# Patient Record
Sex: FEMALE | Employment: UNEMPLOYED | URBAN - METROPOLITAN AREA
[De-identification: names, ages, dates, MRNs, and addresses within clinical notes are randomized per-mention and may not be internally consistent; named-entity substitution may affect disease eponyms.]

---

## 2018-05-21 ENCOUNTER — HOSPITAL ENCOUNTER (OUTPATIENT)
Dept: LAB | Age: 68
Discharge: HOME | End: 2018-05-21
Payer: MEDICAID

## 2018-05-21 ENCOUNTER — OFFICE VISIT (OUTPATIENT)
Dept: FAMILY MEDICINE CLINIC | Age: 68
End: 2018-05-21

## 2018-05-21 VITALS
RESPIRATION RATE: 16 BRPM | TEMPERATURE: 98 F | HEART RATE: 74 BPM | WEIGHT: 160 LBS | BODY MASS INDEX: 28.35 KG/M2 | OXYGEN SATURATION: 98 % | HEIGHT: 63 IN | DIASTOLIC BLOOD PRESSURE: 70 MMHG | SYSTOLIC BLOOD PRESSURE: 119 MMHG

## 2018-05-21 DIAGNOSIS — E11.8 TYPE 2 DIABETES MELLITUS WITH COMPLICATION, WITHOUT LONG-TERM CURRENT USE OF INSULIN (HCC): ICD-10-CM

## 2018-05-21 DIAGNOSIS — M54.9 UPPER BACK PAIN: ICD-10-CM

## 2018-05-21 DIAGNOSIS — M54.2 CERVICAL PAIN: ICD-10-CM

## 2018-05-21 DIAGNOSIS — I10 ESSENTIAL HYPERTENSION: ICD-10-CM

## 2018-05-21 DIAGNOSIS — M50.30 DDD (DEGENERATIVE DISC DISEASE), CERVICAL: ICD-10-CM

## 2018-05-21 DIAGNOSIS — I10 ESSENTIAL HYPERTENSION: Primary | ICD-10-CM

## 2018-05-21 LAB
ALBUMIN SERPL-MCNC: 4 G/DL (ref 3.4–5)
ALBUMIN/GLOB SERPL: 1.2 {RATIO} (ref 0.8–1.7)
ALP SERPL-CCNC: 93 U/L (ref 45–117)
ALT SERPL-CCNC: 16 U/L (ref 13–56)
ANION GAP SERPL CALC-SCNC: 11 MMOL/L (ref 3–18)
AST SERPL-CCNC: 12 U/L (ref 15–37)
BASOPHILS # BLD: 0 K/UL (ref 0–0.06)
BASOPHILS NFR BLD: 0 % (ref 0–2)
BILIRUB SERPL-MCNC: 0.2 MG/DL (ref 0.2–1)
BUN SERPL-MCNC: 20 MG/DL (ref 7–18)
BUN/CREAT SERPL: 31 (ref 12–20)
CALCIUM SERPL-MCNC: 9.7 MG/DL (ref 8.5–10.1)
CHLORIDE SERPL-SCNC: 106 MMOL/L (ref 100–108)
CHOLEST SERPL-MCNC: 232 MG/DL
CO2 SERPL-SCNC: 26 MMOL/L (ref 21–32)
CREAT SERPL-MCNC: 0.65 MG/DL (ref 0.6–1.3)
DIFFERENTIAL METHOD BLD: ABNORMAL
EOSINOPHIL # BLD: 0.1 K/UL (ref 0–0.4)
EOSINOPHIL NFR BLD: 1 % (ref 0–5)
ERYTHROCYTE [DISTWIDTH] IN BLOOD BY AUTOMATED COUNT: 14.7 % (ref 11.6–14.5)
GLOBULIN SER CALC-MCNC: 3.3 G/DL (ref 2–4)
GLUCOSE SERPL-MCNC: 167 MG/DL (ref 74–99)
HBA1C MFR BLD: 9.2 % (ref 4.2–5.6)
HCT VFR BLD AUTO: 40.9 % (ref 35–45)
HDLC SERPL-MCNC: 62 MG/DL (ref 40–60)
HDLC SERPL: 3.7 {RATIO} (ref 0–5)
HGB BLD-MCNC: 13.2 G/DL (ref 12–16)
LDLC SERPL CALC-MCNC: 150.2 MG/DL (ref 0–100)
LIPID PROFILE,FLP: ABNORMAL
LYMPHOCYTES # BLD: 3.8 K/UL (ref 0.9–3.6)
LYMPHOCYTES NFR BLD: 47 % (ref 21–52)
MCH RBC QN AUTO: 27 PG (ref 24–34)
MCHC RBC AUTO-ENTMCNC: 32.3 G/DL (ref 31–37)
MCV RBC AUTO: 83.8 FL (ref 74–97)
MONOCYTES # BLD: 0.6 K/UL (ref 0.05–1.2)
MONOCYTES NFR BLD: 8 % (ref 3–10)
NEUTS SEG # BLD: 3.5 K/UL (ref 1.8–8)
NEUTS SEG NFR BLD: 44 % (ref 40–73)
PLATELET # BLD AUTO: 214 K/UL (ref 135–420)
PMV BLD AUTO: 12.3 FL (ref 9.2–11.8)
POTASSIUM SERPL-SCNC: 4.6 MMOL/L (ref 3.5–5.5)
PROT SERPL-MCNC: 7.3 G/DL (ref 6.4–8.2)
RBC # BLD AUTO: 4.88 M/UL (ref 4.2–5.3)
SODIUM SERPL-SCNC: 143 MMOL/L (ref 136–145)
TRIGL SERPL-MCNC: 99 MG/DL (ref ?–150)
VLDLC SERPL CALC-MCNC: 19.8 MG/DL
WBC # BLD AUTO: 8 K/UL (ref 4.6–13.2)

## 2018-05-21 PROCEDURE — 80053 COMPREHEN METABOLIC PANEL: CPT | Performed by: LEGAL MEDICINE

## 2018-05-21 PROCEDURE — 85025 COMPLETE CBC W/AUTO DIFF WBC: CPT | Performed by: LEGAL MEDICINE

## 2018-05-21 PROCEDURE — 83036 HEMOGLOBIN GLYCOSYLATED A1C: CPT | Performed by: LEGAL MEDICINE

## 2018-05-21 PROCEDURE — 80061 LIPID PANEL: CPT | Performed by: LEGAL MEDICINE

## 2018-05-21 PROCEDURE — 36415 COLL VENOUS BLD VENIPUNCTURE: CPT | Performed by: LEGAL MEDICINE

## 2018-05-21 RX ORDER — METFORMIN HYDROCHLORIDE 1000 MG/1
1000 TABLET ORAL 2 TIMES DAILY WITH MEALS
COMMUNITY
End: 2018-08-27 | Stop reason: SDUPTHER

## 2018-05-21 RX ORDER — CYCLOBENZAPRINE HCL 5 MG
5 TABLET ORAL
Qty: 30 TAB | Refills: 1 | Status: SHIPPED | OUTPATIENT
Start: 2018-05-21 | End: 2018-08-27 | Stop reason: SDUPTHER

## 2018-05-21 RX ORDER — GLUCOSAMINE SULFATE 1500 MG
1000 POWDER IN PACKET (EA) ORAL DAILY
COMMUNITY
End: 2019-07-06 | Stop reason: DRUGHIGH

## 2018-05-21 RX ORDER — LISINOPRIL AND HYDROCHLOROTHIAZIDE 12.5; 2 MG/1; MG/1
TABLET ORAL DAILY
COMMUNITY
End: 2018-11-06 | Stop reason: SDUPTHER

## 2018-05-21 RX ORDER — ASPIRIN 81 MG/1
81 TABLET ORAL DAILY
COMMUNITY

## 2018-05-21 RX ORDER — LISINOPRIL AND HYDROCHLOROTHIAZIDE 10; 12.5 MG/1; MG/1
1 TABLET ORAL DAILY
Qty: 90 TAB | Refills: 1 | Status: SHIPPED | OUTPATIENT
Start: 2018-05-21 | End: 2018-11-06 | Stop reason: DRUGHIGH

## 2018-05-21 NOTE — PROGRESS NOTES
Arline Pearce is a 77 y.o. female presents in office to establish care and for high bp. There are no preventive care reminders to display for this patient. 1. Have you been to the ER, urgent care clinic since your last visit? Hospitalized since your last visit?no    2. Have you seen or consulted any other health care providers outside of the [facility] since your last visit? Include any pap smears or colon screening.  no

## 2018-05-22 ENCOUNTER — TELEPHONE (OUTPATIENT)
Dept: FAMILY MEDICINE CLINIC | Age: 68
End: 2018-05-22

## 2018-05-22 RX ORDER — ATORVASTATIN CALCIUM 20 MG/1
20 TABLET, FILM COATED ORAL DAILY
Qty: 30 TAB | Refills: 2 | Status: SHIPPED | OUTPATIENT
Start: 2018-05-22 | End: 2018-08-27 | Stop reason: SDUPTHER

## 2018-05-22 NOTE — TELEPHONE ENCOUNTER
----- Message from Whitney Richter MD sent at 5/22/2018  1:13 PM EDT -----  Elevated hemoglobin A1c 9.2, continue metformin 1000 twice daily will repeat hemoglobin A1c in 2 month. Order will be placed in the file. High cholesterol level will start Lipitor 20 mg daily. Patient need to take aspirin 81 mg daily.   Over-the-counter    Kidney function is normal liver function function is normal, hemoglobin is normal.

## 2018-05-22 NOTE — PROGRESS NOTES
Elevated hemoglobin A1c 9.2, continue metformin 1000 twice daily will repeat hemoglobin A1c in 2 month. Order will be placed in the file. High cholesterol level will start Lipitor 20 mg daily. Patient need to take aspirin 81 mg daily.   Over-the-counter    Kidney function is normal liver function function is normal, hemoglobin is normal.

## 2018-05-22 NOTE — TELEPHONE ENCOUNTER
----- Message from Roseanna Witt MD sent at 5/22/2018  4:40 PM EDT -----  Degeneration of the spine it is called arthritis of the spine, referral to physical therapy was placed

## 2018-05-22 NOTE — PROGRESS NOTES
Masood Gan     Chief Complaint   Patient presents with    Hypertension    Establish Care    Diabetes     Vitals:    05/21/18 1604   BP: 119/70   Pulse: 74   Resp: 16   Temp: 98 °F (36.7 °C)   TempSrc: Oral   SpO2: 98%   Weight: 160 lb (72.6 kg)   Height: 5' 2.68\" (1.592 m)   PainSc:   0 - No pain         HPI: Patient here to establish care, and follow-up for hypertension which is well-controlled and lisinopril and hydrochlorothiazide, she has been diagnosed with hypertension many years ago. She also has type 2 diabetes she is supposed to be on metformin 1000 twice daily but she has been taking it once a day for the last 4 month!!!! And then she had noticed that her blood sugar has been elevated in the 300 and for the last week she has been taking it twice daily. She has neck and upper back pain for many years but she has been having worsening of the pain lately there is no numbness no weakness no radiation of the pain, she has not had recent imaging or not had any physical therapy recently, she take analgesics over-the-counter for pain. Past Medical History:   Diagnosis Date    Diabetes ([address]     Hypercholesteremia     Hypertension      Past Surgical History:   Procedure Laterality Date    HX CHOLECYSTECTOMY       Social History   Substance Use Topics    Smoking status: Never Smoker    Smokeless tobacco: Never Used    Alcohol use No       Family History   Problem Relation Age of Onset    Diabetes Mother     Hypertension Mother        Review of Systems   Constitutional: Negative for chills, fever, malaise/fatigue and weight loss. HENT: Negative for congestion, ear discharge, ear pain, hearing loss, nosebleeds, sinus pain and sore throat. Eyes: Negative for blurred vision, double vision and discharge. Respiratory: Negative for cough, hemoptysis, sputum production and shortness of breath. Cardiovascular: Negative for chest pain, palpitations, claudication and leg swelling. Gastrointestinal: Negative for abdominal pain, constipation, diarrhea, nausea and vomiting. Genitourinary: Negative for dysuria, frequency and urgency. Musculoskeletal: Positive for back pain and neck pain. Negative for joint pain and myalgias. Skin: Negative for itching and rash. Neurological: Negative for dizziness, tingling, sensory change, speech change, focal weakness, weakness and headaches. Psychiatric/Behavioral: Negative for depression, hallucinations, substance abuse and suicidal ideas. Physical Exam   Constitutional: She is oriented to person, place, and time. She appears well-developed and well-nourished. No distress. HENT:   Head: Normocephalic and atraumatic. Mouth/Throat: Oropharynx is clear and moist. No oropharyngeal exudate. Eyes: Conjunctivae are normal. Pupils are equal, round, and reactive to light. Right eye exhibits no discharge. Left eye exhibits no discharge. No scleral icterus. Neck: Normal range of motion. Neck supple. No thyromegaly present. Cardiovascular: Normal rate, regular rhythm and normal heart sounds. No murmur heard. Pulmonary/Chest: Effort normal and breath sounds normal. No respiratory distress. She has no wheezes. She has no rales. She exhibits no tenderness. Abdominal: Soft. Bowel sounds are normal. She exhibits no distension. There is no tenderness. There is no rebound and no guarding. Musculoskeletal: Normal range of motion. She exhibits no edema, tenderness or deformity. Lymphadenopathy:     She has no cervical adenopathy. Neurological: She is alert and oriented to person, place, and time. No cranial nerve deficit. Coordination normal.   Skin: Skin is warm and dry. No rash noted. She is not diaphoretic. No erythema. No pallor. Psychiatric: She has a normal mood and affect. Her behavior is normal. Judgment and thought content normal.        Assessment and plan     1.  Essential hypertension    Blood pressure is well controlled on current medication continue lisinopril with hydrochlorothiazide.    - CBC WITH AUTOMATED DIFF; Future  - METABOLIC PANEL, COMPREHENSIVE; Future  - LIPID PANEL; Future  - HEMOGLOBIN A1C W/O EAG; Future  - MICROALBUMIN, UR, RAND W/ MICROALB/CREAT RATIO; Future  - lisinopril-hydroCHLOROthiazide (PRINZIDE, ZESTORETIC) 10-12.5 mg per tablet; Take 1 Tab by mouth daily. Dispense: 90 Tab; Refill: 1    2. Cervical pain  Official x-ray result is pending, will consider physical therapy referral  - XR SPINE CERV PA LAT ODONT 3 V MAX; Future  - cyclobenzaprine (FLEXERIL) 5 mg tablet; Take 1 Tab by mouth nightly. Dispense: 30 Tab; Refill: 1    3. DDD (degenerative disc disease), cervical    - XR SPINE CERV PA LAT ODONT 3 V MAX; Future  - cyclobenzaprine (FLEXERIL) 5 mg tablet; Take 1 Tab by mouth nightly. Dispense: 30 Tab; Refill: 1    4. Type 2 diabetes mellitus with complication, without long-term current use of insulin (HCC)  ADA diet discussed with patient. We will check hemoglobin A1c. Currently she was only taking metformin once for the last 4 month and now she is being taken twice for the last few days  - CBC WITH AUTOMATED DIFF; Future  - METABOLIC PANEL, COMPREHENSIVE; Future  - LIPID PANEL; Future  - HEMOGLOBIN A1C W/O EAG; Future  - MICROALBUMIN, UR, RAND W/ MICROALB/CREAT RATIO; Future    5. Upper back pain      Current Outpatient Prescriptions   Medication Sig Dispense Refill    metFORMIN (GLUCOPHAGE) 1,000 mg tablet Take 1,000 mg by mouth two (2) times daily (with meals).  lisinopril-hydroCHLOROthiazide (PRINZIDE, ZESTORETIC) 20-12.5 mg per tablet Take  by mouth daily.  VITAMIN B COMPLEX PO Take 1 Tab by mouth daily.  cholecalciferol (VITAMIN D3) 1,000 unit cap Take 1,000 Units by mouth daily.  aspirin delayed-release 81 mg tablet Take 81 mg by mouth daily.  lisinopril-hydroCHLOROthiazide (PRINZIDE, ZESTORETIC) 10-12.5 mg per tablet Take 1 Tab by mouth daily.  90 Tab 1    cyclobenzaprine (FLEXERIL) 5 mg tablet Take 1 Tab by mouth nightly. 30 Tab 1       There are no active problems to display for this patient. No results found for this or any previous visit. Hospital Outpatient Visit on 05/21/2018   Component Date Value Ref Range Status    WBC 05/21/2018 8.0  4.6 - 13.2 K/uL Final    RBC 05/21/2018 4.88  4.20 - 5.30 M/uL Final    HGB 05/21/2018 13.2  12.0 - 16.0 g/dL Final    HCT 05/21/2018 40.9  35.0 - 45.0 % Final    MCV 05/21/2018 83.8  74.0 - 97.0 FL Final    MCH 05/21/2018 27.0  24.0 - 34.0 PG Final    MCHC 05/21/2018 32.3  31.0 - 37.0 g/dL Final    RDW 05/21/2018 14.7* 11.6 - 14.5 % Final    PLATELET 46/05/6054 717  135 - 420 K/uL Final    MPV 05/21/2018 12.3* 9.2 - 11.8 FL Final    NEUTROPHILS 05/21/2018 44  40 - 73 % Final    LYMPHOCYTES 05/21/2018 47  21 - 52 % Final    MONOCYTES 05/21/2018 8  3 - 10 % Final    EOSINOPHILS 05/21/2018 1  0 - 5 % Final    BASOPHILS 05/21/2018 0  0 - 2 % Final    ABS. NEUTROPHILS 05/21/2018 3.5  1.8 - 8.0 K/UL Final    ABS. LYMPHOCYTES 05/21/2018 3.8* 0.9 - 3.6 K/UL Final    ABS. MONOCYTES 05/21/2018 0.6  0.05 - 1.2 K/UL Final    ABS. EOSINOPHILS 05/21/2018 0.1  0.0 - 0.4 K/UL Final    ABS.  BASOPHILS 05/21/2018 0.0  0.0 - 0.06 K/UL Final    DF 05/21/2018 AUTOMATED    Final    Sodium 05/21/2018 143  136 - 145 mmol/L Final    Potassium 05/21/2018 4.6  3.5 - 5.5 mmol/L Final    Chloride 05/21/2018 106  100 - 108 mmol/L Final    CO2 05/21/2018 26  21 - 32 mmol/L Final    Anion gap 05/21/2018 11  3.0 - 18 mmol/L Final    Glucose 05/21/2018 167* 74 - 99 mg/dL Final    BUN 05/21/2018 20* 7.0 - 18 MG/DL Final    Creatinine 05/21/2018 0.65  0.6 - 1.3 MG/DL Final    BUN/Creatinine ratio 05/21/2018 31* 12 - 20   Final    GFR est AA 05/21/2018 >60  >60 ml/min/1.73m2 Final    GFR est non-AA 05/21/2018 >60  >60 ml/min/1.73m2 Final    Comment: (NOTE)  Estimated GFR is calculated using the Modification of Diet in Renal Disease (MDRD) Study equation, reported for both  Americans   (GFRAA) and non- Americans (GFRNA), and normalized to 1.73m2   body surface area. The physician must decide which value applies to   the patient. The MDRD study equation should only be used in   individuals age 25 or older. It has not been validated for the   following: pregnant women, patients with serious comorbid conditions,   or on certain medications, or persons with extremes of body size,   muscle mass, or nutritional status.  Calcium 05/21/2018 9.7  8.5 - 10.1 MG/DL Final    Bilirubin, total 05/21/2018 0.2  0.2 - 1.0 MG/DL Final    ALT (SGPT) 05/21/2018 16  13 - 56 U/L Final    AST (SGOT) 05/21/2018 12* 15 - 37 U/L Final    Alk. phosphatase 05/21/2018 93  45 - 117 U/L Final    Protein, total 05/21/2018 7.3  6.4 - 8.2 g/dL Final    Albumin 05/21/2018 4.0  3.4 - 5.0 g/dL Final    Globulin 05/21/2018 3.3  2.0 - 4.0 g/dL Final    A-G Ratio 05/21/2018 1.2  0.8 - 1.7   Final    LIPID PROFILE 05/21/2018        Final    Cholesterol, total 05/21/2018 232* <200 MG/DL Final    Triglyceride 05/21/2018 99  <150 MG/DL Final    Comment: The drugs N-acetylcysteine (NAC) and  Metamiszole have been found to cause falsely  low results in this chemical assay. Please  be sure to submit blood samples obtained  BEFORE administration of either of these  drugs to assure correct results.  HDL Cholesterol 05/21/2018 62* 40 - 60 MG/DL Final    LDL, calculated 05/21/2018 150.2* 0 - 100 MG/DL Final    VLDL, calculated 05/21/2018 19.8  MG/DL Final    CHOL/HDL Ratio 05/21/2018 3.7  0 - 5.0   Final    Hemoglobin A1c 05/21/2018 9.2* 4.2 - 5.6 % Final    Comment: (NOTE)  HbA1C Interpretive Ranges  <5.7              Normal  5.7 - 6.4         Consider Prediabetes  >6.5              Consider Diabetes            Follow-up Disposition:  Return in about 3 months (around 8/21/2018) for and as per results .

## 2018-05-22 NOTE — TELEPHONE ENCOUNTER
Spoke to pt and made aware of the message, verbalized understanding. Please send the [facility] to [facility].  Thank you  Ronal Porter LPN

## 2018-06-06 ENCOUNTER — DOCUMENTATION ONLY (OUTPATIENT)
Dept: FAMILY MEDICINE CLINIC | Age: 68
End: 2018-06-06

## 2018-06-06 NOTE — PROGRESS NOTES
Medical Records received from office of Maverick Liz. Forwarded to Dr. Adriel Gunn for review and then scanning.

## 2018-06-18 ENCOUNTER — HOSPITAL ENCOUNTER (OUTPATIENT)
Dept: PHYSICAL THERAPY | Age: 68
Discharge: HOME | End: 2018-06-18
Payer: MEDICAID

## 2018-06-18 PROCEDURE — 97140 MANUAL THERAPY 1/> REGIONS: CPT

## 2018-06-18 PROCEDURE — 97162 PT EVAL MOD COMPLEX 30 MIN: CPT

## 2018-06-18 NOTE — PROGRESS NOTES
PHYSICAL THERAPY - DAILY TREATMENT NOTE    Patient Name: Sony Belcher        Date: 2018  : 1950   yes Patient  Verified  Visit #:      of   8  Insurance: Payor: Hank [id number] / Plan: [employer] [facility] / Product Type: Medicaid /      In time: 4:30 Out time: 5:02   Total Treatment Time: 32     Medicare/BCBS Time Tracking (below)   Total Timed Codes (min):  na 1:1 Treatment Time:  na     TREATMENT AREA =  Neck pain [M54.2]    SUBJECTIVE  Pain Level (on 0 to 10 scale):  7  / 10   Medication Changes/New allergies or changes in medical history, any new surgeries or procedures?    no  If yes, update Summary List   Subjective Functional Status/Changes:  []  No changes reported     See POC          OBJECTIVE    NC min Therapeutic Exercise:  [x]  See flow sheet   Rationale:      increase ROM to improve the patients ability to complete ADLs     10 min Manual Therapy: STM to B c/s samia, UT, SOR   Rationale:      decrease pain, increase ROM, increase tissue extensibility and decrease trigger points to improve patient's ability to complete ADLs       min Patient Education:  yes  Reviewed HEP   []  Progressed/Changed HEP based on: Other Objective/Functional Measures:    Pt demo I w/ HEP  Reported dec pain post tx session  Reviewed sitting posture     Post Treatment Pain Level (on 0 to 10) scale:   5  / 10     ASSESSMENT  Assessment/Changes in Function:     See POC     []  See Progress Note/Recertification   Patient will continue to benefit from skilled PT services to modify and progress therapeutic interventions, address functional mobility deficits, address ROM deficits, address strength deficits, analyze and address soft tissue restrictions, analyze and cue movement patterns, analyze and modify body mechanics/ergonomics, assess and modify postural abnormalities and instruct in home and community integration to attain remaining goals.    Progress toward goals / Updated goals:    See POC     PLAN  [] Upgrade activities as tolerated yes Continue plan of care   []  Discharge due to :    []  Other:      Therapist: Temi Cerrato PT    Date: 6/18/2018 Time: 5:02 PM     No future appointments.

## 2018-06-18 NOTE — PROGRESS NOTES
[address] [place] [address], [address] [facility] - Phone: [phone]  Fax: [phone] / [address] [facility]  Patient Name: Michelle Ferreira : 1950   Medical   Diagnosis: C/s DDD [M50.30] Treatment Diagnosis: Neck pain [M54.2]   Onset Date: chronic     Referral Source: Ngoc Pritchett MD Start of Care [facility]): 2018   Prior Hospitalization: See medical history Provider #: [id number]   Prior Level of Function: Hx intermittent c/s pain, RHD   Comorbidities: DM, HTN, OA   Medications: Verified on Patient Summary List   The Plan of Care and following information is based on the information from the initial evaluation.   ===========================================================================================  Assessment / key information:  Pt is a 76 y/o F who presents to PT w/ c/o c/s pain that began approx 6 months ago and has progressively worsened. Pt here today with her daughter who provided  services when needed. 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 Pain ranges from 5-10/10 and is located B [place] and along B c/s into upper t/s. Sx are made worse when looking down; better laying down and with heating pad. Pt denies N/T. Reports x-rays showed DDD. Postural evaluation shows inc FHP w/ slouched sitting posture, IR GHJ, and R elevated UT. C/s AROM limited and painful in flex, L SB, and RR. T/s rot dec 90% B. B shoulder AROM WNL, pain at end-range flex and FIR B. Pt demo gross weakness to B periscapular and shoulder muscles. Sensation symmetrical and intact to light touch B UE dermatomes. (-)c/s compression test but does gain relief w/ c/s distraction test. TTP to R>L c/s samia, UT, lev scap, suboccipital triangle. Dec upper c/s flex and rot noted as well as dec t/s PA mobility.  FOTO score 17/100. Pt educated on dx, prognosis, POC, HEP, and posture. Pt may benefit from PT to address impairments and functional limitations in order to improve pt's activity tolerance.   ===========================================================================================  Eval Complexity: History LOW Complexity : Zero comorbidities / personal factors that will impact the outcome / POC;  Examination  MEDIUM Complexity : 3 Standardized tests and measures addressing body structure, function, activity limitation and / or participation in recreation ; Presentation MEDIUM Complexity : Evolving with changing characteristics ; Decision Making HIGH Complexity : FOTO score of 1- 25 ; Overall Complexity LOW   Problem List: pain affecting function, decrease ROM, decrease strength, decrease ADL/ functional abilitiies, decrease activity tolerance and decrease flexibility/ joint mobility   Treatment Plan may include any combination of the following: Therapeutic exercise, Therapeutic activities, Neuromuscular re-education, Physical agent/modality, Manual therapy, Patient education, Self Care training, Functional mobility training and Home safety training  Patient / Family readiness to learn indicated by: asking questions, trying to perform skills and interest  Persons(s) to be included in education: patient (P)  Barriers to Learning/Limitations: None  Measures taken:    Patient Goal (s): \"relief from the pain\"   Patient self reported health status: fair  Rehabilitation Potential: good   Short Term Goals: To be accomplished in  2  weeks:  1. Pt will be I and compliant with HEP for self management of sx   Long Term Goals: To be accomplished in  4-6  weeks:  1. Pt will report at least 75% overall improvement in sx in order to improve activity tolerance  2. Improve FOTO score to >/= 45/100 to indicate improved function  3.  Pt will demo I w/ long-term HEP for self-management of sx upon DC    Frequency / Duration:   Patient to be seen  1-2  times per week for 4-6  weeks:  Patient / Caregiver education and instruction: exercises, posture  G-Codes (GP): na  Therapist Signature: [facility],  Date: 4/30/4528   Certification Period: na Time: 5:05 PM   ===========================================================================================  I certify that the above Physical Therapy Services are being furnished while the patient is under my care. I agree with the treatment plan and certify that this therapy is necessary. Physician Signature:        Date:       Time:     Please sign and return to [facility], [facility]. or you may fax the signed copy to [phone]. Thank you.

## 2018-08-27 ENCOUNTER — OFFICE VISIT (OUTPATIENT)
Dept: FAMILY MEDICINE CLINIC | Age: 68
End: 2018-08-27

## 2018-08-27 VITALS
OXYGEN SATURATION: 98 % | HEART RATE: 77 BPM | WEIGHT: 161 LBS | TEMPERATURE: 95.8 F | DIASTOLIC BLOOD PRESSURE: 69 MMHG | SYSTOLIC BLOOD PRESSURE: 140 MMHG | HEIGHT: 61 IN | BODY MASS INDEX: 30.4 KG/M2 | RESPIRATION RATE: 16 BRPM

## 2018-08-27 DIAGNOSIS — K58.2 IRRITABLE BOWEL SYNDROME WITH BOTH CONSTIPATION AND DIARRHEA: ICD-10-CM

## 2018-08-27 DIAGNOSIS — M50.30 DDD (DEGENERATIVE DISC DISEASE), CERVICAL: ICD-10-CM

## 2018-08-27 DIAGNOSIS — M17.0 OSTEOARTHRITIS OF BOTH KNEES, UNSPECIFIED OSTEOARTHRITIS TYPE: ICD-10-CM

## 2018-08-27 DIAGNOSIS — E11.8 TYPE 2 DIABETES MELLITUS WITH COMPLICATION, WITHOUT LONG-TERM CURRENT USE OF INSULIN (HCC): ICD-10-CM

## 2018-08-27 DIAGNOSIS — E78.2 MIXED HYPERLIPIDEMIA: ICD-10-CM

## 2018-08-27 DIAGNOSIS — I10 ESSENTIAL HYPERTENSION: ICD-10-CM

## 2018-08-27 DIAGNOSIS — M81.0 OSTEOPOROSIS, UNSPECIFIED OSTEOPOROSIS TYPE, UNSPECIFIED PATHOLOGICAL FRACTURE PRESENCE: ICD-10-CM

## 2018-08-27 DIAGNOSIS — Z01.818 PRE-OP EXAM: Primary | ICD-10-CM

## 2018-08-27 DIAGNOSIS — M54.2 CERVICAL PAIN: ICD-10-CM

## 2018-08-27 LAB
GLUCOSE DOSE-GTT, POCT, GLDSPOCT: 247
HBA1C MFR BLD HPLC: 6.6 %

## 2018-08-27 RX ORDER — ACETAMINOPHEN AND CODEINE PHOSPHATE 300; 30 MG/1; MG/1
1 TABLET ORAL 2 TIMES DAILY
Qty: 30 TAB | Refills: 0 | Status: SHIPPED | OUTPATIENT
Start: 2018-08-27 | End: 2018-11-06 | Stop reason: SDUPTHER

## 2018-08-27 RX ORDER — ATORVASTATIN CALCIUM 20 MG/1
20 TABLET, FILM COATED ORAL DAILY
Qty: 90 TAB | Refills: 1 | Status: SHIPPED | OUTPATIENT
Start: 2018-08-27 | End: 2018-11-06 | Stop reason: SDUPTHER

## 2018-08-27 RX ORDER — METFORMIN HYDROCHLORIDE 1000 MG/1
1000 TABLET ORAL 2 TIMES DAILY WITH MEALS
Qty: 180 TAB | Refills: 1 | Status: SHIPPED | OUTPATIENT
Start: 2018-08-27 | End: 2018-11-06 | Stop reason: SDUPTHER

## 2018-08-27 RX ORDER — CYCLOBENZAPRINE HCL 5 MG
5 TABLET ORAL
Qty: 30 TAB | Refills: 1 | Status: SHIPPED | OUTPATIENT
Start: 2018-08-27 | End: 2018-11-06 | Stop reason: SDUPTHER

## 2018-08-27 NOTE — PROGRESS NOTES
Kadie Pruett is a 77 y.o. female presents in office to be seen for pre-op exam for cataract surgery tomorrow. Health Maintenance Due   Topic Date Due    Hepatitis C Screening  1950    DTaP/Tdap/Td series (1 - Tdap) 01/01/1969    BREAST CANCER SCRN MAMMOGRAM  01/01/1998    FOBT Q 1 YEAR AGE 50-75  01/01/1998    ZOSTER VACCINE AGE 60>  11/01/2007    GLAUCOMA SCREENING Q2Y  01/01/2013    Bone Densitometry (Dexa) Screening  01/01/2013    Pneumococcal 65+ Low/Medium Risk (1 of 2 - PCV13) 01/01/2013    Influenza Age 9 to Adult  08/01/2018       1. Have you been to the ER, urgent care clinic since your last visit? Hospitalized since your last visit?no    2. Have you seen or consulted any other health care providers outside of the [address] [place] [address] since your last visit? Include any pap smears or colon screening.  no

## 2018-08-27 NOTE — PROGRESS NOTES
Sylvia Bo     Chief Complaint   Patient presents with    Neck Pain    Pre-op Exam     Vitals:    08/27/18 1144   BP: 140/69   Pulse: 77   Resp: 16   Temp: 95.8 °F (35.4 °C)   TempSrc: Oral   SpO2: 98%   Weight: 161 lb (73 kg)   Height: 5' 1.5\" (1.562 m)   PainSc:   7   PainLoc: Neck         HPI: Patient is here with her daughter for preop for ophthalmology surgery, she is having cataract surgery tomorrow at the [facility]. Patient also following up on hypertension, blood pressure reading today systolic slightly elevated. Patient have a chronic neck pain due to degenerative disc disease, she still having pain it ranges from 5-8, it increased by movement and bending down, she has been attending physical therapy was minimal help. Patient has type 2 diabetes last hemoglobin A1c was 9.1 about 3 months ago, her metformin was increased from from 1000 to 2000 daily, today her hemoglobin A1c is 6.1. She has a history of osteoporosis but she never had a DEXA scan done will order one today. Patient refused to have a mammogram she understands he can sequence, will discuss at next visit again. Patient also very reluctant to get any stool testing or colonoscopy at this time. Past Medical History:   Diagnosis Date    Diabetes ([address]     Hypercholesteremia     Hypertension      Past Surgical History:   Procedure Laterality Date    HX CHOLECYSTECTOMY       Social History   Substance Use Topics    Smoking status: Never Smoker    Smokeless tobacco: Never Used    Alcohol use No       Family History   Problem Relation Age of Onset    Diabetes Mother     Hypertension Mother        Review of Systems   Constitutional: Negative for chills, fever, malaise/fatigue and weight loss. HENT: Negative for congestion, ear discharge, ear pain, hearing loss, nosebleeds, sinus pain and sore throat. Eyes: Negative for blurred vision, double vision and discharge.    Respiratory: Negative for cough, hemoptysis, sputum production and shortness of breath. Cardiovascular: Negative for chest pain, palpitations, orthopnea, claudication and leg swelling. Gastrointestinal: Positive for abdominal pain and constipation. Negative for blood in stool, diarrhea, melena, nausea and vomiting. Genitourinary: Negative for dysuria, frequency, hematuria and urgency. Musculoskeletal: Positive for back pain, joint pain and neck pain. Negative for myalgias. Skin: Negative for itching and rash. Neurological: Negative for dizziness, tingling, sensory change, speech change, focal weakness, seizures, weakness and headaches. Psychiatric/Behavioral: Negative for depression, hallucinations, substance abuse and suicidal ideas. The patient is not nervous/anxious and does not have insomnia. Physical Exam   Constitutional: She is oriented to person, place, and time. She appears well-developed and well-nourished. No distress. HENT:   Head: Normocephalic and atraumatic. Mouth/Throat: Oropharynx is clear and moist. No oropharyngeal exudate. Eyes: Conjunctivae are normal. Pupils are equal, round, and reactive to light. Right eye exhibits no discharge. Left eye exhibits no discharge. No scleral icterus. Neck: Normal range of motion. Neck supple. No thyromegaly present. Cardiovascular: Normal rate, regular rhythm and normal heart sounds. No murmur heard. Pulmonary/Chest: Effort normal and breath sounds normal. No respiratory distress. She has no wheezes. She has no rales. She exhibits no tenderness. Abdominal: Soft. Bowel sounds are normal. She exhibits no distension. There is no tenderness. There is no rebound and no guarding. Musculoskeletal: Normal range of motion. She exhibits tenderness. She exhibits no edema or deformity. Neck and upper back tenderness   Lymphadenopathy:     She has no cervical adenopathy. Neurological: She is alert and oriented to person, place, and time. No cranial nerve deficit. Coordination normal.   Skin: Skin is warm and dry. No rash noted. She is not diaphoretic. No erythema. No pallor. Psychiatric: She has a normal mood and affect. Her behavior is normal. Judgment and thought content normal.        Assessment and plan     1. Cervical pain    - cyclobenzaprine (FLEXERIL) 5 mg tablet; Take 1 Tab by mouth nightly. Dispense: 30 Tab; Refill: 1  - acetaminophen-codeine (TYLENOL #3) 300-30 mg per tablet; Take 1 Tab by mouth two (2) times a day. Max Daily Amount: 2 Tabs. Dispense: 30 Tab; Refill: 0    2. DDD (degenerative disc disease), cervical  Patient is having daily pain she will be referred to pain management for nonnarcotic management. She is still doing physical therapy    - cyclobenzaprine (FLEXERIL) 5 mg tablet; Take 1 Tab by mouth nightly. Dispense: 30 Tab; Refill: 1  - REFERRAL TO PAIN MANAGEMENT  - acetaminophen-codeine (TYLENOL #3) 300-30 mg per tablet; Take 1 Tab by mouth two (2) times a day. Max Daily Amount: 2 Tabs. Dispense: 30 Tab; Refill: 0    3. Essential hypertension  Well-controlled    4. Mixed hyperlipidemia  Patient is on statin  - atorvastatin (LIPITOR) 20 mg tablet; Take 1 Tab by mouth daily. Dispense: 90 Tab; Refill: 1    5. Type 2 diabetes mellitus with complication, without long-term current use of insulin ([facility])  Hemoglobin A1c is 6.1 is much improvement from 3 months ago was 9 point  - metFORMIN (GLUCOPHAGE) 1,000 mg tablet; Take 1 Tab by mouth two (2) times daily (with meals). Dispense: 180 Tab; Refill: 1  - AMB POC GLUCOSE TEST  - AMB POC HEMOGLOBIN A1C    6. Pre-op exam  Patient is clear for eye surgery  - AMB POC EKG ROUTINE W/ 12 LEADS, INTER & REP    7. Osteoporosis, unspecified osteoporosis type, unspecified pathological fracture presence  History of osteoporosis will get a bone density scan for evaluation  - DEXA BONE DENSITY STUDY AXIAL; Future    8.  Irritable bowel syndrome with both constipation and diarrhea  Patient advised about high fiber diet increase water intake, daily walk, and keep a note of report that  aggravates her symptoms. I started her on linzess daily     Current Outpatient Prescriptions   Medication Sig Dispense Refill    atorvastatin (LIPITOR) 20 mg tablet Take 1 Tab by mouth daily. 90 Tab 1    metFORMIN (GLUCOPHAGE) 1,000 mg tablet Take 1 Tab by mouth two (2) times daily (with meals). 180 Tab 1    cyclobenzaprine (FLEXERIL) 5 mg tablet Take 1 Tab by mouth nightly. 30 Tab 1    acetaminophen-codeine (TYLENOL #3) 300-30 mg per tablet Take 1 Tab by mouth two (2) times a day. Max Daily Amount: 2 Tabs. 30 Tab 0    linaclotide (LINZESS) 145 mcg cap capsule Take 1 Cap by mouth Daily (before breakfast). 30 Cap 2    lisinopril-hydroCHLOROthiazide (PRINZIDE, ZESTORETIC) 20-12.5 mg per tablet Take  by mouth daily.  cholecalciferol (VITAMIN D3) 1,000 unit cap Take 1,000 Units by mouth daily.  aspirin delayed-release 81 mg tablet Take 81 mg by mouth daily.  VITAMIN B COMPLEX PO Take 1 Tab by mouth daily.  lisinopril-hydroCHLOROthiazide (PRINZIDE, ZESTORETIC) 10-12.5 mg per tablet Take 1 Tab by mouth daily. 90 Tab 1       Patient Active Problem List    Diagnosis Date Noted    DDD (degenerative disc disease), cervical 08/27/2018    Essential hypertension 08/27/2018    Mixed hyperlipidemia 08/27/2018    Osteoporosis 08/27/2018     Results for orders placed or performed in visit on 08/27/18   AMB POC GLUCOSE TEST   Result Value Ref Range    Glucose dose-    AMB POC HEMOGLOBIN A1C   Result Value Ref Range    Hemoglobin A1c (POC) 6.6 %     Office Visit on 08/27/2018   Component Date Value Ref Range Status    Glucose dose-GTT 08/27/2018 247   Final    Hemoglobin A1c (POC) 08/27/2018 6.6  % Final          Follow-up Disposition:  Return in about 2 months (around 10/27/2018).

## 2018-09-12 ENCOUNTER — TELEPHONE (OUTPATIENT)
Dept: FAMILY MEDICINE CLINIC | Age: 68
End: 2018-09-12

## 2018-09-14 ENCOUNTER — TELEPHONE (OUTPATIENT)
Dept: FAMILY MEDICINE CLINIC | Age: 68
End: 2018-09-14

## 2018-09-24 NOTE — PROGRESS NOTES
[address] PHYSICAL THERAPY  [address] [place] [address] 201,[facility], [facility] - Phone: [phone]  Fax: [phone]    DISCHARGE NOTE  Patient Name: Nayely Giron : 1950   Treatment/Medical Diagnosis: Neck pain [M54.2]   Referral Source: Walter Shaver MD     Date of Initial Visit: 18 Attended Visits: 1 Missed Visits: 0       SUMMARY OF TREATMENT  Pt attended initial evaluation only. Unfortunately, patient failed to return for further treatment and is therefore discharged from our care at this time. A formal reassessment of goals was unable to be performed due to unplanned DC. RECOMMENDATIONS  Discontinue physical therapy due to patient not returning. If you have any questions/comments please contact us directly at [phone]. Thank you for allowing us to assist in the care of your patient. Therapist Signature: Kacey Jamison, PT, DPT, CMTPT, SFMA Date: 18     Time: 12:10 PM     NOTE TO PHYSICIAN:  Your patient's insurance requires this discharge note be signed and returned. PLEASE COMPLETE THE ORDERS BELOW AND RETURN TO:  [facility] PHYSICAL THERAPY    ___ I have read the above report and request that my patient be discharged from therapy.      Physician Signature:        Date:       Time:

## 2018-10-30 ENCOUNTER — DOCUMENTATION ONLY (OUTPATIENT)
Dept: FAMILY MEDICINE CLINIC | Age: 68
End: 2018-10-30

## 2018-10-30 NOTE — PROGRESS NOTES
Patient has an appointment scheduled on 12/18/18 with Dr. Tonio Car for her initial consult re: neck pain/CHAPIN[place]O. She is also on the wait list for any openings that might arise prior to that date. Informed Dr. Zain Springer about the appointment/scheduling- any further action for earlier appointments will need to be requested through their office by the patient- due to her scheduling needs.

## 2018-11-06 DIAGNOSIS — M54.2 CERVICAL PAIN: ICD-10-CM

## 2018-11-06 DIAGNOSIS — E11.8 TYPE 2 DIABETES MELLITUS WITH COMPLICATION, WITHOUT LONG-TERM CURRENT USE OF INSULIN (HCC): ICD-10-CM

## 2018-11-06 DIAGNOSIS — I10 ESSENTIAL HYPERTENSION: ICD-10-CM

## 2018-11-06 DIAGNOSIS — I10 ESSENTIAL HYPERTENSION: Primary | ICD-10-CM

## 2018-11-06 DIAGNOSIS — M50.30 DDD (DEGENERATIVE DISC DISEASE), CERVICAL: ICD-10-CM

## 2018-11-06 DIAGNOSIS — E78.2 MIXED HYPERLIPIDEMIA: ICD-10-CM

## 2018-11-06 DIAGNOSIS — K58.2 IRRITABLE BOWEL SYNDROME WITH BOTH CONSTIPATION AND DIARRHEA: ICD-10-CM

## 2018-11-06 RX ORDER — LISINOPRIL AND HYDROCHLOROTHIAZIDE 12.5; 2 MG/1; MG/1
1 TABLET ORAL DAILY
Qty: 180 TAB | Refills: 0 | Status: SHIPPED | OUTPATIENT
Start: 2018-11-06 | End: 2019-05-26 | Stop reason: SDUPTHER

## 2018-11-06 RX ORDER — ACETAMINOPHEN AND CODEINE PHOSPHATE 300; 30 MG/1; MG/1
1 TABLET ORAL 2 TIMES DAILY
Qty: 30 TAB | Refills: 0 | Status: SHIPPED | OUTPATIENT
Start: 2018-11-06 | End: 2019-07-06

## 2018-11-06 RX ORDER — METFORMIN HYDROCHLORIDE 1000 MG/1
1000 TABLET ORAL 2 TIMES DAILY WITH MEALS
Qty: 240 TAB | Refills: 0 | Status: CANCELLED | OUTPATIENT
Start: 2018-11-06

## 2018-11-06 RX ORDER — CYCLOBENZAPRINE HCL 5 MG
5 TABLET ORAL
Qty: 30 TAB | Refills: 1 | Status: SHIPPED | OUTPATIENT
Start: 2018-11-06 | End: 2019-07-06

## 2018-11-06 RX ORDER — ATORVASTATIN CALCIUM 20 MG/1
20 TABLET, FILM COATED ORAL DAILY
Qty: 120 TAB | Refills: 0 | Status: CANCELLED | OUTPATIENT
Start: 2018-11-06

## 2018-11-06 RX ORDER — CYCLOBENZAPRINE HCL 5 MG
5 TABLET ORAL
Qty: 120 TAB | Refills: 0 | Status: CANCELLED | OUTPATIENT
Start: 2018-11-06

## 2018-11-06 RX ORDER — ATORVASTATIN CALCIUM 20 MG/1
20 TABLET, FILM COATED ORAL DAILY
Qty: 180 TAB | Refills: 0 | Status: SHIPPED | OUTPATIENT
Start: 2018-11-06 | End: 2019-07-06

## 2018-11-06 RX ORDER — METFORMIN HYDROCHLORIDE 1000 MG/1
1000 TABLET ORAL 2 TIMES DAILY WITH MEALS
Qty: 360 TAB | Refills: 1 | Status: SHIPPED | OUTPATIENT
Start: 2018-11-06 | End: 2019-07-06 | Stop reason: DRUGHIGH

## 2018-11-06 NOTE — TELEPHONE ENCOUNTER
Please sign order. Requested Prescriptions     Pending Prescriptions Disp Refills    aspirin delayed-release 81 mg tablet 120 Tab 0     Sig: Take 1 Tab by mouth daily.  atorvastatin (LIPITOR) 20 mg tablet 120 Tab 0     Sig: Take 1 Tab by mouth daily.  cholecalciferol (VITAMIN D3) 1,000 unit cap 120 Cap 0     Sig: Take 1 Cap by mouth daily.  cyclobenzaprine (FLEXERIL) 5 mg tablet 120 Tab 0     Sig: Take 1 Tab by mouth nightly.  linaclotide (LINZESS) 145 mcg cap capsule 120 Cap 0     Sig: Take 1 Cap by mouth Daily (before breakfast).  metFORMIN (GLUCOPHAGE) 1,000 mg tablet 240 Tab 0     Sig: Take 1 Tab by mouth two (2) times daily (with meals).  lisinopril-hydroCHLOROthiazide (PRINZIDE, ZESTORETIC) 10-12.5 mg per tablet 120 Tab 0     Sig: Take 1 Tab by mouth daily.

## 2018-11-07 RX ORDER — ASPIRIN 81 MG/1
81 TABLET ORAL DAILY
Qty: 120 TAB | Refills: 0 | Status: CANCELLED | OUTPATIENT
Start: 2018-11-07

## 2018-11-07 RX ORDER — LISINOPRIL AND HYDROCHLOROTHIAZIDE 10; 12.5 MG/1; MG/1
1 TABLET ORAL DAILY
Qty: 120 TAB | Refills: 0 | Status: CANCELLED | OUTPATIENT
Start: 2018-11-07

## 2018-11-07 RX ORDER — GLUCOSAMINE SULFATE 1500 MG
1000 POWDER IN PACKET (EA) ORAL DAILY
Qty: 120 CAP | Refills: 0 | Status: CANCELLED | OUTPATIENT
Start: 2018-11-07

## 2018-11-15 DIAGNOSIS — L81.1 MELASMA: Primary | ICD-10-CM

## 2019-05-26 DIAGNOSIS — I10 ESSENTIAL HYPERTENSION: ICD-10-CM

## 2019-05-27 RX ORDER — LISINOPRIL AND HYDROCHLOROTHIAZIDE 12.5; 2 MG/1; MG/1
TABLET ORAL
Qty: 90 TAB | Refills: 0 | Status: SHIPPED | OUTPATIENT
Start: 2019-05-27 | End: 2019-07-06 | Stop reason: SDUPTHER

## 2019-07-02 DIAGNOSIS — E11.8 CONTROLLED TYPE 2 DIABETES MELLITUS WITH COMPLICATION, WITHOUT LONG-TERM CURRENT USE OF INSULIN (HCC): ICD-10-CM

## 2019-07-02 DIAGNOSIS — E55.9 VITAMIN D DEFICIENCY: ICD-10-CM

## 2019-07-02 DIAGNOSIS — E78.2 MIXED HYPERLIPIDEMIA: ICD-10-CM

## 2019-07-02 DIAGNOSIS — M81.0 OSTEOPOROSIS, UNSPECIFIED OSTEOPOROSIS TYPE, UNSPECIFIED PATHOLOGICAL FRACTURE PRESENCE: ICD-10-CM

## 2019-07-02 DIAGNOSIS — I10 ESSENTIAL HYPERTENSION: Primary | ICD-10-CM

## 2019-07-02 DIAGNOSIS — Z11.59 NEED FOR HEPATITIS C SCREENING TEST: ICD-10-CM

## 2019-07-03 ENCOUNTER — HOSPITAL ENCOUNTER (OUTPATIENT)
Dept: LAB | Age: 69
Discharge: HOME | End: 2019-07-03
Payer: MEDICAID

## 2019-07-03 DIAGNOSIS — I10 ESSENTIAL HYPERTENSION: ICD-10-CM

## 2019-07-03 DIAGNOSIS — E11.8 CONTROLLED TYPE 2 DIABETES MELLITUS WITH COMPLICATION, WITHOUT LONG-TERM CURRENT USE OF INSULIN (HCC): ICD-10-CM

## 2019-07-03 DIAGNOSIS — E78.2 MIXED HYPERLIPIDEMIA: ICD-10-CM

## 2019-07-03 DIAGNOSIS — M81.0 OSTEOPOROSIS, UNSPECIFIED OSTEOPOROSIS TYPE, UNSPECIFIED PATHOLOGICAL FRACTURE PRESENCE: ICD-10-CM

## 2019-07-03 DIAGNOSIS — E55.9 VITAMIN D DEFICIENCY: ICD-10-CM

## 2019-07-03 DIAGNOSIS — Z11.59 NEED FOR HEPATITIS C SCREENING TEST: ICD-10-CM

## 2019-07-03 LAB
25(OH)D3 SERPL-MCNC: 27.5 NG/ML (ref 30–100)
ALBUMIN SERPL-MCNC: 3.7 G/DL (ref 3.4–5)
ALBUMIN/GLOB SERPL: 1.2 {RATIO} (ref 0.8–1.7)
ALP SERPL-CCNC: 78 U/L (ref 45–117)
ALT SERPL-CCNC: 17 U/L (ref 13–56)
ANION GAP SERPL CALC-SCNC: 7 MMOL/L (ref 3–18)
AST SERPL-CCNC: 17 U/L (ref 15–37)
BASOPHILS # BLD: 0 K/UL (ref 0–0.1)
BASOPHILS NFR BLD: 0 % (ref 0–2)
BILIRUB SERPL-MCNC: 0.3 MG/DL (ref 0.2–1)
BUN SERPL-MCNC: 21 MG/DL (ref 7–18)
BUN/CREAT SERPL: 38 (ref 12–20)
CALCIUM SERPL-MCNC: 9.2 MG/DL (ref 8.5–10.1)
CHLORIDE SERPL-SCNC: 106 MMOL/L (ref 100–108)
CHOLEST SERPL-MCNC: 219 MG/DL
CO2 SERPL-SCNC: 29 MMOL/L (ref 21–32)
CREAT SERPL-MCNC: 0.55 MG/DL (ref 0.6–1.3)
CREAT UR-MCNC: 122 MG/DL (ref 30–125)
DIFFERENTIAL METHOD BLD: ABNORMAL
EOSINOPHIL # BLD: 0.1 K/UL (ref 0–0.4)
EOSINOPHIL NFR BLD: 1 % (ref 0–5)
ERYTHROCYTE [DISTWIDTH] IN BLOOD BY AUTOMATED COUNT: 14.6 % (ref 11.6–14.5)
GLOBULIN SER CALC-MCNC: 3.1 G/DL (ref 2–4)
GLUCOSE SERPL-MCNC: 145 MG/DL (ref 74–99)
HBA1C MFR BLD: 7.4 % (ref 4.2–5.6)
HCT VFR BLD AUTO: 38.4 % (ref 35–45)
HDLC SERPL-MCNC: 70 MG/DL (ref 40–60)
HDLC SERPL: 3.1 {RATIO} (ref 0–5)
HGB BLD-MCNC: 12.5 G/DL (ref 12–16)
LDLC SERPL CALC-MCNC: 131.4 MG/DL (ref 0–100)
LIPID PROFILE,FLP: ABNORMAL
LYMPHOCYTES # BLD: 2.5 K/UL (ref 0.9–3.6)
LYMPHOCYTES NFR BLD: 46 % (ref 21–52)
MCH RBC QN AUTO: 26.8 PG (ref 24–34)
MCHC RBC AUTO-ENTMCNC: 32.6 G/DL (ref 31–37)
MCV RBC AUTO: 82.4 FL (ref 74–97)
MICROALBUMIN UR-MCNC: 4.23 MG/DL (ref 0–3)
MICROALBUMIN/CREAT UR-RTO: 35 MG/G (ref 0–30)
MONOCYTES # BLD: 0.5 K/UL (ref 0.05–1.2)
MONOCYTES NFR BLD: 9 % (ref 3–10)
NEUTS SEG # BLD: 2.5 K/UL (ref 1.8–8)
NEUTS SEG NFR BLD: 44 % (ref 40–73)
PLATELET # BLD AUTO: 204 K/UL (ref 135–420)
PMV BLD AUTO: 12.3 FL (ref 9.2–11.8)
POTASSIUM SERPL-SCNC: 4.1 MMOL/L (ref 3.5–5.5)
PROT SERPL-MCNC: 6.8 G/DL (ref 6.4–8.2)
RBC # BLD AUTO: 4.66 M/UL (ref 4.2–5.3)
SODIUM SERPL-SCNC: 142 MMOL/L (ref 136–145)
TRIGL SERPL-MCNC: 88 MG/DL (ref ?–150)
VLDLC SERPL CALC-MCNC: 17.6 MG/DL
WBC # BLD AUTO: 5.6 K/UL (ref 4.6–13.2)

## 2019-07-03 PROCEDURE — 85025 COMPLETE CBC W/AUTO DIFF WBC: CPT

## 2019-07-03 PROCEDURE — 82306 VITAMIN D 25 HYDROXY: CPT

## 2019-07-03 PROCEDURE — 80053 COMPREHEN METABOLIC PANEL: CPT

## 2019-07-03 PROCEDURE — 82043 UR ALBUMIN QUANTITATIVE: CPT

## 2019-07-03 PROCEDURE — 80061 LIPID PANEL: CPT

## 2019-07-03 PROCEDURE — 86803 HEPATITIS C AB TEST: CPT

## 2019-07-03 PROCEDURE — 83036 HEMOGLOBIN GLYCOSYLATED A1C: CPT

## 2019-07-03 PROCEDURE — 36415 COLL VENOUS BLD VENIPUNCTURE: CPT

## 2019-07-04 LAB
HCV AB SER IA-ACNC: 0.02 INDEX
HCV AB SERPL QL IA: NEGATIVE
HCV COMMENT,HCGAC: NORMAL

## 2019-07-06 ENCOUNTER — OFFICE VISIT (OUTPATIENT)
Dept: FAMILY MEDICINE CLINIC | Age: 69
End: 2019-07-06

## 2019-07-06 VITALS
BODY MASS INDEX: 30.99 KG/M2 | TEMPERATURE: 97.7 F | DIASTOLIC BLOOD PRESSURE: 72 MMHG | RESPIRATION RATE: 15 BRPM | OXYGEN SATURATION: 100 % | HEART RATE: 85 BPM | HEIGHT: 62 IN | WEIGHT: 168.4 LBS | SYSTOLIC BLOOD PRESSURE: 111 MMHG

## 2019-07-06 DIAGNOSIS — M47.22 OSTEOARTHRITIS OF SPINE WITH RADICULOPATHY, CERVICAL REGION: ICD-10-CM

## 2019-07-06 DIAGNOSIS — E55.9 VITAMIN D DEFICIENCY: ICD-10-CM

## 2019-07-06 DIAGNOSIS — E11.8 TYPE 2 DIABETES MELLITUS WITH COMPLICATION, WITHOUT LONG-TERM CURRENT USE OF INSULIN (HCC): ICD-10-CM

## 2019-07-06 DIAGNOSIS — M81.0 OSTEOPOROSIS, UNSPECIFIED OSTEOPOROSIS TYPE, UNSPECIFIED PATHOLOGICAL FRACTURE PRESENCE: ICD-10-CM

## 2019-07-06 DIAGNOSIS — M54.2 NECK PAIN: Primary | ICD-10-CM

## 2019-07-06 DIAGNOSIS — E11.8 CONTROLLED TYPE 2 DIABETES MELLITUS WITH COMPLICATION, WITHOUT LONG-TERM CURRENT USE OF INSULIN (HCC): ICD-10-CM

## 2019-07-06 DIAGNOSIS — M50.30 DDD (DEGENERATIVE DISC DISEASE), CERVICAL: ICD-10-CM

## 2019-07-06 DIAGNOSIS — E78.2 MIXED HYPERLIPIDEMIA: ICD-10-CM

## 2019-07-06 DIAGNOSIS — I10 ESSENTIAL HYPERTENSION: ICD-10-CM

## 2019-07-06 RX ORDER — METFORMIN HYDROCHLORIDE 1000 MG/1
1000 TABLET ORAL 2 TIMES DAILY WITH MEALS
Qty: 180 TAB | Refills: 1 | Status: SHIPPED | OUTPATIENT
Start: 2019-07-06 | End: 2019-11-26 | Stop reason: SDUPTHER

## 2019-07-06 RX ORDER — ERGOCALCIFEROL 1.25 MG/1
50000 CAPSULE ORAL
Qty: 12 CAP | Refills: 0 | Status: SHIPPED | OUTPATIENT
Start: 2019-07-06 | End: 2020-07-24

## 2019-07-06 RX ORDER — LISINOPRIL AND HYDROCHLOROTHIAZIDE 12.5; 2 MG/1; MG/1
1 TABLET ORAL 2 TIMES DAILY
Qty: 90 TAB | Refills: 1 | Status: SHIPPED | OUTPATIENT
Start: 2019-07-06 | End: 2019-11-26 | Stop reason: SDUPTHER

## 2019-07-06 NOTE — PROGRESS NOTES
Jeannette Rai is a 68 y.o. female (: 1950) presenting to address:    Chief Complaint   Patient presents with    Back Pain    Neck Pain       Vitals:    19 1628   BP: 111/72   Pulse: 85   Resp: 15   Temp: 97.7 °F (36.5 °C)   TempSrc: Oral   SpO2: 100%   Weight: 168 lb 6.4 oz (76.4 kg)   Height: 5' 1.5\" (1.562 m)   PainSc:   0 - No pain       Hearing/Vision:   No exam data present    Learning Assessment:     Learning Assessment 2019   PRIMARY LEARNER Child(verónica)   PRIMARY LANGUAGE OTHER (COMMENT)   LEARNER PREFERENCE PRIMARY PICTURES     LISTENING     VIDEOS     DEMONSTRATION   ANSWERED BY daughter   RELATIONSHIP OTHER     Depression Screening:     3 most recent PHQ Screens 2019   Little interest or pleasure in doing things Not at all   Feeling down, depressed, irritable, or hopeless Not at all   Total Score PHQ 2 0     Fall Risk Assessment:     Fall Risk Assessment, last 12 mths 2019   Able to walk? Yes   Fall in past 12 months? No     Abuse Screening:     Abuse Screening Questionnaire 2019   Do you ever feel afraid of your partner? N   Are you in a relationship with someone who physically or mentally threatens you? N   Is it safe for you to go home? Y     Coordination of Care Questionaire:   1. Have you been to the ER, urgent care clinic since your last visit? Hospitalized since your last visit? NO    2. Have you seen or consulted any other health care providers outside of the [address] [place] [address] since your last visit? Include any pap smears or colon screening.  NO

## 2019-07-06 NOTE — PROGRESS NOTES
Luciana Neville     Chief Complaint   Patient presents with    Back Pain    Neck Pain     Vitals:    07/06/19 1628   BP: 111/72   Pulse: 85   Resp: 15   Temp: 97.7 °F (36.5 °C)   TempSrc: Oral   SpO2: 100%   Weight: 168 lb 6.4 oz (76.4 kg)   Height: 5' 1.5\" (1.562 m)   PainSc:   0 - No pain         HPI: , patient is here for follow-up she is a, but her daughter, she rodney also following up on lab results that were done recently. She has she has neck pain degenerative joint disease of her cervical spine, and upper back pain as well patient was referred previously last year to pain managementpatient never made an appointment!!    Now she is requesting to be referred again to  is to pain management for possible injections injections, she will need a new referral regarding her insurance issues. Blood result has been discussed with patient hemoglobin A1c is 7 is still well controlled but it has increased from last  blood work about 1 year ago patient is consistent with metformin 1000 mg twice daily dose will not be increased or no addition of new medication patient she said to be more strict with her diet and exercise she does walk daily for have an hour, vitamin D level is below normal levels vitamin D level is below normal level. Patient has refused and declined mammogram understanding the risk of missing a breast lump that can be cancerous. Patient also refused colon cancer screening refused stool testing, patient understand the risk of missing colon cancer. Patient on daughter are aware of the risks of not doing screening test.      Patient also refused pneumonia vaccine today I recommended that that she should get it when she get her flu vaccine.     Patient has refused shingles vaccine today          Past Medical History:   Diagnosis Date    Diabetes ([address]     Hypercholesteremia     Hypertension      Past Surgical History:   Procedure Laterality Date    HX CHOLECYSTECTOMY       Social History Tobacco Use    Smoking status: Never Smoker    Smokeless tobacco: Never Used   Substance Use Topics    Alcohol use: No       Family History   Problem Relation Age of Onset    Diabetes Mother     Hypertension Mother        Review of Systems   Constitutional: Positive for malaise/fatigue. Negative for chills, fever and weight loss. HENT: Negative for congestion, ear discharge, ear pain, hearing loss, nosebleeds, sinus pain and sore throat. Eyes: Negative for blurred vision, double vision and discharge. Respiratory: Negative for cough, hemoptysis, sputum production and shortness of breath. Cardiovascular: Negative for chest pain, palpitations, claudication and leg swelling. Gastrointestinal: Negative for abdominal pain, constipation, diarrhea, nausea and vomiting. Genitourinary: Negative for dysuria, frequency and urgency. Musculoskeletal: Positive for myalgias and neck pain. Negative for back pain and joint pain. Skin: Negative for itching and rash. Neurological: Positive for tremors. Negative for dizziness, tingling, sensory change, speech change, focal weakness, weakness and headaches. Psychiatric/Behavioral: Negative for depression, hallucinations, substance abuse and suicidal ideas. The patient has insomnia. The patient is not nervous/anxious. Physical Exam   Constitutional: She is oriented to person, place, and time. She appears well-developed and well-nourished. No distress. HENT:   Head: Normocephalic and atraumatic. Mouth/Throat: Oropharynx is clear and moist. No oropharyngeal exudate. Eyes: Pupils are equal, round, and reactive to light. Conjunctivae are normal. Right eye exhibits no discharge. Left eye exhibits no discharge. No scleral icterus. Neck: Normal range of motion. Neck supple. No thyromegaly present. Cardiovascular: Normal rate, regular rhythm and normal heart sounds. No murmur heard.   Pulmonary/Chest: Effort normal and breath sounds normal. No respiratory distress. She has no wheezes. She has no rales. She exhibits no tenderness. Abdominal: Soft. She exhibits no distension. There is no tenderness. There is no rebound. Musculoskeletal: Normal range of motion. She exhibits no edema, tenderness or deformity. Lymphadenopathy:     She has no cervical adenopathy. Neurological: She is alert and oriented to person, place, and time. No cranial nerve deficit. Coordination normal.   Skin: Skin is warm and dry. No rash noted. She is not diaphoretic. No erythema. No pallor. Psychiatric: She has a normal mood and affect. Her behavior is normal. Judgment and thought content normal.   Nursing note and vitals reviewed. Breasts: breasts appear normal, no suspicious masses, no skin or nipple changes or axillary nodes. Assessment and plan     Plan of care has been discussed with the patient, he agrees to the plan and verbalized understanding. All his questions were answered  More than 50% of the time spent in this visit was counseling the patient about  illness and treatment options         1. Vitamin D deficiency    - ergocalciferol (ERGOCALCIFEROL) 50,000 unit capsule; Take 1 Cap by mouth every seven (7) days. Dispense: 12 Cap; Refill: 0    2. Neck pain    - REFERRAL TO PAIN MANAGEMENT  - metFORMIN (GLUCOPHAGE) 1,000 mg tablet; Take 1 Tab by mouth two (2) times daily (with meals). Dispense: 180 Tab; Refill: 1    3. Osteoarthritis of spine with radiculopathy, cervical region    - REFERRAL TO PAIN MANAGEMENT  - metFORMIN (GLUCOPHAGE) 1,000 mg tablet; Take 1 Tab by mouth two (2) times daily (with meals). Dispense: 180 Tab; Refill: 1    4. DDD (degenerative disc disease), cervical    - REFERRAL TO PAIN MANAGEMENT  - metFORMIN (GLUCOPHAGE) 1,000 mg tablet; Take 1 Tab by mouth two (2) times daily (with meals). Dispense: 180 Tab; Refill: 1    5. Mixed hyperlipidemia      6.  Controlled type 2 diabetes mellitus with complication, without long-term current use of insulin ([facility] [address]      7. Essential hypertension    - lisinopril-hydroCHLOROthiazide (PRINZIDE, ZESTORETIC) 20-12.5 mg per tablet; Take 1 Tab by mouth two (2) times a day. Dispense: 90 Tab; Refill: 1    8. Osteoporosis, unspecified osteoporosis type, unspecified pathological fracture presence    - DEXA BONE DENSITY STUDY AXIAL; Future    9. Type 2 diabetes mellitus with complication, without long-term current use of insulin ([facility])      Current Outpatient Medications   Medication Sig Dispense Refill    ergocalciferol (ERGOCALCIFEROL) 50,000 unit capsule Take 1 Cap by mouth every seven (7) days. 12 Cap 0    lisinopril-hydroCHLOROthiazide (PRINZIDE, ZESTORETIC) 20-12.5 mg per tablet Take 1 Tab by mouth two (2) times a day. 90 Tab 1    metFORMIN (GLUCOPHAGE) 1,000 mg tablet Take 1 Tab by mouth two (2) times daily (with meals). 180 Tab 1    aspirin delayed-release 81 mg tablet Take 81 mg by mouth daily. Patient Active Problem List    Diagnosis Date Noted    Controlled type 2 diabetes mellitus with complication, without long-term current use of insulin ([facility] 07/02/2019    DDD (degenerative disc disease), cervical 08/27/2018    Essential hypertension 08/27/2018    Mixed hyperlipidemia 08/27/2018    Osteoporosis 08/27/2018    Irritable bowel syndrome with both constipation and diarrhea 08/27/2018    Osteoarthritis of both knees 08/27/2018     Results for orders placed or performed during the hospital encounter of 07/03/19   CBC WITH AUTOMATED DIFF   Result Value Ref Range    WBC 5.6 4.6 - 13.2 K/uL    RBC 4.66 4.20 - 5.30 M/uL    HGB 12.5 12.0 - 16.0 g/dL    HCT 38.4 35.0 - 45.0 %    MCV 82.4 74.0 - 97.0 FL    MCH 26.8 24.0 - 34.0 PG    MCHC 32.6 31.0 - 37.0 g/dL    RDW 14.6 (H) 11.6 - 14.5 %    PLATELET 336 173 - 854 K/uL    MPV 12.3 (H) 9.2 - 11.8 FL    NEUTROPHILS 44 40 - 73 %    LYMPHOCYTES 46 21 - 52 %    MONOCYTES 9 3 - 10 %    EOSINOPHILS 1 0 - 5 %    BASOPHILS 0 0 - 2 %    ABS.  NEUTROPHILS 2.5 1.8 - 8.0 K/UL    ABS. LYMPHOCYTES 2.5 0.9 - 3.6 K/UL    ABS. MONOCYTES 0.5 0.05 - 1.2 K/UL    ABS. EOSINOPHILS 0.1 0.0 - 0.4 K/UL    ABS. BASOPHILS 0.0 0.0 - 0.1 K/UL    DF AUTOMATED     METABOLIC PANEL, COMPREHENSIVE   Result Value Ref Range    Sodium 142 136 - 145 mmol/L    Potassium 4.1 3.5 - 5.5 mmol/L    Chloride 106 100 - 108 mmol/L    CO2 29 21 - 32 mmol/L    Anion gap 7 3.0 - 18 mmol/L    Glucose 145 (H) 74 - 99 mg/dL    BUN 21 (H) 7.0 - 18 MG/DL    Creatinine 0.55 (L) 0.6 - 1.3 MG/DL    BUN/Creatinine ratio 38 (H) 12 - 20      GFR est AA >60 >60 ml/min/1.73m2    GFR est non-AA >60 >60 ml/min/1.73m2    Calcium 9.2 8.5 - 10.1 MG/DL    Bilirubin, total 0.3 0.2 - 1.0 MG/DL    ALT (SGPT) 17 13 - 56 U/L    AST (SGOT) 17 15 - 37 U/L    Alk. phosphatase 78 45 - 117 U/L    Protein, total 6.8 6.4 - 8.2 g/dL    Albumin 3.7 3.4 - 5.0 g/dL    Globulin 3.1 2.0 - 4.0 g/dL    A-G Ratio 1.2 0.8 - 1.7     LIPID PANEL   Result Value Ref Range    LIPID PROFILE          Cholesterol, total 219 (H) <200 MG/DL    Triglyceride 88 <150 MG/DL    HDL Cholesterol 70 (H) 40 - 60 MG/DL    LDL, calculated 131.4 (H) 0 - 100 MG/DL    VLDL, calculated 17.6 MG/DL    CHOL/HDL Ratio 3.1 0 - 5.0     HEMOGLOBIN A1C W/O EAG   Result Value Ref Range    Hemoglobin A1c 7.4 (H) 4.2 - 5.6 %   VITAMIN D, 25 HYDROXY   Result Value Ref Range    Vitamin D 25-Hydroxy 27.5 (L) 30 - 100 ng/mL   HEPATITIS C AB   Result Value Ref Range    Hepatitis C virus Ab 0.02 <0.80 Index    Hep C  virus Ab Interp.  NEGATIVE  NEG      Hep C  virus Ab comment         MICROALBUMIN, UR, RAND W/ MICROALB/CREAT RATIO   Result Value Ref Range    Microalbumin,urine random 4.23 (H) 0 - 3.0 MG/DL    Creatinine, urine 122.00 30 - 125 mg/dL    Microalbumin/Creat ratio (mg/g creat) 35 (H) 0 - 30 mg/g     Hospital Outpatient Visit on 07/03/2019   Component Date Value Ref Range Status    WBC 07/03/2019 5.6  4.6 - 13.2 K/uL Final    RBC 07/03/2019 4.66  4.20 - 5.30 M/uL Final    HGB 07/03/2019 12.5  12.0 - 16.0 g/dL Final    HCT 07/03/2019 38.4  35.0 - 45.0 % Final    MCV 07/03/2019 82.4  74.0 - 97.0 FL Final    MCH 07/03/2019 26.8  24.0 - 34.0 PG Final    MCHC 07/03/2019 32.6  31.0 - 37.0 g/dL Final    RDW 07/03/2019 14.6* 11.6 - 14.5 % Final    PLATELET 58/61/6752 294  135 - 420 K/uL Final    MPV 07/03/2019 12.3* 9.2 - 11.8 FL Final    NEUTROPHILS 07/03/2019 44  40 - 73 % Final    LYMPHOCYTES 07/03/2019 46  21 - 52 % Final    MONOCYTES 07/03/2019 9  3 - 10 % Final    EOSINOPHILS 07/03/2019 1  0 - 5 % Final    BASOPHILS 07/03/2019 0  0 - 2 % Final    ABS. NEUTROPHILS 07/03/2019 2.5  1.8 - 8.0 K/UL Final    ABS. LYMPHOCYTES 07/03/2019 2.5  0.9 - 3.6 K/UL Final    ABS. MONOCYTES 07/03/2019 0.5  0.05 - 1.2 K/UL Final    ABS. EOSINOPHILS 07/03/2019 0.1  0.0 - 0.4 K/UL Final    ABS. BASOPHILS 07/03/2019 0.0  0.0 - 0.1 K/UL Final    DF 07/03/2019 AUTOMATED    Final    Sodium 07/03/2019 142  136 - 145 mmol/L Final    Potassium 07/03/2019 4.1  3.5 - 5.5 mmol/L Final    Chloride 07/03/2019 106  100 - 108 mmol/L Final    CO2 07/03/2019 29  21 - 32 mmol/L Final    Anion gap 07/03/2019 7  3.0 - 18 mmol/L Final    Glucose 07/03/2019 145* 74 - 99 mg/dL Final    BUN 07/03/2019 21* 7.0 - 18 MG/DL Final    Creatinine 07/03/2019 0.55* 0.6 - 1.3 MG/DL Final    BUN/Creatinine ratio 07/03/2019 38* 12 - 20   Final    GFR est AA 07/03/2019 >60  >60 ml/min/1.73m2 Final    GFR est non-AA 07/03/2019 >60  >60 ml/min/1.73m2 Final    Comment: (NOTE)  Estimated GFR is calculated using the Modification of Diet in Renal   Disease (MDRD) Study equation, reported for both  Americans   (GFRAA) and non- Americans (GFRNA), and normalized to 1.73m2   body surface area. The physician must decide which value applies to   the patient. The MDRD study equation should only be used in   individuals age 25 or older.  It has not been validated for the   following: pregnant women, patients with serious comorbid conditions,   or on certain medications, or persons with extremes of body size,   muscle mass, or nutritional status.  Calcium 07/03/2019 9.2  8.5 - 10.1 MG/DL Final    Bilirubin, total 07/03/2019 0.3  0.2 - 1.0 MG/DL Final    ALT (SGPT) 07/03/2019 17  13 - 56 U/L Final    AST (SGOT) 07/03/2019 17  15 - 37 U/L Final    Alk. phosphatase 07/03/2019 78  45 - 117 U/L Final    Protein, total 07/03/2019 6.8  6.4 - 8.2 g/dL Final    Albumin 07/03/2019 3.7  3.4 - 5.0 g/dL Final    Globulin 07/03/2019 3.1  2.0 - 4.0 g/dL Final    A-G Ratio 07/03/2019 1.2  0.8 - 1.7   Final    LIPID PROFILE 07/03/2019        Final    Cholesterol, total 07/03/2019 219* <200 MG/DL Final    Triglyceride 07/03/2019 88  <150 MG/DL Final    Comment: The drugs N-acetylcysteine (NAC) and  Metamiszole have been found to cause falsely  low results in this chemical assay. Please  be sure to submit blood samples obtained  BEFORE administration of either of these  drugs to assure correct results.  HDL Cholesterol 07/03/2019 70* 40 - 60 MG/DL Final    LDL, calculated 07/03/2019 131.4* 0 - 100 MG/DL Final    VLDL, calculated 07/03/2019 17.6  MG/DL Final    CHOL/HDL Ratio 07/03/2019 3.1  0 - 5.0   Final    Hemoglobin A1c 07/03/2019 7.4* 4.2 - 5.6 % Final    Comment: (NOTE)  HbA1C Interpretive Ranges  <5.7              Normal  5.7 - 6.4         Consider Prediabetes  >6.5              Consider Diabetes      Vitamin D 25-Hydroxy 07/03/2019 27.5* 30 - 100 ng/mL Final    Comment: (NOTE)  Deficiency               <20 ng/mL  Insufficiency          20-30 ng/mL  Sufficient             ng/mL  Possible toxicity       >100 ng/mL    The Method used is Siemens Advia Centaur currently standardized to a   Center of Disease Control and Prevention (CDC) certified reference   [address]. Samples containing fluorescein dye can produce falsely   elevated values when tested with the ADVIA Centaur Vitamin D Assay.    It is recommended that results in the toxic range, >100 ng/mL, be   retested 72 hours post fluorescein exposure.  Hepatitis C virus Ab 07/03/2019 0.02  <0.80 Index Final    Hep C  virus Ab Interp. 07/03/2019 NEGATIVE   NEG   Final    Hep C  virus Ab comment 07/03/2019        Final    Comment: Index <0.80. ......................... Lord Jhon Negative  Index > or = to 0.80 and <1.00. .... Lord Jhon Lord Jhon Equivocal  Index >1.00. ......................... Lord Jhon Positive          For Equivocal or Positive results, confirmation with Hepatitis C RNA by PCR or bDNA is suggested.  Microalbumin,urine random 07/03/2019 4.23* 0 - 3.0 MG/DL Final    Creatinine, urine 07/03/2019 122.00  30 - 125 mg/dL Final    Microalbumin/Creat ratio (mg/g cre* 07/03/2019 35* 0 - 30 mg/g Final          Follow-up and Dispositions    · Return in about 3 months (around 10/6/2019).

## 2019-10-01 ENCOUNTER — TELEPHONE (OUTPATIENT)
Dept: FAMILY MEDICINE CLINIC | Age: 69
End: 2019-10-01

## 2019-10-01 DIAGNOSIS — H81.10 BENIGN PAROXYSMAL POSITIONAL VERTIGO, UNSPECIFIED LATERALITY: Primary | ICD-10-CM

## 2019-10-01 NOTE — TELEPHONE ENCOUNTER
Notified patient. She states she is still having dizziness. She states she will await to here for appointments for the referrals. No further questions or concerns at this time.

## 2019-10-01 NOTE — TELEPHONE ENCOUNTER
Patient was seen at the emergency room on September 17 and diagnosed with benign positional vertigo. Investigation and blood work were reviewed.   Patient is still having dizziness        She will be referred for physical therapy and ENT

## 2019-10-17 ENCOUNTER — HOSPITAL ENCOUNTER (OUTPATIENT)
Dept: PHYSICAL THERAPY | Age: 69
Discharge: HOME | End: 2019-10-17
Payer: MEDICAID

## 2019-10-17 PROCEDURE — 97161 PT EVAL LOW COMPLEX 20 MIN: CPT

## 2019-10-17 PROCEDURE — 97530 THERAPEUTIC ACTIVITIES: CPT

## 2019-10-17 NOTE — PROGRESS NOTES
PHYSICAL THERAPY - DAILY TREATMENT NOTE    Patient Name: Alexander Peng        Date: 10/17/2019  : 1950   Yes Patient  Verified  Visit #:   1   of   8  Insurance: Payor: Segun Downing / Plan: [address] / Product Type: Managed Care Medicaid /      In time: 5:13 Out time: 5:44   Total Treatment Time: 31     Medicare/BCBS Time Tracking (below)   Total Timed Codes (min):  8 1:1 Treatment Time:  8     TREATMENT AREA =  Vertigo [R42]    SUBJECTIVE  Pain Level (on 0 to 10 scale):  8  / 10   Medication Changes/New allergies or changes in medical history, any new surgeries or procedures?    no  If yes, update Summary List   Subjective Functional Status/Changes:  []  No changes reported     See POC         OBJECTIVE  8 min Therapeutic Activity: Education in anatomy and physiology of the cervical spine. Review of common causes of neck pain - postural stressors prolonged sitting in slouched position, general poor posture, prolonged computer work, and driving with FHP. Review of centralization and peripheralization principles as well as C/S radicular patterns. Pt educated on sitting and sleeping posture modification to reduce musculoskeletal strain with sitting ADL's. Recommended sitting with lumbar Niurka roll to maintain lordosis in order to prevent rounding of the back and consequently preventing FHP in turn improving posture of the neck. Urged patient to avoid sleeping in prone lying as prolonged sleeping with the neck turned to one side places strain on the ST and surrounding joints. Discussed optimal pillow thickness that fills the natural hollow in the contour of the neck between the neck and shoulder suggested without tilting the head or lifting it up. Rationale:    Improve positioning of C/S to improve the patients ability to tolerate prolonged static resting positions.        Billed With/As:   [x] TE   [] TA   [] Neuro   [] Self Care Patient Education: [x] Review HEP    [x] Progressed/Changed HEP based on:   [x] positioning   [x] body mechanics   [] transfers   [] heat/ice application    [] other:      Other Objective/Functional Measures:    See POC     Post Treatment Pain Level (on 0 to 10) scale:   8  / 10     ASSESSMENT  Assessment/Changes in Function:     See POC     []  See Progress Note/Recertification   Patient will continue to benefit from skilled PT services to modify and progress therapeutic interventions, address functional mobility deficits, address ROM deficits, address strength deficits, analyze and address soft tissue restrictions, analyze and cue movement patterns, analyze and modify body mechanics/ergonomics, assess and modify postural abnormalities and instruct in home and community integration to attain remaining goals.    Progress toward goals / Updated goals:    See newly established goals in POC     PLAN  [x]  Upgrade activities as tolerated yes Continue plan of care   []  Discharge due to :    []  Other:      Therapist: Tess Thomas    Date: 10/17/2019 Time: 1:52 PM     Future Appointments   Date Time Provider Salome Chakraborty   10/17/2019  5:00 PM Desmond Guthrie

## 2019-10-17 NOTE — PROGRESS NOTES
[facility] [address], [facility] - Phone: [phone]  Fax: [phone] / [address] [facility]  Patient Name: Adrienne Zarate : 1950   Medical   Diagnosis: Vertigo [R42] Treatment Diagnosis: Vertigo [R42]   Neck Pain    Onset Date:      Referral Source: Aanmaria Zelaya MD Start of Care [facility]): 10/17/2019   Prior Hospitalization: See medical history Provider #: [id number]   Prior Level of Function: Pain free reading and writing   Comorbidities: Arthritis, HTN, DM   Medications: Verified on Patient Summary List   The Plan of Care and following information is based on the information from the initial evaluation.   ===========================================================================================  Assessment / key information:  Patient is a 68 y.o. female who presents to [facility] at [facility]. with diagnosis of Vertigo [R42]. Patient reports neck pain began last year with insious onset. Attributes neck pain to prolonged reading and writing occupational duties. X-ray imaging of C/S revealed moderately severe degenerative changes. C/S pain is described as constant and located on the EMMETT side of the neck and EMMETT sides of the mid to upper thoracic spine. Patient denies numbness and tingling in EMMETT UEs. Pain is rated as a 3/10 at the best, 8/10 currently, and 10/10 at the worst. C/S pain increases with prolonged sitting, reading, writing, and sleeping, decreases with pillow support. Upon objective evaluation patient demonstrates grossly impaired and painful C/S AROM (flexion 28 R p!, extension 46 R C/S p!, R/L SB 25/25 EMMETT C/S p!, and R/L Rot 65/55 L C/S p!), increased T/S kyphosis, postural deviations of cervical protrusion and rounded shoulders, EMMETT shoulder AROM WNLs, and impaired flexibility of EMMETT UT and levator scapula.  Patient can benefit from PT interventions to improve C/S AROM, cervical flexor strength, flexibility, joint mobility, decrease C/S pain, tone, and TTP to facilitate ADL's & overall functional status.     ===========================================================================================  Eval Complexity: History MEDIUM  Complexity : 1-2 comorbidities / personal factors will impact the outcome/ POC ;  Examination  HIGH Complexity : 4+ Standardized tests and measures addressing body structure, function, activity limitation and / or participation in recreation ; Presentation LOW Complexity : Stable, uncomplicated ;  Decision Making Other outcome measures Objective Measurements  MEDIUM; Overall Complexity LOW   Problem List: pain affecting function, decrease ROM, decrease strength, impaired gait/ balance, decrease ADL/ functional abilitiies, decrease activity tolerance, decrease flexibility/ joint mobility and decrease transfer abilities   Treatment Plan may include any combination of the following: Therapeutic exercise, Therapeutic activities, Neuromuscular re-education, Physical agent/modality, Gait/balance training, Manual therapy, Patient education, Self Care training, Functional mobility training, Home safety training and Stair training  Patient / Family readiness to learn indicated by: asking questions, trying to perform skills and interest  Persons(s) to be included in education: patient (P)  Barriers to Learning/Limitations: yes;  language  Measures taken, if barriers to learning:  - Jemma   Patient Goal (s): Doesn't want to feel the pain   Patient self reported health status: fair  Rehabilitation Potential: good   Short Term Goals: To be accomplished in  2  weeks:  1) Establish HEP. 2) Patient will report 25% improvement in sleeping quality in order to decrease sx with resting.  Long Term Goals: To be accomplished in  4  weeks:  1) Patient to be independent with HEP.   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 Frequency / Duration:   Patient to be seen  2  times per week for 4  weeks:  Patient / Caregiver education and instruction: self care, activity modification and exercises  Therapist Signature: Edy Ugarte Date: 25/68/1587   Certification Period: n/a Time: 5:19 PM   ===========================================================================================  I certify that the above Physical Therapy Services are being furnished while the patient is under my care. I agree with the treatment plan and certify that this therapy is necessary. Physician Signature:        Date:       Time:     Please sign and return to [facility] at [facility]. or you may fax the signed copy to [phone]. Thank you.

## 2019-10-21 ENCOUNTER — TELEPHONE (OUTPATIENT)
Dept: FAMILY MEDICINE CLINIC | Age: 69
End: 2019-10-21

## 2019-10-21 DIAGNOSIS — M54.2 NECK PAIN: Primary | ICD-10-CM

## 2019-10-21 NOTE — TELEPHONE ENCOUNTER
[facility] called pt was referred to them for vertigo but when she arrived she stated she was being seen for neck pain. PT is requesting a new referral for neck pain ASAP. Please advise.

## 2019-10-23 ENCOUNTER — HOSPITAL ENCOUNTER (OUTPATIENT)
Dept: PHYSICAL THERAPY | Age: 69
Discharge: HOME | End: 2019-10-23
Payer: MEDICAID

## 2019-10-23 PROCEDURE — 97530 THERAPEUTIC ACTIVITIES: CPT

## 2019-10-23 PROCEDURE — 97110 THERAPEUTIC EXERCISES: CPT

## 2019-10-23 NOTE — PROGRESS NOTES
PHYSICAL THERAPY - DAILY TREATMENT NOTE    Patient Name: Gracy Hendrix        Date: 10/23/2019  : 1950   yes Patient  Verified  Visit #:   2   of   8  Insurance: Payor: Elaine Menonn / Plan: [address] / Product Type: Managed Care Medicaid /      In time: 10:20 Out time: 10:56   Total Treatment Time: 36     Medicare/BCBS Time Tracking (below)   Total Timed Codes (min):  36 1:1 Treatment Time:  36     TREATMENT AREA =  Neck pain [M54.2]    SUBJECTIVE  Pain Level (on 0 to 10 scale):  5  / 10   Medication Changes/New allergies or changes in medical history, any new surgeries or procedures?    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 Pt educated on sitting posture during repeated extension/ Recommended Repeated Retraction in sitting with OP every 2 hours 10-20 repetitions   Rationale:    Improve positioning of C/S to improve the patients ability to tolerate prolonged static resting positions.        Billed With/As:   [x] TE   [] TA   [] Neuro   [] Self Care Patient Education: [x] Review HEP    [x] Progressed/Changed HEP based on:   [] positioning   [x] body mechanics   [] transfers   [] heat/ice application    [] other:      Other Objective/Functional Measures:      C/S & UE MMT/AROM Pre Tx Post RRIS with OP   Pain location/sx L C/S eased   R/L Sh [place]aption MMT 4/5 p! 4+/5   R/L Sh ER MMT 4-/5 NT/5   C/S Forward flexion  65% L C/S NT   C/S Extension  35% NT   C/S R/L SB 50/50% L C.S NT   C/S R/L Rotation 85/75% 90%   EMMETT Spurling's = (+) EMMETT (L incr pain greater than right)     Post Treatment Pain Level (on 0 to 10) scale:   5  / 10 center     ASSESSMENT  Assessment/Changes in Function:     RRIS brought sx from L UT to the L side of the C/S. RRIS with OP centralized sx. Good tolerance for therex with no incr in pain s/p session      []  See Progress Note/Recertification   Patient will continue to benefit from skilled PT services to modify and progress therapeutic interventions, address functional mobility deficits, address ROM deficits, address strength deficits, analyze and address soft tissue restrictions, analyze and cue movement patterns, analyze and modify body mechanics/ergonomics, assess and modify postural abnormalities and instruct in home and community integration to attain remaining goals. Progress toward goals / Updated goals:    First visit after initial evaluation. Progress tx per POC. PLAN  [x]  Upgrade activities as tolerated yes Continue plan of care   []  Discharge due to :    []  Other:      Therapist: Ragini Paredes    Date: 10/23/2019 Time: 10:35 AM     No future appointments.

## 2019-10-29 ENCOUNTER — HOSPITAL ENCOUNTER (OUTPATIENT)
Dept: PHYSICAL THERAPY | Age: 69
Discharge: HOME | End: 2019-10-29
Payer: MEDICAID

## 2019-10-29 PROCEDURE — 97110 THERAPEUTIC EXERCISES: CPT

## 2019-10-29 NOTE — PROGRESS NOTES
PHYSICAL THERAPY - DAILY TREATMENT NOTE    Patient Name: Shay Mo        Date: 10/29/2019  : 1950   yes Patient  Verified  Visit #:   3   of   8  Insurance: Payor: [address] / Plan: [address] / Product Type: Managed Care Medicaid /      In time: 4:09 PM Out time: 4:49 PM   Total Treatment Time: 40     Medicare/BCBS Time Tracking (below)   Total Timed Codes (min):  n/a 1:1 Treatment Time:  n/a     TREATMENT AREA =  Neck pain [M54.2]    SUBJECTIVE  Pain Level (on 0 to 10 scale): 6  / 10   Medication Changes/New allergies or changes in medical history, any new surgeries or procedures?    no  If yes, update Summary List   Subjective Functional Status/Changes:  []  No changes reported     Per patient's daughter, patient reports having pain in the middle of her neck. OBJECTIVE    Modalities Rationale:     decrease pain and increase tissue extensibility to improve patient's ability to perform household activities.     min [] Estim, type/location:                                      []  att     []  unatt     []  w/US     []  w/ice    []  w/heat    min []  Mechanical Traction: type/lbs                   []  pro   []  sup   []  int   []  cont    []  before manual    []  after manual    min []  Ultrasound, settings/location:      min []  Iontophoresis w/ dexamethasone, location:                                               []  take home patch       []  in clinic   10 Min []  Ice     [x]  Heat    location/position: To c/s in sitting post-session    min []  Vasopneumatic Device, press/temp:     min []  Other:    [x] Skin assessment post-treatment (if applicable):    [x]  intact    [x]  redness- no adverse reaction     []redness - adverse reaction:        30 min Therapeutic Exercise:  [x]  See flow sheet   Rationale:      increase ROM and increase strength 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 Added multiple spinal mobility and scapular strengthening therex to improve body mechanics with functional ADLs and writing activities. Cues for proper form due to 1st attempt. Post Treatment Pain Level (on 0 to 10) scale:   5  / 10      ASSESSMENT  Assessment/Changes in Function:     Patient noted reduced pain level and maintained centralized neck symptoms indicating good response with PT interventions. []  See Progress Note/Recertification   Patient will continue to benefit from skilled PT services to modify and progress therapeutic interventions, address functional mobility deficits, address ROM deficits, address strength deficits, analyze and address soft tissue restrictions, analyze and cue movement patterns, analyze and modify body mechanics/ergonomics, assess and modify postural abnormalities and instruct in home and community integration to attain remaining goals. Progress toward goals / Updated goals: · Short Term Goals: To be accomplished in  2  weeks:  1) Establish HEP. Progressing 10/29; reports compliance  2) Patient will report 25% improvement in sleeping quality in order to decrease sx with resting.      · Long Term Goals: To be accomplished in  4  weeks:  1) Patient to be independent with HEP. 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        PLAN  [x]  Upgrade activities as tolerated yes Continue plan of care   []  Discharge due to :    []  Other:      Therapist: HEATHER Cobb    Date: 10/29/2019 Time: 6:11 PM     Future Appointments   Date Time Provider Salome Chakraborty   10/29/2019  4:00 PM John Osborne

## 2019-11-26 DIAGNOSIS — M47.22 OSTEOARTHRITIS OF SPINE WITH RADICULOPATHY, CERVICAL REGION: ICD-10-CM

## 2019-11-26 DIAGNOSIS — M54.2 NECK PAIN: ICD-10-CM

## 2019-11-26 DIAGNOSIS — I10 ESSENTIAL HYPERTENSION: ICD-10-CM

## 2019-11-26 DIAGNOSIS — M50.30 DDD (DEGENERATIVE DISC DISEASE), CERVICAL: ICD-10-CM

## 2019-11-26 RX ORDER — LISINOPRIL AND HYDROCHLOROTHIAZIDE 12.5; 2 MG/1; MG/1
1 TABLET ORAL DAILY
Qty: 90 TAB | Refills: 0 | Status: SHIPPED | OUTPATIENT
Start: 2019-11-26 | End: 2020-02-10

## 2019-11-26 RX ORDER — METFORMIN HYDROCHLORIDE 1000 MG/1
TABLET ORAL
Qty: 180 TAB | Refills: 0 | Status: SHIPPED | OUTPATIENT
Start: 2019-11-26 | End: 2020-05-12

## 2019-12-16 NOTE — PROGRESS NOTES
[address] [place] [address] [address], [address] [facility] - Phone: [phone]  Fax: [phone]  DISCHARGE SUMMARY  Patient Name: James Gandara : 1950   Treatment/Medical Diagnosis: Neck pain [M54.2]   Referral Source: Dominga Bliss MD     Date of Initial Visit: 10/17/19 Attended Visits: 3 Missed Visits: 1     SUMMARY OF TREATMENT  Therapeutic exercises including ROM, strengthening, stretching, manual therapy including joint and soft tissue manipulation, balance training, postural re-education, and HEP instruction. CURRENT STATUS  Patient has attended 3 PT sessions, including an initial evaluation, for neck pain. PT has included manual therapy, therapeutic exercises, patient education, body mechanics, posture modification, and home exercise program to improve C/S ROM/flexibility and UE strength as well as decrease neck and upper back symptoms. Patient's current condition is unable to be formally assessed secondary to pt not returning to PT services after 10/29/2019 treatment visit. Secondary to decreased attendance and limited compliance with participation PT services patient is appropriate for DC to home management of sx at this time. Goal/Measure of Progress Goal Met? 1.  Establish HEP to prevent further disability. Status at last Eval: Goal Established Current Status: I and compliance with HEP yes   2. Patient will report 25% improvement in sleeping quality in order to decrease sx with resting. Status at last Eval: Disturbed sleep Current Status: Unable to be assessed no       RECOMMENDATIONS  Discontinue therapy due to lack of attendance or compliance. If you have any questions/comments please contact us directly at [phone]. Thank you for allowing us to assist in the care of your patient.     Therapist Signature: Ale Humphries Date: 2019     Time: 12:26 PM     NOTE TO PHYSICIAN:  PLEASE COMPLETE THE ORDERS BELOW AND FAX TO   [facility]: [phone]  If you are unable to process this request in 24 hours please contact our office: [phone]   To ensure we are able to process the patients encounter and avoid risk of your patient receiving a bill for our services, please sign and return this discharge summary by 1/15/2020. (30days following DC date)    ___ I have read the above report and request that my patient continue as recommended.   ___ I have read the above report and request that my patient continue therapy with the following changes/special instructions:_________________________________________________________   ___ I have read the above report and request that my patient be discharged from therapy.      Physician Signature:        Date:       Time:

## 2020-02-08 DIAGNOSIS — I10 ESSENTIAL HYPERTENSION: ICD-10-CM

## 2020-02-10 RX ORDER — LISINOPRIL AND HYDROCHLOROTHIAZIDE 12.5; 2 MG/1; MG/1
TABLET ORAL
Qty: 90 TAB | Refills: 0 | Status: SHIPPED | OUTPATIENT
Start: 2020-02-10 | End: 2020-07-24 | Stop reason: SDUPTHER

## 2020-02-10 NOTE — TELEPHONE ENCOUNTER
No answer and VM box full. Patient needs an appt for f/u before further refills. Please schedule patient if she returns call.

## 2020-03-04 ENCOUNTER — OFFICE VISIT (OUTPATIENT)
Dept: FAMILY MEDICINE CLINIC | Age: 70
End: 2020-03-04

## 2020-03-04 ENCOUNTER — HOSPITAL ENCOUNTER (OUTPATIENT)
Dept: LAB | Age: 70
Discharge: HOME | End: 2020-03-04
Payer: MEDICAID

## 2020-03-04 VITALS
DIASTOLIC BLOOD PRESSURE: 80 MMHG | TEMPERATURE: 98.5 F | BODY MASS INDEX: 31.28 KG/M2 | HEART RATE: 94 BPM | SYSTOLIC BLOOD PRESSURE: 140 MMHG | HEIGHT: 62 IN | OXYGEN SATURATION: 99 % | WEIGHT: 170 LBS | RESPIRATION RATE: 16 BRPM

## 2020-03-04 DIAGNOSIS — G89.29 CHRONIC PAIN OF RIGHT KNEE: ICD-10-CM

## 2020-03-04 DIAGNOSIS — E55.9 VITAMIN D DEFICIENCY: ICD-10-CM

## 2020-03-04 DIAGNOSIS — E11.8 CONTROLLED TYPE 2 DIABETES MELLITUS WITH COMPLICATION, WITHOUT LONG-TERM CURRENT USE OF INSULIN (HCC): Primary | ICD-10-CM

## 2020-03-04 DIAGNOSIS — I10 ESSENTIAL HYPERTENSION: ICD-10-CM

## 2020-03-04 DIAGNOSIS — E78.2 MIXED HYPERLIPIDEMIA: ICD-10-CM

## 2020-03-04 DIAGNOSIS — M25.561 CHRONIC PAIN OF RIGHT KNEE: ICD-10-CM

## 2020-03-04 DIAGNOSIS — E11.8 CONTROLLED TYPE 2 DIABETES MELLITUS WITH COMPLICATION, WITHOUT LONG-TERM CURRENT USE OF INSULIN (HCC): ICD-10-CM

## 2020-03-04 LAB
25(OH)D3 SERPL-MCNC: 28 NG/ML (ref 30–100)
ALBUMIN SERPL-MCNC: 3.6 G/DL (ref 3.4–5)
ALBUMIN/GLOB SERPL: 1.1 {RATIO} (ref 0.8–1.7)
ALP SERPL-CCNC: 98 U/L (ref 45–117)
ALT SERPL-CCNC: 15 U/L (ref 13–56)
ANION GAP SERPL CALC-SCNC: 9 MMOL/L (ref 3–18)
AST SERPL-CCNC: 14 U/L (ref 10–38)
BASOPHILS # BLD: 0 K/UL (ref 0–0.1)
BASOPHILS NFR BLD: 0 % (ref 0–2)
BILIRUB SERPL-MCNC: 0.3 MG/DL (ref 0.2–1)
BUN SERPL-MCNC: 16 MG/DL (ref 7–18)
BUN/CREAT SERPL: 30 (ref 12–20)
CALCIUM SERPL-MCNC: 9.5 MG/DL (ref 8.5–10.1)
CHLORIDE SERPL-SCNC: 108 MMOL/L (ref 100–111)
CHOLEST SERPL-MCNC: 194 MG/DL
CO2 SERPL-SCNC: 27 MMOL/L (ref 21–32)
CREAT SERPL-MCNC: 0.54 MG/DL (ref 0.6–1.3)
DIFFERENTIAL METHOD BLD: NORMAL
EOSINOPHIL # BLD: 0.1 K/UL (ref 0–0.4)
EOSINOPHIL NFR BLD: 2 % (ref 0–5)
ERYTHROCYTE [DISTWIDTH] IN BLOOD BY AUTOMATED COUNT: 14.1 % (ref 11.6–14.5)
GLOBULIN SER CALC-MCNC: 3.3 G/DL (ref 2–4)
GLUCOSE SERPL-MCNC: 154 MG/DL (ref 74–99)
HBA1C MFR BLD: 7.7 % (ref 4.2–5.6)
HCT VFR BLD AUTO: 38.8 % (ref 35–45)
HDLC SERPL-MCNC: 58 MG/DL (ref 40–60)
HDLC SERPL: 3.3 {RATIO} (ref 0–5)
HGB BLD-MCNC: 12.4 G/DL (ref 12–16)
LDLC SERPL CALC-MCNC: 118 MG/DL (ref 0–100)
LIPID PROFILE,FLP: ABNORMAL
LYMPHOCYTES # BLD: 3.4 K/UL (ref 0.9–3.6)
LYMPHOCYTES NFR BLD: 48 % (ref 21–52)
MCH RBC QN AUTO: 25.9 PG (ref 24–34)
MCHC RBC AUTO-ENTMCNC: 32 G/DL (ref 31–37)
MCV RBC AUTO: 81 FL (ref 74–97)
MONOCYTES # BLD: 0.7 K/UL (ref 0.05–1.2)
MONOCYTES NFR BLD: 10 % (ref 3–10)
NEUTS SEG # BLD: 2.9 K/UL (ref 1.8–8)
NEUTS SEG NFR BLD: 40 % (ref 40–73)
PLATELET # BLD AUTO: 225 K/UL (ref 135–420)
PMV BLD AUTO: 11.7 FL (ref 9.2–11.8)
POTASSIUM SERPL-SCNC: 4.4 MMOL/L (ref 3.5–5.5)
PROT SERPL-MCNC: 6.9 G/DL (ref 6.4–8.2)
RBC # BLD AUTO: 4.79 M/UL (ref 4.2–5.3)
SODIUM SERPL-SCNC: 144 MMOL/L (ref 136–145)
TRIGL SERPL-MCNC: 90 MG/DL (ref ?–150)
VLDLC SERPL CALC-MCNC: 18 MG/DL
WBC # BLD AUTO: 7.2 K/UL (ref 4.6–13.2)

## 2020-03-04 PROCEDURE — 83036 HEMOGLOBIN GLYCOSYLATED A1C: CPT

## 2020-03-04 PROCEDURE — 85025 COMPLETE CBC W/AUTO DIFF WBC: CPT

## 2020-03-04 PROCEDURE — 80053 COMPREHEN METABOLIC PANEL: CPT

## 2020-03-04 PROCEDURE — 80061 LIPID PANEL: CPT

## 2020-03-04 PROCEDURE — 36415 COLL VENOUS BLD VENIPUNCTURE: CPT

## 2020-03-04 PROCEDURE — 82306 VITAMIN D 25 HYDROXY: CPT

## 2020-03-04 NOTE — PROGRESS NOTES
Aman Longoria is a 65 y.o. female (: 1950) presenting to address:    Chief Complaint   Patient presents with    Diabetes    Hypertension       Vitals:    20 1138   BP: (!) 170/96   Pulse: 94   Resp: 16   Temp: 98.5 °F (36.9 °C)   TempSrc: Oral   SpO2: 99%   Weight: 170 lb (77.1 kg)   Height: 5' 1.5\" (1.562 m)   PainSc:   6   PainLoc: Leg       Hearing/Vision:   No exam data present    Learning Assessment:     Learning Assessment 2019   PRIMARY LEARNER Child(verónica)   PRIMARY LANGUAGE OTHER (COMMENT)   LEARNER PREFERENCE PRIMARY PICTURES     LISTENING     VIDEOS     DEMONSTRATION   ANSWERED BY daughter   RELATIONSHIP OTHER     Depression Screening:     3 most recent PHQ Screens 2019   Little interest or pleasure in doing things Not at all   Feeling down, depressed, irritable, or hopeless Not at all   Total Score PHQ 2 0     Fall Risk Assessment:     Fall Risk Assessment, last 12 mths 2019   Able to walk? Yes   Fall in past 12 months? No     Abuse Screening:     Abuse Screening Questionnaire 2019   Do you ever feel afraid of your partner? N   Are you in a relationship with someone who physically or mentally threatens you? N   Is it safe for you to go home? Y     Coordination of Care Questionaire:   1. Have you been to the ER, urgent care clinic since your last visit? Hospitalized since your last visit? NO    2. Have you seen or consulted any other health care providers outside of the [address] [place] [address] since your last visit? Include any pap smears or colon screening. NO    Advanced Directive:   1. Do you have an Advanced Directive? NO    2. Would you like information on Advanced Directives?  NO

## 2020-03-04 NOTE — PROGRESS NOTES
[facility]     Chief Complaint   Patient presents with    Diabetes    Hypertension     Vitals:    03/04/20 1138 03/04/20 1212   BP: (!) 170/96 140/80   Pulse: 94    Resp: 16    Temp: 98.5 °F (36.9 °C)    TempSrc: Oral    SpO2: 99%    Weight: 170 lb (77.1 kg)    Height: 5' 1.5\" (1.562 m)    PainSc:   6    PainLoc: Leg          HPI:Martin Timmons is here for  Follow up on hypertension and diabetes as well as high cholesterol    She is accompanied today by her daughter and her ,she has not followed up since July of last year I have discussed with patient and her family that is important for her to be adherent to follow-up medication and blood work at least 3 times a year. She has been complaining about right lower leg pain pain is behind the knee and down the leg no numbness no weakness no sharp pain, it is on and off no specific exacerbating or relieving factors, but she thinks it also increased by movement and walking around. It feels like an ache pain. Is mild to moderate, sometimes it prevent her from being active. Past Medical History:   Diagnosis Date    Diabetes ([address]     Hypercholesteremia     Hypertension      Past Surgical History:   Procedure Laterality Date    HX CHOLECYSTECTOMY       Social History     Tobacco Use    Smoking status: Never Smoker    Smokeless tobacco: Never Used   Substance Use Topics    Alcohol use: No       Family History   Problem Relation Age of Onset    Diabetes Mother     Hypertension Mother        Review of Systems   Constitutional: Negative for chills, fever, malaise/fatigue and weight loss. HENT: Negative for congestion, ear discharge, ear pain, hearing loss, nosebleeds, sinus pain and sore throat. Eyes: Negative for blurred vision, double vision and discharge. Respiratory: Negative for cough, hemoptysis, sputum production and shortness of breath.     Cardiovascular: Negative for chest pain, palpitations, claudication and leg swelling. Gastrointestinal: Negative for abdominal pain, constipation, diarrhea, nausea and vomiting. Genitourinary: Negative for dysuria, frequency and urgency. Musculoskeletal: Negative for myalgias. Skin: Negative for itching and rash. Neurological: Negative for dizziness, tingling, sensory change, speech change, focal weakness, weakness and headaches. Psychiatric/Behavioral: Negative for depression and suicidal ideas. Physical Exam  Vitals signs and nursing note reviewed. Constitutional:       General: She is not in acute distress. Appearance: She is well-developed. She is not diaphoretic. HENT:      Head: Normocephalic and atraumatic. Mouth/Throat:      Pharynx: No oropharyngeal exudate. Eyes:      General: No scleral icterus. Right eye: No discharge. Left eye: No discharge. Conjunctiva/sclera: Conjunctivae normal.      Pupils: Pupils are equal, round, and reactive to light. Neck:      Thyroid: No thyromegaly. Cardiovascular:      Rate and Rhythm: Normal rate and regular rhythm. Heart sounds: Normal heart sounds. No murmur. Pulmonary:      Effort: Pulmonary effort is normal. No respiratory distress. Breath sounds: Normal breath sounds. No wheezing or rales. Chest:      Chest wall: No tenderness. Abdominal:      General: There is no distension. Palpations: Abdomen is soft. Tenderness: There is no abdominal tenderness. There is no rebound. Musculoskeletal: Normal range of motion. General: No tenderness or deformity. Comments: ,There is a small Baker's cyst behind the right knee right knee is positive for crepitus otherwise no effusion no numbness no weakness   Lymphadenopathy:      Cervical: No cervical adenopathy. Skin:     General: Skin is warm and dry. Coloration: Skin is not pale. Findings: No erythema or rash.    Neurological:      Mental Status: She is alert and oriented to person, place, and time.      Cranial Nerves: No cranial nerve deficit. Coordination: Coordination normal.   Psychiatric:         Behavior: Behavior normal.         Thought Content: Thought content normal.         Judgment: Judgment normal.          Assessment and plan     Plan of care has been discussed with the patient, he agrees to the plan and verbalized understanding. All his questions were answered  More than 50% of the time spent in this visit was counseling the patient about  illness and treatment options         1. Essential hypertension  Blood pressure is elevated today because patient did not take her medication prior to coming here  - CBC WITH AUTOMATED DIFF; Future  - METABOLIC PANEL, COMPREHENSIVE; Future    2. Vitamin D deficiency    She need to continue vitamin D supplements  - VITAMIN D, 25 HYDROXY; Future    3. Mixed hyperlipidemia  She is adherent to statin  - CBC WITH AUTOMATED DIFF; Future  - LIPID PANEL; Future    4. Controlled type 2 diabetes mellitus with complication, without long-term current use of insulin (HCC)  Continue metformin as well as lifestyle modification  - CBC WITH AUTOMATED DIFF; Future  - HEMOGLOBIN A1C W/O EAG; Future    5. Chronic pain of right knee    Pain most likely secondary to knee pain recommend physical therapy for strengthening of lower extremity    - XR KNEE RT 3 V; Future  - REFERRAL TO PHYSICAL THERAPY    Current Outpatient Medications   Medication Sig Dispense Refill    lisinopril-hydroCHLOROthiazide (PRINZIDE, ZESTORETIC) 20-12.5 mg per tablet TAKE 1 TABLET BY MOUTH TWICE DAILY 90 Tab 0    metFORMIN (GLUCOPHAGE) 1,000 mg tablet TAKE 1 TABLET BY MOUTH TWICE DAILY WITH MEALS 180 Tab 0    ergocalciferol (ERGOCALCIFEROL) 50,000 unit capsule Take 1 Cap by mouth every seven (7) days. 12 Cap 0    aspirin delayed-release 81 mg tablet Take 81 mg by mouth daily.          Patient Active Problem List    Diagnosis Date Noted    Controlled type 2 diabetes mellitus with complication, without long-term current use of insulin ([facility] [address] 07/02/2019    DDD (degenerative disc disease), cervical 08/27/2018    Essential hypertension 08/27/2018    Mixed hyperlipidemia 08/27/2018    Osteoporosis 08/27/2018    Irritable bowel syndrome with both constipation and diarrhea 08/27/2018    Osteoarthritis of both knees 08/27/2018     Results for orders placed or performed during the hospital encounter of 07/03/19   CBC WITH AUTOMATED DIFF   Result Value Ref Range    WBC 5.6 4.6 - 13.2 K/uL    RBC 4.66 4.20 - 5.30 M/uL    HGB 12.5 12.0 - 16.0 g/dL    HCT 38.4 35.0 - 45.0 %    MCV 82.4 74.0 - 97.0 FL    MCH 26.8 24.0 - 34.0 PG    MCHC 32.6 31.0 - 37.0 g/dL    RDW 14.6 (H) 11.6 - 14.5 %    PLATELET 598 289 - 018 K/uL    MPV 12.3 (H) 9.2 - 11.8 FL    NEUTROPHILS 44 40 - 73 %    LYMPHOCYTES 46 21 - 52 %    MONOCYTES 9 3 - 10 %    EOSINOPHILS 1 0 - 5 %    BASOPHILS 0 0 - 2 %    ABS. NEUTROPHILS 2.5 1.8 - 8.0 K/UL    ABS. LYMPHOCYTES 2.5 0.9 - 3.6 K/UL    ABS. MONOCYTES 0.5 0.05 - 1.2 K/UL    ABS. EOSINOPHILS 0.1 0.0 - 0.4 K/UL    ABS. BASOPHILS 0.0 0.0 - 0.1 K/UL    DF AUTOMATED     METABOLIC PANEL, COMPREHENSIVE   Result Value Ref Range    Sodium 142 136 - 145 mmol/L    Potassium 4.1 3.5 - 5.5 mmol/L    Chloride 106 100 - 108 mmol/L    CO2 29 21 - 32 mmol/L    Anion gap 7 3.0 - 18 mmol/L    Glucose 145 (H) 74 - 99 mg/dL    BUN 21 (H) 7.0 - 18 MG/DL    Creatinine 0.55 (L) 0.6 - 1.3 MG/DL    BUN/Creatinine ratio 38 (H) 12 - 20      GFR est AA >60 >60 ml/min/1.73m2    GFR est non-AA >60 >60 ml/min/1.73m2    Calcium 9.2 8.5 - 10.1 MG/DL    Bilirubin, total 0.3 0.2 - 1.0 MG/DL    ALT (SGPT) 17 13 - 56 U/L    AST (SGOT) 17 15 - 37 U/L    Alk.  phosphatase 78 45 - 117 U/L    Protein, total 6.8 6.4 - 8.2 g/dL    Albumin 3.7 3.4 - 5.0 g/dL    Globulin 3.1 2.0 - 4.0 g/dL    A-G Ratio 1.2 0.8 - 1.7     LIPID PANEL   Result Value Ref Range    LIPID PROFILE          Cholesterol, total 219 (H) <200 MG/DL    Triglyceride 88 <150 MG/DL    HDL Cholesterol 70 (H) 40 - 60 MG/DL    LDL, calculated 131.4 (H) 0 - 100 MG/DL    VLDL, calculated 17.6 MG/DL    CHOL/HDL Ratio 3.1 0 - 5.0     HEMOGLOBIN A1C W/O EAG   Result Value Ref Range    Hemoglobin A1c 7.4 (H) 4.2 - 5.6 %   VITAMIN D, 25 HYDROXY   Result Value Ref Range    Vitamin D 25-Hydroxy 27.5 (L) 30 - 100 ng/mL   HEPATITIS C AB   Result Value Ref Range    Hepatitis C virus Ab 0.02 <0.80 Index    Hep C  virus Ab Interp. NEGATIVE  NEG      Hep C  virus Ab comment         MICROALBUMIN, UR, RAND W/ MICROALB/CREAT RATIO   Result Value Ref Range    Microalbumin,urine random 4.23 (H) 0 - 3.0 MG/DL    Creatinine, urine 122.00 30 - 125 mg/dL    Microalbumin/Creat ratio (mg/g creat) 35 (H) 0 - 30 mg/g     Hospital Outpatient Visit on 03/04/2020   Component Date Value Ref Range Status    WBC 03/04/2020 7.2  4.6 - 13.2 K/uL Final    RBC 03/04/2020 4.79  4.20 - 5.30 M/uL Final    HGB 03/04/2020 12.4  12.0 - 16.0 g/dL Final    HCT 03/04/2020 38.8  35.0 - 45.0 % Final    MCV 03/04/2020 81.0  74.0 - 97.0 FL Final    MCH 03/04/2020 25.9  24.0 - 34.0 PG Final    MCHC 03/04/2020 32.0  31.0 - 37.0 g/dL Final    RDW 03/04/2020 14.1  11.6 - 14.5 % Final    PLATELET 50/36/4573 383  135 - 420 K/uL Final    MPV 03/04/2020 11.7  9.2 - 11.8 FL Final    NEUTROPHILS 03/04/2020 40  40 - 73 % Final    LYMPHOCYTES 03/04/2020 48  21 - 52 % Final    MONOCYTES 03/04/2020 10  3 - 10 % Final    EOSINOPHILS 03/04/2020 2  0 - 5 % Final    BASOPHILS 03/04/2020 0  0 - 2 % Final    ABS. NEUTROPHILS 03/04/2020 2.9  1.8 - 8.0 K/UL Final    ABS. LYMPHOCYTES 03/04/2020 3.4  0.9 - 3.6 K/UL Final    ABS. MONOCYTES 03/04/2020 0.7  0.05 - 1.2 K/UL Final    ABS. EOSINOPHILS 03/04/2020 0.1  0.0 - 0.4 K/UL Final    ABS.  BASOPHILS 03/04/2020 0.0  0.0 - 0.1 K/UL Final    DF 03/04/2020 AUTOMATED    Final    Sodium 03/04/2020 144  136 - 145 mmol/L Final    Potassium 03/04/2020 4.4  3.5 - 5.5 mmol/L Final    Chloride 03/04/2020 108  100 - 111 mmol/L Final    CO2 03/04/2020 27  21 - 32 mmol/L Final    Anion gap 03/04/2020 9  3.0 - 18 mmol/L Final    Glucose 03/04/2020 154* 74 - 99 mg/dL Final    BUN 03/04/2020 16  7.0 - 18 MG/DL Final    Creatinine 03/04/2020 0.54* 0.6 - 1.3 MG/DL Final    BUN/Creatinine ratio 03/04/2020 30* 12 - 20   Final    GFR est AA 03/04/2020 >60  >60 ml/min/1.73m2 Final    GFR est non-AA 03/04/2020 >60  >60 ml/min/1.73m2 Final    Comment: (NOTE)  Estimated GFR is calculated using the Modification of Diet in Renal   Disease (MDRD) Study equation, reported for both  Americans   (GFRAA) and non- Americans (GFRNA), and normalized to 1.73m2   body surface area. The physician must decide which value applies to   the patient. The MDRD study equation should only be used in   individuals age 25 or older. It has not been validated for the   following: pregnant women, patients with serious comorbid conditions,   or on certain medications, or persons with extremes of body size,   muscle mass, or nutritional status.  Calcium 03/04/2020 9.5  8.5 - 10.1 MG/DL Final    Bilirubin, total 03/04/2020 0.3  0.2 - 1.0 MG/DL Final    ALT (SGPT) 03/04/2020 15  13 - 56 U/L Final    AST (SGOT) 03/04/2020 14  10 - 38 U/L Final    Alk. phosphatase 03/04/2020 98  45 - 117 U/L Final    Protein, total 03/04/2020 6.9  6.4 - 8.2 g/dL Final    Albumin 03/04/2020 3.6  3.4 - 5.0 g/dL Final    Globulin 03/04/2020 3.3  2.0 - 4.0 g/dL Final    A-G Ratio 03/04/2020 1.1  0.8 - 1.7   Final    Vitamin D 25-Hydroxy 03/04/2020 28.0* 30 - 100 ng/mL Final    Comment: (NOTE)  Deficiency               <20 ng/mL  Insufficiency          20-30 ng/mL  Sufficient             ng/mL  Possible toxicity       >100 ng/mL    The Method used is Siemens Advia Centaur currently standardized to a   Center of Disease Control and Prevention (CDC) certified reference   [facility].  Samples containing fluorescein dye can produce falsely elevated values when tested with the ADVIA Centaur Vitamin D Assay. It is recommended that results in the toxic range, >100 ng/mL, be   retested 72 hours post fluorescein exposure.  Hemoglobin A1c 03/04/2020 7.7* 4.2 - 5.6 % Final    Comment: (NOTE)  HbA1C Interpretive Ranges  <5.7              Normal  5.7 - 6.4         Consider Prediabetes  >6.5              Consider Diabetes      LIPID PROFILE 03/04/2020        Final    Cholesterol, total 03/04/2020 194  <200 MG/DL Final    Triglyceride 03/04/2020 90  <150 MG/DL Final    Comment: The drugs N-acetylcysteine (NAC) and  Metamiszole have been found to cause falsely  low results in this chemical assay. Please  be sure to submit blood samples obtained  BEFORE administration of either of these  drugs to assure correct results.  HDL Cholesterol 03/04/2020 58  40 - 60 MG/DL Final    LDL, calculated 03/04/2020 118* 0 - 100 MG/DL Final    VLDL, calculated 03/04/2020 18  MG/DL Final    CHOL/HDL Ratio 03/04/2020 3.3  0 - 5.0   Final          Follow-up and Dispositions    · Return in about 3 months (around 6/4/2020).

## 2020-03-06 RX ORDER — ATORVASTATIN CALCIUM 10 MG/1
10 TABLET, FILM COATED ORAL DAILY
Qty: 90 TAB | Refills: 1 | Status: SHIPPED | OUTPATIENT
Start: 2020-03-06 | End: 2020-07-24

## 2020-03-06 NOTE — PROGRESS NOTES
Diabetes is well controlled , HB a1c has increased from last time need to be more adherent to diabetes diet, and also to increase physical activity as tolerated    Normal hemoglobin and normal white cell count    Normal kidney and liver function    Patient LDL is 118  ,as she has diabetes and hypertension she should be on cholesterol medication   Will send Lipitor 10 mg daily

## 2020-05-12 DIAGNOSIS — M54.2 NECK PAIN: ICD-10-CM

## 2020-05-12 DIAGNOSIS — M50.30 DDD (DEGENERATIVE DISC DISEASE), CERVICAL: ICD-10-CM

## 2020-05-12 DIAGNOSIS — M47.22 OSTEOARTHRITIS OF SPINE WITH RADICULOPATHY, CERVICAL REGION: ICD-10-CM

## 2020-05-12 RX ORDER — METFORMIN HYDROCHLORIDE 1000 MG/1
TABLET ORAL
Qty: 180 TAB | Refills: 0 | Status: SHIPPED | OUTPATIENT
Start: 2020-05-12 | End: 2020-07-24 | Stop reason: SDUPTHER

## 2020-05-12 NOTE — TELEPHONE ENCOUNTER
Requested Prescriptions     Pending Prescriptions Disp Refills    metFORMIN (GLUCOPHAGE) 1,000 mg tablet [Pharmacy Med Name: metFORMIN HCl 1000 MG Oral Tablet] 180 Tab 0     Sig: TAKE 1 TABLET BY MOUTH TWICE DAILY WITH MEALS     Pt called to request a refill. No future appointments.

## 2020-07-24 ENCOUNTER — VIRTUAL VISIT (OUTPATIENT)
Dept: FAMILY MEDICINE CLINIC | Age: 70
End: 2020-07-24

## 2020-07-24 DIAGNOSIS — L30.9 DERMATITIS: ICD-10-CM

## 2020-07-24 DIAGNOSIS — E55.9 VITAMIN D DEFICIENCY: ICD-10-CM

## 2020-07-24 DIAGNOSIS — M81.0 OSTEOPOROSIS, UNSPECIFIED OSTEOPOROSIS TYPE, UNSPECIFIED PATHOLOGICAL FRACTURE PRESENCE: ICD-10-CM

## 2020-07-24 DIAGNOSIS — L21.9 SEBORRHEA: ICD-10-CM

## 2020-07-24 DIAGNOSIS — M50.30 DDD (DEGENERATIVE DISC DISEASE), CERVICAL: ICD-10-CM

## 2020-07-24 DIAGNOSIS — I10 ESSENTIAL HYPERTENSION: ICD-10-CM

## 2020-07-24 DIAGNOSIS — M54.2 NECK PAIN: ICD-10-CM

## 2020-07-24 DIAGNOSIS — L29.9 ITCHY SCALP: Primary | ICD-10-CM

## 2020-07-24 DIAGNOSIS — E78.2 MIXED HYPERLIPIDEMIA: ICD-10-CM

## 2020-07-24 DIAGNOSIS — M47.22 OSTEOARTHRITIS OF SPINE WITH RADICULOPATHY, CERVICAL REGION: ICD-10-CM

## 2020-07-24 DIAGNOSIS — E11.8 CONTROLLED TYPE 2 DIABETES MELLITUS WITH COMPLICATION, WITHOUT LONG-TERM CURRENT USE OF INSULIN (HCC): ICD-10-CM

## 2020-07-24 RX ORDER — MULTIVITAMIN
1 TABLET ORAL DAILY
COMMUNITY

## 2020-07-24 RX ORDER — TRIAMCINOLONE ACETONIDE 1 MG/ML
LOTION TOPICAL 3 TIMES DAILY
Qty: 60 ML | Refills: 0 | Status: SHIPPED | OUTPATIENT
Start: 2020-07-24 | End: 2020-12-24

## 2020-07-24 RX ORDER — GLUCOSAMINE SULFATE 1500 MG
4000 POWDER IN PACKET (EA) ORAL DAILY
COMMUNITY

## 2020-07-24 RX ORDER — KETOCONAZOLE 20 MG/ML
SHAMPOO TOPICAL
Qty: 120 ML | Refills: 1 | Status: SHIPPED | OUTPATIENT
Start: 2020-07-24 | End: 2020-12-24

## 2020-07-24 RX ORDER — LISINOPRIL AND HYDROCHLOROTHIAZIDE 12.5; 2 MG/1; MG/1
1 TABLET ORAL DAILY
Qty: 90 TAB | Refills: 0 | Status: SHIPPED | OUTPATIENT
Start: 2020-07-24 | End: 2020-12-24 | Stop reason: SDUPTHER

## 2020-07-24 RX ORDER — METFORMIN HYDROCHLORIDE 1000 MG/1
1000 TABLET ORAL 2 TIMES DAILY WITH MEALS
Qty: 180 TAB | Refills: 0 | Status: SHIPPED | OUTPATIENT
Start: 2020-07-24 | End: 2020-11-27

## 2020-07-24 RX ORDER — OMEGA-3-ACID ETHYL ESTERS 1 G/1
2 CAPSULE, LIQUID FILLED ORAL 2 TIMES DAILY
COMMUNITY

## 2020-07-24 NOTE — PROGRESS NOTES
Levy Allison is a 65 y.o. female who was seen by synchronous (real-time) audio-video technology on 7/24/2020 for Hair/Scalp Problem and Follow Up Chronic Condition      Patient is here for medication refill and follow-up on diabetes and hypertension she has been adherent to medication    Except atorvastatin 10 mg she had stopped it because it caused her abdominal pain that resolved after she stopped the medication    She also complaining about discomfort itching  And there is no dandruff no lesions no recent chemical dye. It is on and off for few weeks     Assessment & Plan:   Diagnoses and all orders for this visit:    1. Itchy scalp   Stay well hydrated      Drink 3 litre of water daily   Keep scalp moisturized       -     triamcinolone (KENALOG) 0.1 % lotion; Apply  to affected area three (3) times daily. use thin layer    2. Dermatitis  -     triamcinolone (KENALOG) 0.1 % lotion; Apply  to affected area three (3) times daily. use thin layer    3. Seborrhea  -     ketoconazole (NIZORAL) 2 % shampoo; As directed    4. Neck pain    Much improved  with physical therapy exercises    5. DDD (degenerative disc disease), cervical    6. Essential hypertension  -     METABOLIC PANEL, COMPREHENSIVE; Future  -     CBC WITH AUTOMATED DIFF; Future  -     lisinopril-hydroCHLOROthiazide (PRINZIDE, ZESTORETIC) 20-12.5 mg per tablet; Take 1 Tab by mouth daily. 7. Vitamin D deficiency  Patient is currently taking OTC 4000 units     8. Osteoporosis, unspecified osteoporosis type, unspecified pathological fracture presence  She declined Dexa scan   9. Controlled type 2 diabetes mellitus with complication, without long-term current use of insulin ([facility])  -     METABOLIC PANEL, COMPREHENSIVE; Future  -     CBC WITH AUTOMATED DIFF; Future  -     HEMOGLOBIN A1C W/O EAG; Future  -     metFORMIN (GLUCOPHAGE) 1,000 mg tablet; Take 1 Tab by mouth two (2) times daily (with meals) for 90 days.   -     lisinopril-hydroCHLOROthiazide (PRINZIDE, ZESTORETIC) 20-12.5 mg per tablet; Take 1 Tab by mouth daily. 10. Mixed hyperlipidemia    She stopped it few month ago   -     METABOLIC PANEL, COMPREHENSIVE; Future  -     CBC WITH AUTOMATED DIFF; Future    11. Osteoarthritis of spine with radiculopathy, cervical region  -             Subjective:       Prior to Admission medications    Medication Sig Start Date End Date Taking? Authorizing Provider   triamcinolone (KENALOG) 0.1 % lotion Apply  to affected area three (3) times daily. use thin layer 7/24/20  Yes Yamilka Mims MD   ketoconazole (NIZORAL) 2 % shampoo As directed 7/24/20  Yes Yamilka Mims MD   metFORMIN (GLUCOPHAGE) 1,000 mg tablet Take 1 Tab by mouth two (2) times daily (with meals) for 90 days. 7/24/20 10/22/20 Yes Yamilka Mims MD   lisinopril-hydroCHLOROthiazide (PRINZIDE, ZESTORETIC) 20-12.5 mg per tablet Take 1 Tab by mouth daily. 7/24/20  Yes Yamilka Mims MD   aspirin delayed-release 81 mg tablet Take 81 mg by mouth daily. Yes Provider, Historical   metFORMIN (GLUCOPHAGE) 1,000 mg tablet TAKE 1 TABLET BY MOUTH TWICE DAILY WITH MEALS 5/12/20 7/24/20  Yamilka Mims MD   atorvastatin (LIPITOR) 10 mg tablet Take 1 Tab by mouth daily. 3/6/20 7/24/20  Yamilka Mims MD   lisinopril-hydroCHLOROthiazide (PRINZIDE, ZESTORETIC) 20-12.5 mg per tablet TAKE 1 TABLET BY MOUTH TWICE DAILY 2/10/20 7/24/20  Yamilka Mims MD   ergocalciferol (ERGOCALCIFEROL) 50,000 unit capsule Take 1 Cap by mouth every seven (7) days.  7/6/19   Yamilka Mims MD     Patient Active Problem List   Diagnosis Code    DDD (degenerative disc disease), cervical M50.30    Essential hypertension I10    Mixed hyperlipidemia E78.2    Osteoporosis M81.0    Irritable bowel syndrome with both constipation and diarrhea K58.2    Osteoarthritis of both knees M17.0    Controlled type 2 diabetes mellitus with complication, without long-term current use of insulin ([facility] [address] E11.8     Patient Active Problem List    Diagnosis Date Noted    Controlled type 2 diabetes mellitus with complication, without long-term [facility] use [facility] 07/02/2019    DDD (degenerative disc disease), cervical 08/27/2018    Essential hypertension 08/27/2018    Mixed hyperlipidemia 08/27/2018    Osteoporosis 08/27/2018    Irritable bowel syndrome with both constipation and diarrhea 08/27/2018    Osteoarthritis of both knees 08/27/2018     Current Outpatient Medications   Medication Sig Dispense Refill    triamcinolone (KENALOG) 0.1 % lotion Apply  to affected area three (3) times daily. use thin layer 60 mL 0    ketoconazole (NIZORAL) 2 % shampoo As directed 120 mL 1    metFORMIN (GLUCOPHAGE) 1,000 mg tablet Take 1 Tab by mouth two (2) times daily (with meals) for 90 days. 180 Tab 0    lisinopril-hydroCHLOROthiazide (PRINZIDE, ZESTORETIC) 20-12.5 mg per tablet Take 1 Tab by mouth daily. 90 Tab 0    aspirin delayed-release 81 mg tablet Take 81 mg by mouth daily.  ergocalciferol (ERGOCALCIFEROL) 50,000 unit capsule Take 1 Cap by mouth every seven (7) days. 12 Cap 0     No Known Allergies  Past Medical History:   Diagnosis Date    Diabetes [address] [address]     Hypercholesteremia     Hypertension      Past Surgical History:   Procedure Laterality Date    HX CHOLECYSTECTOMY       Family History   Problem Relation Age of Onset    Diabetes Mother     Hypertension Mother      Social History     Tobacco Use    Smoking status: Never Smoker    Smokeless tobacco: Never Used   Substance Use Topics    Alcohol use: No       Review of Systems   Constitutional: Negative for chills, fever, malaise/fatigue and weight loss. HENT: Negative for congestion, ear discharge, ear pain, hearing loss, nosebleeds, sinus pain and sore throat. Eyes: Negative for blurred vision, double vision and discharge. Respiratory: Negative for cough, hemoptysis, sputum production, shortness of breath and wheezing.     Cardiovascular: Negative for chest pain, palpitations, claudication and leg swelling. Gastrointestinal: Negative for abdominal pain, constipation, diarrhea, nausea and vomiting. Genitourinary: Negative for dysuria, frequency and urgency. Musculoskeletal: Negative for back pain, joint pain, myalgias and neck pain. Skin: Negative for itching and rash. Neurological: Negative for dizziness, tingling, sensory change, speech change, focal weakness, weakness and headaches. Psychiatric/Behavioral: Negative for depression and suicidal ideas. The patient has insomnia. Objective:   No flowsheet data found. General: alert, cooperative, no distress   Mental  status: normal mood, behavior, speech, dress, motor activity, and thought processes, able to follow commands   HENT: NCAT   Neck: no visualized mass   Resp: no respiratory distress   Neuro: no gross deficits   Skin: no discoloration or lesions of concern on visible areas   Psychiatric: normal affect, consistent with stated mood, no evidence of hallucinations     Additional exam findings: We discussed the expected course, resolution and complications of the diagnosis(es) in detail. Medication risks, benefits, costs, interactions, and alternatives were discussed as indicated. I advised her to contact the office if her condition worsens, changes or fails to improve as anticipated. She expressed understanding with the diagnosis(es) and plan. Judy Moran, who was evaluated through a patient-initiated, synchronous (real-time) audio-video encounter, and/or her healthcare decision maker, is aware that it is a billable service, with coverage as determined by her insurance carrier. She provided verbal consent to proceed: Yes, and patient identification was verified.  It was conducted pursuant to the emergency declaration under the [facility], [address] [place] [address] authority and the [employer] and [facility] Act. A caregiver was present when appropriate. Ability to conduct physical exam was limited. I was in the office. The patient was at home.       Erica Scott MD

## 2020-11-27 DIAGNOSIS — E78.2 MIXED HYPERLIPIDEMIA: ICD-10-CM

## 2020-11-27 DIAGNOSIS — R79.89 ABNORMAL TSH: ICD-10-CM

## 2020-11-27 DIAGNOSIS — I10 ESSENTIAL HYPERTENSION: Primary | ICD-10-CM

## 2020-11-27 DIAGNOSIS — E11.8 CONTROLLED TYPE 2 DIABETES MELLITUS WITH COMPLICATION, WITHOUT LONG-TERM CURRENT USE OF INSULIN (HCC): ICD-10-CM

## 2020-11-27 DIAGNOSIS — M47.22 OSTEOARTHRITIS OF SPINE WITH RADICULOPATHY, CERVICAL REGION: ICD-10-CM

## 2020-11-27 DIAGNOSIS — E55.9 VITAMIN D DEFICIENCY: ICD-10-CM

## 2020-11-27 RX ORDER — METFORMIN HYDROCHLORIDE 1000 MG/1
TABLET ORAL
Qty: 180 TAB | Refills: 0 | Status: SHIPPED | OUTPATIENT
Start: 2020-11-27 | End: 2020-12-24 | Stop reason: SDUPTHER

## 2020-12-10 ENCOUNTER — HOSPITAL ENCOUNTER (OUTPATIENT)
Dept: LAB | Age: 70
Discharge: HOME | End: 2020-12-10
Payer: MEDICAID

## 2020-12-10 ENCOUNTER — APPOINTMENT (OUTPATIENT)
Dept: FAMILY MEDICINE CLINIC | Age: 70
End: 2020-12-10

## 2020-12-10 DIAGNOSIS — E78.2 MIXED HYPERLIPIDEMIA: ICD-10-CM

## 2020-12-10 DIAGNOSIS — E11.8 CONTROLLED TYPE 2 DIABETES MELLITUS WITH COMPLICATION, WITHOUT LONG-TERM CURRENT USE OF INSULIN (HCC): ICD-10-CM

## 2020-12-10 DIAGNOSIS — I10 ESSENTIAL HYPERTENSION: ICD-10-CM

## 2020-12-10 LAB
ALBUMIN SERPL-MCNC: 3.9 G/DL (ref 3.4–5)
ALBUMIN/GLOB SERPL: 1.2 {RATIO} (ref 0.8–1.7)
ALP SERPL-CCNC: 83 U/L (ref 45–117)
ALT SERPL-CCNC: 18 U/L (ref 13–56)
ANION GAP SERPL CALC-SCNC: 6 MMOL/L (ref 3–18)
AST SERPL-CCNC: 17 U/L (ref 10–38)
BASOPHILS # BLD: 0 K/UL (ref 0–0.1)
BASOPHILS NFR BLD: 0 % (ref 0–2)
BILIRUB SERPL-MCNC: 0.4 MG/DL (ref 0.2–1)
BUN SERPL-MCNC: 19 MG/DL (ref 7–18)
BUN/CREAT SERPL: 34 (ref 12–20)
CALCIUM SERPL-MCNC: 9.7 MG/DL (ref 8.5–10.1)
CHLORIDE SERPL-SCNC: 109 MMOL/L (ref 100–111)
CHOLEST SERPL-MCNC: 214 MG/DL
CO2 SERPL-SCNC: 29 MMOL/L (ref 21–32)
CREAT SERPL-MCNC: 0.56 MG/DL (ref 0.6–1.3)
DIFFERENTIAL METHOD BLD: ABNORMAL
EOSINOPHIL # BLD: 0.1 K/UL (ref 0–0.4)
EOSINOPHIL NFR BLD: 2 % (ref 0–5)
ERYTHROCYTE [DISTWIDTH] IN BLOOD BY AUTOMATED COUNT: 16.5 % (ref 11.6–14.5)
GLOBULIN SER CALC-MCNC: 3.2 G/DL (ref 2–4)
GLUCOSE SERPL-MCNC: 140 MG/DL (ref 74–99)
HBA1C MFR BLD: 6.7 % (ref 4.2–5.6)
HCT VFR BLD AUTO: 39.9 % (ref 35–45)
HDLC SERPL-MCNC: 77 MG/DL (ref 40–60)
HDLC SERPL: 2.8 {RATIO} (ref 0–5)
HGB BLD-MCNC: 12.6 G/DL (ref 12–16)
LDLC SERPL CALC-MCNC: 119 MG/DL (ref 0–100)
LIPID PROFILE,FLP: ABNORMAL
LYMPHOCYTES # BLD: 3.2 K/UL (ref 0.9–3.6)
LYMPHOCYTES NFR BLD: 49 % (ref 21–52)
MCH RBC QN AUTO: 26.3 PG (ref 24–34)
MCHC RBC AUTO-ENTMCNC: 31.6 G/DL (ref 31–37)
MCV RBC AUTO: 83.1 FL (ref 74–97)
MONOCYTES # BLD: 0.7 K/UL (ref 0.05–1.2)
MONOCYTES NFR BLD: 10 % (ref 3–10)
NEUTS SEG # BLD: 2.6 K/UL (ref 1.8–8)
NEUTS SEG NFR BLD: 39 % (ref 40–73)
PLATELET # BLD AUTO: 207 K/UL (ref 135–420)
PMV BLD AUTO: 12 FL (ref 9.2–11.8)
POTASSIUM SERPL-SCNC: 4.2 MMOL/L (ref 3.5–5.5)
PROT SERPL-MCNC: 7.1 G/DL (ref 6.4–8.2)
RBC # BLD AUTO: 4.8 M/UL (ref 4.2–5.3)
SODIUM SERPL-SCNC: 144 MMOL/L (ref 136–145)
TRIGL SERPL-MCNC: 90 MG/DL (ref ?–150)
VLDLC SERPL CALC-MCNC: 18 MG/DL
WBC # BLD AUTO: 6.7 K/UL (ref 4.6–13.2)

## 2020-12-10 PROCEDURE — 85025 COMPLETE CBC W/AUTO DIFF WBC: CPT

## 2020-12-10 PROCEDURE — 80053 COMPREHEN METABOLIC PANEL: CPT

## 2020-12-10 PROCEDURE — 83036 HEMOGLOBIN GLYCOSYLATED A1C: CPT

## 2020-12-10 PROCEDURE — 80061 LIPID PANEL: CPT

## 2020-12-10 PROCEDURE — 36415 COLL VENOUS BLD VENIPUNCTURE: CPT

## 2020-12-24 ENCOUNTER — VIRTUAL VISIT (OUTPATIENT)
Dept: FAMILY MEDICINE CLINIC | Age: 70
End: 2020-12-24
Payer: MEDICAID

## 2020-12-24 DIAGNOSIS — Z53.20 COLON CANCER SCREENING DECLINED: ICD-10-CM

## 2020-12-24 DIAGNOSIS — Z78.0 MENOPAUSE: ICD-10-CM

## 2020-12-24 DIAGNOSIS — L81.1 MELASMA: ICD-10-CM

## 2020-12-24 DIAGNOSIS — Z28.21 23-POLYVALENT PNEUMOCOCCAL POLYSACCHARIDE VACCINE DECLINED: ICD-10-CM

## 2020-12-24 DIAGNOSIS — Z78.9 STATIN INTOLERANCE: ICD-10-CM

## 2020-12-24 DIAGNOSIS — E11.8 CONTROLLED TYPE 2 DIABETES MELLITUS WITH COMPLICATION, WITHOUT LONG-TERM CURRENT USE OF INSULIN (HCC): Primary | ICD-10-CM

## 2020-12-24 DIAGNOSIS — I10 ESSENTIAL HYPERTENSION: ICD-10-CM

## 2020-12-24 DIAGNOSIS — Z53.20 MAMMOGRAM DECLINED: ICD-10-CM

## 2020-12-24 PROBLEM — M17.11 PRIMARY OSTEOARTHRITIS OF RIGHT KNEE: Status: ACTIVE | Noted: 2017-01-13

## 2020-12-24 PROCEDURE — 99213 OFFICE O/P EST LOW 20 MIN: CPT | Performed by: LEGAL MEDICINE

## 2020-12-24 RX ORDER — METFORMIN HYDROCHLORIDE 1000 MG/1
1000 TABLET ORAL 2 TIMES DAILY WITH MEALS
Qty: 180 TAB | Refills: 1 | Status: SHIPPED | OUTPATIENT
Start: 2020-12-24 | End: 2021-09-15

## 2020-12-24 RX ORDER — TRETINOIN 0.25 MG/G
CREAM TOPICAL
Qty: 45 G | Refills: 0 | Status: SHIPPED | OUTPATIENT
Start: 2020-12-24

## 2020-12-24 RX ORDER — LISINOPRIL AND HYDROCHLOROTHIAZIDE 12.5; 2 MG/1; MG/1
1 TABLET ORAL DAILY
Qty: 90 TAB | Refills: 1 | Status: SHIPPED | OUTPATIENT
Start: 2020-12-24 | End: 2021-05-08

## 2020-12-24 NOTE — PROGRESS NOTES
Skye Galvez is a 65 y.o. female, evaluated via audio-only technology on 12/24/2020 for Medication Refill and Follow Up Chronic Condition  . Patent is here for follow up and medications refill ,shehas been stable with no acute changes ,she does try  exercise and walking daily    She is adherent to all medications. She could not tolerate statins. She has declined mammogram ,colon cancer screening   She has been having increase facial pigmentations     Lab results discussed with patients     Assessment & Plan:   Diagnoses and all orders for this visit:    1. Controlled type 2 diabetes mellitus with complication, without long-term current use of insulin (HCC)  -     metFORMIN (GLUCOPHAGE) 1,000 mg tablet; Take 1 Tab by mouth two (2) times daily (with meals) for 90 days. -     lisinopril-hydroCHLOROthiazide (PRINZIDE, ZESTORETIC) 20-12.5 mg per tablet; Take 1 Tab by mouth daily for 90 days. 2. Melasma  -     tretinoin (RETIN-A) 0.025 % topical cream; Use  At night daily    3. Essential hypertension  -     lisinopril-hydroCHLOROthiazide (PRINZIDE, ZESTORETIC) 20-12.5 mg per tablet; Take 1 Tab by mouth daily for 90 days. 4. 23-polyvalent pneumococcal polysaccharide vaccine declined    5. Colon cancer screening declined    6. Mammogram declined    7. Statin intolerance    8. Menopause  -     DEXA BONE DENSITY STUDY AXIAL; Future        12  Subjective:       Prior to Admission medications    Medication Sig Start Date End Date Taking? Authorizing Provider   tretinoin (RETIN-A) 0.025 % topical cream Use  At night daily 12/24/20  Yes Minesh Carrizales MD   metFORMIN (GLUCOPHAGE) 1,000 mg tablet Take 1 Tab by mouth two (2) times daily (with meals) for 90 days. 12/24/20 3/24/21 Yes Minesh Carrizales MD   lisinopril-hydroCHLOROthiazide (PRINZIDE, ZESTORETIC) 20-12.5 mg per tablet Take 1 Tab by mouth daily for 90 days.  12/24/20 3/24/21 Yes Minesh Carrizales MD   calcium-cholecalciferol, D3, (CALTRATE 600+D) tablet Take 1 Tab by mouth daily. Yes Provider, Historical   omega-3 acid ethyl esters (LOVAZA) 1 gram capsule Take 2 g by mouth two (2) times a day. Yes Provider, Historical   cholecalciferol (Vitamin D3) 25 mcg (1,000 unit) cap Take 4,000 Units by mouth daily. Yes Provider, Historical   aspirin delayed-release 81 mg tablet Take 81 mg by mouth daily. Yes Provider, Historical     Patient Active Problem List   Diagnosis Code    DDD (degenerative disc disease), cervical M50.30    Essential hypertension I10    Mixed hyperlipidemia E78.2    Osteoporosis M81.0    Irritable bowel syndrome with both constipation and diarrhea K58.2    Osteoarthritis of both knees M17.0    Controlled type 2 diabetes mellitus with complication, without long-term current use of insulin ([facility]) E11.8    Primary osteoarthritis of right knee M17.11     Patient Active Problem List    Diagnosis Date Noted    Controlled type 2 diabetes mellitus with complication, without long[facility]term [facility] 07/02/2019    DDD (degenerative disc disease), cervical 08/27/2018    Essential hypertension 08/27/2018    Mixed hyperlipidemia 08/27/2018    Osteoporosis 08/27/2018    Irritable bowel syndrome with both constipation and diarrhea 08/27/2018    Osteoarthritis of both knees 08/27/2018    Primary osteoarthritis of right knee 01/13/2017     Current Outpatient Medications   Medication Sig Dispense Refill    tretinoin (RETIN-A) 0.025 % topical cream Use  At night daily 45 g 0    metFORMIN (GLUCOPHAGE) 1,000 mg tablet Take 1 Tab by mouth two (2) times daily (with meals) for 90 days. 180 Tab 1    lisinopril-hydroCHLOROthiazide (PRINZIDE, ZESTORETIC) 20-12.5 mg per tablet Take 1 Tab by mouth daily for 90 days. 90 Tab 1    calcium-cholecalciferol, D3, (CALTRATE 600+D) tablet Take 1 Tab by mouth daily.  omega-3 acid ethyl esters (LOVAZA) 1 gram capsule Take 2 g by mouth two (2) times a day.       cholecalciferol (Vitamin D3) 25 mcg (1,000 unit) cap Take 4,000 Units by mouth daily.  aspirin delayed-release 81 mg tablet Take 81 mg by mouth daily. No Known Allergies  Past Medical History:   Diagnosis Date    Diabetes ([address]     Hypercholesteremia     Hypertension      Past Surgical History:   Procedure Laterality Date    HX CHOLECYSTECTOMY       Family History   Problem Relation Age of Onset    Diabetes Mother     Hypertension Mother      Social History     Tobacco Use    Smoking status: Never Smoker    Smokeless tobacco: Never Used   Substance Use Topics    Alcohol use: No       Review of Systems   Constitutional: Negative for chills, fever, malaise/fatigue and weight loss. HENT: Negative for congestion, ear discharge, ear pain, hearing loss, nosebleeds, sinus pain and sore throat. Eyes: Negative for blurred vision, double vision and discharge. Respiratory: Negative for cough, hemoptysis, sputum production, shortness of breath, wheezing and stridor. Cardiovascular: Negative for chest pain, palpitations, claudication and leg swelling. Gastrointestinal: Negative for abdominal pain, blood in stool, constipation, diarrhea, heartburn, melena, nausea and vomiting. Genitourinary: Negative for dysuria, flank pain, frequency, hematuria and urgency. Musculoskeletal: Positive for joint pain. Negative for back pain, falls, myalgias and neck pain. Skin: Positive for rash. Negative for itching. Neurological: Negative for dizziness, tingling, sensory change, speech change, focal weakness, seizures, loss of consciousness, weakness and headaches. Psychiatric/Behavioral: Negative for depression, hallucinations, memory loss, substance abuse and suicidal ideas. The patient has insomnia. The patient is not nervous/anxious. No flowsheet data found.      Black Aparicio, who was evaluated through a patient-initiated, synchronous (real-time) audio only encounter, and/or her healthcare decision maker, is aware that it is a billable service, with coverage as determined by her insurance carrier. She provided verbal consent to proceed: Yes. She has not had a related appointment within my department in the past 7 days or scheduled within the next 24 hours.       Total Time: minutes: 11-20 minutes    Valli Boas, MD

## 2020-12-29 ENCOUNTER — TELEPHONE (OUTPATIENT)
Dept: FAMILY MEDICINE CLINIC | Age: 70
End: 2020-12-29

## 2020-12-29 NOTE — TELEPHONE ENCOUNTER
[facility] CUAUHTEMOC FRANCES (Key: [id number]) - [id number]  Tretinoin 0.025% cream  Outcome: N/A  Created: December 24th, 2020 [id number]  Sent: December 29th, 2020  Open  Archive  Waiting on Approval

## 2021-02-08 ENCOUNTER — TELEPHONE (OUTPATIENT)
Dept: FAMILY MEDICINE CLINIC | Age: 71
End: 2021-02-08

## 2021-05-08 DIAGNOSIS — I10 ESSENTIAL HYPERTENSION: ICD-10-CM

## 2021-05-08 DIAGNOSIS — E11.8 CONTROLLED TYPE 2 DIABETES MELLITUS WITH COMPLICATION, WITHOUT LONG-TERM CURRENT USE OF INSULIN (HCC): ICD-10-CM

## 2021-05-08 RX ORDER — LISINOPRIL AND HYDROCHLOROTHIAZIDE 12.5; 2 MG/1; MG/1
TABLET ORAL
Qty: 90 TAB | Refills: 0 | Status: SHIPPED | OUTPATIENT
Start: 2021-05-08 | End: 2021-10-18

## 2021-05-18 ENCOUNTER — TELEPHONE (OUTPATIENT)
Dept: FAMILY MEDICINE CLINIC | Age: 71
End: 2021-05-18

## 2021-05-18 ENCOUNTER — APPOINTMENT (OUTPATIENT)
Dept: FAMILY MEDICINE CLINIC | Age: 71
End: 2021-05-18

## 2021-05-18 ENCOUNTER — HOSPITAL ENCOUNTER (OUTPATIENT)
Dept: LAB | Age: 71
Discharge: HOME | End: 2021-05-18
Payer: MEDICAID

## 2021-05-18 DIAGNOSIS — E55.9 VITAMIN D DEFICIENCY: Primary | ICD-10-CM

## 2021-05-18 DIAGNOSIS — E55.9 VITAMIN D DEFICIENCY: ICD-10-CM

## 2021-05-18 DIAGNOSIS — E11.8 CONTROLLED TYPE 2 DIABETES MELLITUS WITH COMPLICATION, WITHOUT LONG-TERM CURRENT USE OF INSULIN (HCC): ICD-10-CM

## 2021-05-18 DIAGNOSIS — R79.89 ABNORMAL TSH: ICD-10-CM

## 2021-05-18 DIAGNOSIS — I10 ESSENTIAL HYPERTENSION: ICD-10-CM

## 2021-05-18 DIAGNOSIS — E78.2 MIXED HYPERLIPIDEMIA: ICD-10-CM

## 2021-05-18 LAB — XX-LABCORP SPECIMEN COL,LCBCF: NORMAL

## 2021-05-18 PROCEDURE — 99001 SPECIMEN HANDLING PT-LAB: CPT

## 2021-05-19 LAB
25(OH)D3+25(OH)D2 SERPL-MCNC: 57.9 NG/ML (ref 30–100)
ALBUMIN SERPL-MCNC: 4.1 G/DL (ref 3.7–4.7)
ALBUMIN/CREAT UR: <8 MG/G CREAT (ref 0–29)
ALBUMIN/GLOB SERPL: 1.7 {RATIO} (ref 1.2–2.2)
ALP SERPL-CCNC: 96 IU/L (ref 48–121)
ALT SERPL-CCNC: 14 IU/L (ref 0–32)
AST SERPL-CCNC: 20 IU/L (ref 0–40)
BASOPHILS # BLD AUTO: 0.1 X10E3/UL (ref 0–0.2)
BASOPHILS NFR BLD AUTO: 1 %
BILIRUB SERPL-MCNC: 0.2 MG/DL (ref 0–1.2)
BUN SERPL-MCNC: 16 MG/DL (ref 8–27)
BUN/CREAT SERPL: 25 (ref 12–28)
CALCIUM SERPL-MCNC: 9.7 MG/DL (ref 8.7–10.3)
CHLORIDE SERPL-SCNC: 105 MMOL/L (ref 96–106)
CHOLEST SERPL-MCNC: 212 MG/DL (ref 100–199)
CO2 SERPL-SCNC: 24 MMOL/L (ref 20–29)
CREAT SERPL-MCNC: 0.63 MG/DL (ref 0.57–1)
CREAT UR-MCNC: 37.8 MG/DL
EOSINOPHIL # BLD AUTO: 0.1 X10E3/UL (ref 0–0.4)
EOSINOPHIL NFR BLD AUTO: 2 %
ERYTHROCYTE [DISTWIDTH] IN BLOOD BY AUTOMATED COUNT: 14.1 % (ref 11.7–15.4)
GLOBULIN SER CALC-MCNC: 2.4 G/DL (ref 1.5–4.5)
GLUCOSE SERPL-MCNC: 129 MG/DL (ref 65–99)
HBA1C MFR BLD: 7 % (ref 4.8–5.6)
HCT VFR BLD AUTO: 43.2 % (ref 34–46.6)
HDLC SERPL-MCNC: 68 MG/DL
HGB BLD-MCNC: 13.8 G/DL (ref 11.1–15.9)
IMM GRANULOCYTES # BLD AUTO: 0 X10E3/UL (ref 0–0.1)
IMM GRANULOCYTES NFR BLD AUTO: 0 %
IMP & REVIEW OF LAB RESULTS: NORMAL
LDLC SERPL CALC-MCNC: 127 MG/DL (ref 0–99)
LYMPHOCYTES # BLD AUTO: 2.9 X10E3/UL (ref 0.7–3.1)
LYMPHOCYTES NFR BLD AUTO: 41 %
MCH RBC QN AUTO: 27.3 PG (ref 26.6–33)
MCHC RBC AUTO-ENTMCNC: 31.9 G/DL (ref 31.5–35.7)
MCV RBC AUTO: 85 FL (ref 79–97)
MICROALBUMIN UR-MCNC: <3 UG/ML
MONOCYTES # BLD AUTO: 0.6 X10E3/UL (ref 0.1–0.9)
MONOCYTES NFR BLD AUTO: 8 %
NEUTROPHILS # BLD AUTO: 3.5 X10E3/UL (ref 1.4–7)
NEUTROPHILS NFR BLD AUTO: 48 %
PLATELET # BLD AUTO: 185 X10E3/UL (ref 150–450)
POTASSIUM SERPL-SCNC: 4.4 MMOL/L (ref 3.5–5.2)
PROT SERPL-MCNC: 6.5 G/DL (ref 6–8.5)
RBC # BLD AUTO: 5.06 X10E6/UL (ref 3.77–5.28)
SODIUM SERPL-SCNC: 145 MMOL/L (ref 134–144)
T4 FREE SERPL-MCNC: 1.36 NG/DL (ref 0.82–1.77)
TRIGL SERPL-MCNC: 98 MG/DL (ref 0–149)
TSH SERPL DL<=0.005 MIU/L-ACNC: 0.29 UIU/ML (ref 0.45–4.5)
VLDLC SERPL CALC-MCNC: 17 MG/DL (ref 5–40)
WBC # BLD AUTO: 7.2 X10E3/UL (ref 3.4–10.8)

## 2021-05-24 ENCOUNTER — OFFICE VISIT (OUTPATIENT)
Dept: FAMILY MEDICINE CLINIC | Age: 71
End: 2021-05-24
Payer: MEDICAID

## 2021-05-24 VITALS
WEIGHT: 174.4 LBS | TEMPERATURE: 97.9 F | HEIGHT: 62 IN | DIASTOLIC BLOOD PRESSURE: 79 MMHG | RESPIRATION RATE: 16 BRPM | BODY MASS INDEX: 32.09 KG/M2 | SYSTOLIC BLOOD PRESSURE: 128 MMHG | OXYGEN SATURATION: 99 % | HEART RATE: 77 BPM

## 2021-05-24 DIAGNOSIS — E11.8 CONTROLLED TYPE 2 DIABETES MELLITUS WITH COMPLICATION, WITHOUT LONG-TERM CURRENT USE OF INSULIN (HCC): ICD-10-CM

## 2021-05-24 DIAGNOSIS — Z00.00 ANNUAL PHYSICAL EXAM: Primary | ICD-10-CM

## 2021-05-24 DIAGNOSIS — M81.0 OSTEOPOROSIS, UNSPECIFIED OSTEOPOROSIS TYPE, UNSPECIFIED PATHOLOGICAL FRACTURE PRESENCE: ICD-10-CM

## 2021-05-24 DIAGNOSIS — M79.672 PAIN OF LEFT HEEL: ICD-10-CM

## 2021-05-24 DIAGNOSIS — Z53.20 MAMMOGRAM DECLINED: ICD-10-CM

## 2021-05-24 DIAGNOSIS — M54.9 UPPER BACK PAIN: ICD-10-CM

## 2021-05-24 DIAGNOSIS — I10 ESSENTIAL HYPERTENSION: ICD-10-CM

## 2021-05-24 DIAGNOSIS — M50.30 DDD (DEGENERATIVE DISC DISEASE), CERVICAL: ICD-10-CM

## 2021-05-24 DIAGNOSIS — Z53.20 COLON CANCER SCREENING DECLINED: ICD-10-CM

## 2021-05-24 DIAGNOSIS — R22.43 LOCALIZED SWELLING OF BOTH LOWER LEGS: ICD-10-CM

## 2021-05-24 PROCEDURE — 99397 PER PM REEVAL EST PAT 65+ YR: CPT | Performed by: LEGAL MEDICINE

## 2021-05-24 PROCEDURE — 3051F HG A1C>EQUAL 7.0%<8.0%: CPT | Performed by: LEGAL MEDICINE

## 2021-05-24 RX ORDER — IBANDRONATE SODIUM 150 MG/1
150 TABLET, FILM COATED ORAL
Qty: 3 TABLET | Refills: 3 | Status: SHIPPED | OUTPATIENT
Start: 2021-05-24 | End: 2021-08-22

## 2021-05-24 RX ORDER — ROSUVASTATIN CALCIUM 10 MG/1
10 TABLET, COATED ORAL
Qty: 90 TABLET | Refills: 1 | Status: SHIPPED | OUTPATIENT
Start: 2021-05-24 | End: 2021-08-22

## 2021-05-24 RX ORDER — CELECOXIB 200 MG/1
200 CAPSULE ORAL 2 TIMES DAILY
Qty: 60 CAPSULE | Refills: 1 | Status: SHIPPED | OUTPATIENT
Start: 2021-05-24 | End: 2021-08-22

## 2021-05-24 RX ORDER — FUROSEMIDE 20 MG/1
20 TABLET ORAL DAILY
Qty: 10 TABLET | Refills: 0 | Status: SHIPPED | OUTPATIENT
Start: 2021-05-24 | End: 2021-06-03

## 2021-05-24 NOTE — PROGRESS NOTES
[facility]     Chief Complaint   Patient presents with    Complete Physical     Vitals:    05/24/21 1152   BP: 128/79   Pulse: 77   Resp: 16   Temp: 97.9 °F (36.6 °C)   TempSrc: Temporal   SpO2: 99%   Weight: 174 lb 6.4 oz (79.1 kg)   Height: 5' 1.5\" (1.562 m)         HPI:Joao is here for follow up and annual physical she has been accompanied by her daughter    She has lower leg swellings for the last 2 days, no shortness of breath no chest pain    Left  heel pain  for few days as well it hurts when she is walking on it, she thinks this might be due to changing her shoes that she wears at home, location of the pain in the heel possible plantar fasciitis    She had blood work done result has been discussed with her and her daughter diabetes well controlled hemoglobin A1c 7    Today blood pressures well controlled    Needed patient need to be started on a statin        Past Medical History:   Diagnosis Date    Diabetes ([address]     Hypercholesteremia     Hypertension      Past Surgical History:   Procedure Laterality Date    HX CHOLECYSTECTOMY       Social History     Tobacco Use    Smoking status: Never Smoker    Smokeless tobacco: Never Used   Substance Use Topics    Alcohol use: No       Family History   Problem Relation Age of Onset    Diabetes Mother     Hypertension Mother        Review of Systems   Constitutional: Negative for chills, fever, malaise/fatigue and weight loss. HENT: Negative for congestion, ear discharge, ear pain, hearing loss and nosebleeds. Eyes: Negative for blurred vision, double vision and discharge. Respiratory: Negative for cough, hemoptysis, sputum production, shortness of breath and wheezing. Cardiovascular: Negative for chest pain, palpitations, claudication and leg swelling. Gastrointestinal: Negative for abdominal pain, constipation, diarrhea, nausea and vomiting. Genitourinary: Negative for dysuria, frequency and urgency.    Musculoskeletal: Positive for back pain and myalgias. Negative for joint pain and neck pain. Skin: Negative for itching and rash. Neurological: Negative for dizziness, tingling, sensory change, speech change, focal weakness, weakness and headaches. Psychiatric/Behavioral: Negative for depression and suicidal ideas. Physical Exam  Vitals and nursing note reviewed. Exam conducted with a chaperone present. Constitutional:       General: She is not in acute distress. Appearance: She is well-developed. She is not diaphoretic. HENT:      Head: Normocephalic and atraumatic. Eyes:      General: No scleral icterus. Right eye: No discharge. Left eye: No discharge. Neck:      Thyroid: No thyromegaly. Cardiovascular:      Rate and Rhythm: Normal rate and regular rhythm. Heart sounds: Normal heart sounds. Pulmonary:      Effort: Pulmonary effort is normal. No respiratory distress. Breath sounds: Normal breath sounds. No wheezing or rales. Chest:      Chest wall: No tenderness. Abdominal:      General: Bowel sounds are normal. There is no distension. Palpations: Abdomen is soft. Tenderness: There is no abdominal tenderness. There is no rebound. Musculoskeletal:         General: Tenderness present. No deformity. Normal range of motion. Right lower leg: Edema present. Left lower leg: Edema present. Comments: Patient has tenderness on palpation on the left heel bottom, more to the medial side. Patient has bilateral ankle swelling edema is not extending above the ankle joint    Upper back muscular tenderness at the area of both scapulas     Lymphadenopathy:      Cervical: No cervical adenopathy. Skin:     General: Skin is warm and dry. Coloration: Skin is not pale. Findings: No erythema or rash. Neurological:      General: No focal deficit present. Mental Status: She is alert and oriented to person, place, and time.       Cranial Nerves: No cranial nerve deficit. Coordination: Coordination normal.   Psychiatric:         Mood and Affect: Mood normal.         Behavior: Behavior normal.         Thought Content: Thought content normal.         Judgment: Judgment normal.          Assessment and plan     Plan of care has been discussed with the patient, he agrees to the plan and verbalized understanding. All his questions were answered  More than 50% of the time spent in this visit was counseling the patient about  illness and treatment options         1. Controlled type 2 diabetes mellitus with complication, without long-term current use of insulin (HCC)  Well-controlled on current medication hemoglobin A1c 7    2. DDD (degenerative disc disease), cervical  She is not complaining of any neck pain at this time    3. Essential hypertension  Blood pressures well controlled  - rosuvastatin (CRESTOR) 10 mg tablet; Take 1 Tablet by mouth nightly for 90 days. Dispense: 90 Tablet; Refill: 1    4. Osteoporosis, unspecified osteoporosis type, unspecified pathological fracture presence  DEXA scan showed osteoporosis  Discussed at length about vitamin D supplement and calcium supplement mix of natural and supplemental also weightbearing exercise has been discussed with patient and her daughter    She agreed to start on Boniva once a month again instruction was given to her how to take the medication on empty stomach with a big glass of water and not to lay down for 45 minutes  - ibandronate (BONIVA) 150 mg tablet; Take 150 mg by mouth every thirty (30) days for 90 days. Dispense: 3 Tablet; Refill: 3    5. Localized swelling of both lower legs  Patient has bilateral lower leg swelling and keep Lasix for few days advised to take banana or other potassium diet to avoid hypokalemia    If edema does not resolve consider doing echocardiogram  - furosemide (LASIX) 20 mg tablet; Take 1 Tablet by mouth daily for 10 days. Dispense: 10 Tablet; Refill: 0    6.  Pain of left heel  Possible heel spurs or plantar fasciitis  - REFERRAL TO PODIATRY    7. Colon cancer screening declined      8. Mammogram declined      9. Upper back pain  If no improvement will refer to physical therapy  - celecoxib (CELEBREX) 200 mg capsule; Take 1 Capsule by mouth two (2) times a day for 90 days. Dispense: 60 Capsule; Refill: 1    10. Annual physical exam      Current Outpatient Medications   Medication Sig Dispense Refill    rosuvastatin (CRESTOR) 10 mg tablet Take 1 Tablet by mouth nightly for 90 days. 90 Tablet 1    furosemide (LASIX) 20 mg tablet Take 1 Tablet by mouth daily for 10 days. 10 Tablet 0    ibandronate (BONIVA) 150 mg tablet Take 150 mg by mouth every thirty (30) days for 90 days. 3 Tablet 3    celecoxib (CELEBREX) 200 mg capsule Take 1 Capsule by mouth two (2) times a day for 90 days. 60 Capsule 1    lisinopril-hydroCHLOROthiazide (PRINZIDE, ZESTORETIC) 20-12.5 mg per tablet Take 1 tablet by mouth once daily for 90 days 90 Tab 0    tretinoin (RETIN-A) 0.025 % topical cream Use  At night daily 45 g 0    metFORMIN (GLUCOPHAGE) 1,000 mg tablet Take 1 Tab by mouth two (2) times daily (with meals) for 90 days. 180 Tab 1    calcium-cholecalciferol, D3, (CALTRATE 600+D) tablet Take 1 Tab by mouth daily.  omega-3 acid ethyl esters (LOVAZA) 1 gram capsule Take 2 g by mouth two (2) times a day.  cholecalciferol (Vitamin D3) 25 mcg (1,000 unit) cap Take 4,000 Units by mouth daily.  aspirin delayed-release 81 mg tablet Take 81 mg by mouth daily.          Patient Active Problem List    Diagnosis Date Noted    Controlled type 2 diabetes mellitus with complication, without long-term current use of insulin [facility] 07/02/2019    DDD (degenerative disc disease), cervical 08/27/2018    Essential hypertension 08/27/2018    Mixed hyperlipidemia 08/27/2018    Osteoporosis 08/27/2018    Irritable bowel syndrome with both constipation and diarrhea 08/27/2018    Osteoarthritis of both knees 08/27/2018    Primary osteoarthritis of right knee 01/13/2017     Results for orders placed or performed during the hospital encounter of 05/18/21   [facility] SPECIMEN COL   Result Value Ref Range    [facility] SPEC[facility]. Specimens collected/sent to [facility]. Please direct inquiries to ([phone]). Hospital Outpatient Visit on 05/18/2021   Component Date Value Ref Range Status    [facility]. 05/18/2021 Specimens collected/sent to [facility]. Please direct inquiries to ([phone]).     Final   Appointment on 05/18/2021   Component Date Value Ref Range Status    Creatinine, urine 05/18/2021 37.8  Not Estab. mg/dL Final    Microalbumin, urine 05/18/2021 <3.0  Not Estab. ug/mL Final    **Verified by repeat analysis**    Microalb/Creat ratio (ug/mg creat.) 05/18/2021 <8  0 - 29 mg/g creat Final    Comment:                        Normal:                0 -  29                         Moderately increased: 30 - 300                         Severely increased:       >300      TSH 05/18/2021 0.295* 0.450 - 4.500 uIU/mL Final    T4, Free 05/18/2021 1.36  0.82 - 1.77 ng/dL Final    Cholesterol, total 05/18/2021 212* 100 - 199 mg/dL Final    Triglyceride 05/18/2021 98  0 - 149 mg/dL Final    HDL Cholesterol 05/18/2021 68  >39 mg/dL Final    VLDL, calculated 05/18/2021 17  5 - 40 mg/dL Final    LDL, calculated 05/18/2021 127* 0 - 99 mg/dL Final    Hemoglobin A1c 05/18/2021 7.0* 4.8 - 5.6 % Final    Comment:          Prediabetes: 5.7 - 6.4           Diabetes: >6.4           Glycemic control for adults with diabetes: <7.0      Glucose 05/18/2021 129* 65 - 99 mg/dL Final    BUN 05/18/2021 16  8 - 27 mg/dL Final    Creatinine 05/18/2021 0.63  0.57 - 1.00 mg/dL Final    GFR est non-AA 05/18/2021 89  >59 mL/min/1.73 Final    GFR est AA 05/18/2021 103  >59 mL/min/1.73 Final    Comment: **[facility] currently reports eGFR in compliance with the current**    recommendations of the [employer] [facility]. Lyudmila Sena will    update reporting as new guidelines are published from the [employer]N    Task force.  BUN/Creatinine ratio 05/18/2021 25  12 - 28 Final    Sodium 05/18/2021 145* 134 - 144 mmol/L Final    Potassium 05/18/2021 4.4  3.5 - 5.2 mmol/L Final    Chloride 05/18/2021 105  96 - 106 mmol/L Final    CO2 05/18/2021 24  20 - 29 mmol/L Final    Calcium 05/18/2021 9.7  8.7 - 10.3 mg/dL Final    Protein, total 05/18/2021 6.5  6.0 - 8.5 g/dL Final    Albumin 05/18/2021 4.1  3.7 - 4.7 g/dL Final    GLOBULIN, TOTAL 05/18/2021 2.4  1.5 - 4.5 g/dL Final    A-G Ratio 05/18/2021 1.7  1.2 - 2.2 Final    Bilirubin, total 05/18/2021 0.2  0.0 - 1.2 mg/dL Final    Alk. phosphatase 05/18/2021 96  48 - 121 IU/L Final                  **Please note reference interval change**    AST (SGOT) 05/18/2021 20  0 - 40 IU/L Final    ALT (SGPT) 05/18/2021 14  0 - 32 IU/L Final    WBC 05/18/2021 7.2  3.4 - 10.8 x10E3/uL Final    RBC 05/18/2021 5.06  3.77 - 5.28 x10E6/uL Final    HGB 05/18/2021 13.8  11.1 - 15.9 g/dL Final    HCT 05/18/2021 43.2  34.0 - 46.6 % Final    MCV 05/18/2021 85  79 - 97 fL Final    MCH 05/18/2021 27.3  26.6 - 33.0 pg Final    MCHC 05/18/2021 31.9  31.5 - 35.7 g/dL Final    RDW 05/18/2021 14.1  11.7 - 15.4 % Final    PLATELET 34/73/3003 216  150 - 450 x10E3/uL Final    NEUTROPHILS 05/18/2021 48  Not Estab. % Final    Lymphocytes 05/18/2021 41  Not Estab. % Final    MONOCYTES 05/18/2021 8  Not Estab. % Final    EOSINOPHILS 05/18/2021 2  Not Estab. % Final    BASOPHILS 05/18/2021 1  Not Estab. % Final    ABS. NEUTROPHILS 05/18/2021 3.5  1.4 - 7.0 x10E3/uL Final    Abs Lymphocytes 05/18/2021 2.9  0.7 - 3.1 x10E3/uL Final    ABS. MONOCYTES 05/18/2021 0.6  0.1 - 0.9 x10E3/uL Final    ABS. EOSINOPHILS 05/18/2021 0.1  0.0 - 0.4 x10E3/uL Final    ABS. BASOPHILS 05/18/2021 0.1  0.0 - 0.2 x10E3/uL Final    IMMATURE GRANULOCYTES 05/18/2021 0  Not Estab. % Final    ABS. IMM. GRANS. 05/18/2021 0.0  0.0 - 0.1 x10E3/uL Final    INTERPRETATION 05/18/2021 Note   Final    Supplemental report is available.  VITAMIN D, 25-HYDROXY 05/18/2021 57.9  30.0 - 100.0 ng/mL Final    Comment: Vitamin D deficiency has been defined by the [address] practice guideline as a  level of serum 25-OH vitamin D less than 20 ng/mL (1,2). The Endocrine Society went on to further define vitamin D  insufficiency as a level between 21 and 29 ng/mL (2). 1. IOM ([place] of Medicine). 2010. Dietary reference     intakes for calcium and D. [facility]: The     [employer]. 2. Ron MF, Dave NC, Antwan HAYDEN, et al.     Evaluation, treatment, and prevention of vitamin D     deficiency: an Endocrine Society clinical practice     guideline. JCEM. 2011 Jul; 96(7):1911-30. Follow-up and Dispositions    · Return in about 3 months (around 8/24/2021).

## 2021-05-24 NOTE — PROGRESS NOTES
Maryam Hill is a 66 y.o. female (: 1950) presenting to address:    Chief Complaint   Patient presents with    Complete Physical       Vitals:    21 1152   BP: 128/79   Pulse: 77   Resp: 16   Temp: 97.9 °F (36.6 °C)   TempSrc: Temporal   SpO2: 99%   Weight: 174 lb 6.4 oz (79.1 kg)   Height: 5' 1.5\" (1.562 m)       Is someone accompanying this pt? YES inn ismail     Is the patient using any DME equipment during OV? NO    Hearing/Vision:   No exam data present    Learning Assessment:     Learning Assessment 2019   PRIMARY LEARNER Child(verónica)   PRIMARY LANGUAGE OTHER (COMMENT)   LEARNER PREFERENCE PRIMARY PICTURES     LISTENING     VIDEOS     DEMONSTRATION   ANSWERED BY daughter   RELATIONSHIP OTHER     Depression Screening:     3 most recent PHQ Screens 2021   Little interest or pleasure in doing things Not at all   Feeling down, depressed, irritable, or hopeless Not at all   Total Score PHQ 2 0     Fall Risk Assessment:     Fall Risk Assessment, last 12 mths 2021   Able to walk? Yes   Fall in past 12 months? 0   Do you feel unsteady? 0   Are you worried about falling 0     Coordination of Care Questionaire:   1. Have you been to the ER, urgent care clinic since your last visit? Hospitalized since your last visit? NO    2. Have you seen or consulted any other health care providers outside of the [address] [place] [address] since your last visit? Include any pap smears or colon screening. NO    Advanced Directive:   1. Do you have an Advanced Directive? NO    2. Would you like information on Advanced Directives?  NO

## 2021-05-25 ENCOUNTER — DOCUMENTATION ONLY (OUTPATIENT)
Dept: FAMILY MEDICINE CLINIC | Age: 71
End: 2021-05-25

## 2021-06-01 ENCOUNTER — TELEPHONE (OUTPATIENT)
Dept: FAMILY MEDICINE CLINIC | Age: 71
End: 2021-06-01

## 2021-06-01 DIAGNOSIS — M25.50 ARTHRALGIA, UNSPECIFIED JOINT: ICD-10-CM

## 2021-06-01 DIAGNOSIS — R30.0 DYSURIA: Primary | ICD-10-CM

## 2021-06-01 DIAGNOSIS — R52 GENERALIZED BODY ACHES: ICD-10-CM

## 2021-06-30 DIAGNOSIS — Z78.0 MENOPAUSE: ICD-10-CM

## 2021-09-14 DIAGNOSIS — E55.9 VITAMIN D DEFICIENCY: ICD-10-CM

## 2021-09-14 DIAGNOSIS — E11.8 CONTROLLED TYPE 2 DIABETES MELLITUS WITH COMPLICATION, WITHOUT LONG-TERM CURRENT USE OF INSULIN (HCC): ICD-10-CM

## 2021-09-14 DIAGNOSIS — I10 ESSENTIAL HYPERTENSION: Primary | ICD-10-CM

## 2021-09-14 DIAGNOSIS — E78.2 MIXED HYPERLIPIDEMIA: ICD-10-CM

## 2021-09-15 RX ORDER — METFORMIN HYDROCHLORIDE 1000 MG/1
TABLET ORAL
Qty: 180 TABLET | Refills: 0 | Status: SHIPPED | OUTPATIENT
Start: 2021-09-15 | End: 2021-11-24 | Stop reason: SDUPTHER

## 2021-09-16 NOTE — TELEPHONE ENCOUNTER
Pt scheduled    Future Appointments   Date Time Provider Salome Chakraborty   10/12/2021  9:30 AM [facility] AMB   10/19/2021  2:30 PM Tana Miner MD [facility] AMB

## 2021-10-15 DIAGNOSIS — E11.8 CONTROLLED TYPE 2 DIABETES MELLITUS WITH COMPLICATION, WITHOUT LONG-TERM CURRENT USE OF INSULIN (HCC): ICD-10-CM

## 2021-10-15 DIAGNOSIS — I10 ESSENTIAL HYPERTENSION: ICD-10-CM

## 2021-10-18 RX ORDER — LISINOPRIL AND HYDROCHLOROTHIAZIDE 12.5; 2 MG/1; MG/1
TABLET ORAL
Qty: 90 TABLET | Refills: 0 | Status: SHIPPED | OUTPATIENT
Start: 2021-10-18 | End: 2021-11-24 | Stop reason: SDUPTHER

## 2021-11-18 ENCOUNTER — APPOINTMENT (OUTPATIENT)
Dept: FAMILY MEDICINE CLINIC | Age: 71
End: 2021-11-18

## 2021-11-18 DIAGNOSIS — R52 GENERALIZED BODY ACHES: ICD-10-CM

## 2021-11-18 DIAGNOSIS — R30.0 DYSURIA: ICD-10-CM

## 2021-11-18 DIAGNOSIS — I10 ESSENTIAL HYPERTENSION: ICD-10-CM

## 2021-11-18 DIAGNOSIS — E55.9 VITAMIN D DEFICIENCY: ICD-10-CM

## 2021-11-18 DIAGNOSIS — E11.8 CONTROLLED TYPE 2 DIABETES MELLITUS WITH COMPLICATION, WITHOUT LONG-TERM CURRENT USE OF INSULIN (HCC): ICD-10-CM

## 2021-11-18 DIAGNOSIS — M25.50 ARTHRALGIA, UNSPECIFIED JOINT: ICD-10-CM

## 2021-11-18 DIAGNOSIS — E78.2 MIXED HYPERLIPIDEMIA: ICD-10-CM

## 2021-11-21 LAB
ALBUMIN SERPL-MCNC: 4.4 G/DL (ref 3.7–4.7)
ALBUMIN/GLOB SERPL: 1.9 {RATIO} (ref 1.2–2.2)
ALP SERPL-CCNC: 85 IU/L (ref 44–121)
ALT SERPL-CCNC: 12 IU/L (ref 0–32)
AST SERPL-CCNC: 13 IU/L (ref 0–40)
BACTERIA UR CULT: NORMAL
BASOPHILS # BLD AUTO: 0.1 X10E3/UL (ref 0–0.2)
BASOPHILS NFR BLD AUTO: 1 %
BILIRUB SERPL-MCNC: <0.2 MG/DL (ref 0–1.2)
BUN SERPL-MCNC: 20 MG/DL (ref 8–27)
BUN/CREAT SERPL: 36 (ref 12–28)
CALCIUM SERPL-MCNC: 9.7 MG/DL (ref 8.7–10.3)
CHLORIDE SERPL-SCNC: 107 MMOL/L (ref 96–106)
CHOLEST SERPL-MCNC: 210 MG/DL (ref 100–199)
CO2 SERPL-SCNC: 22 MMOL/L (ref 20–29)
CREAT SERPL-MCNC: 0.56 MG/DL (ref 0.57–1)
EOSINOPHIL # BLD AUTO: 0.2 X10E3/UL (ref 0–0.4)
EOSINOPHIL NFR BLD AUTO: 2 %
ERYTHROCYTE [DISTWIDTH] IN BLOOD BY AUTOMATED COUNT: 13.1 % (ref 11.7–15.4)
ERYTHROCYTE [SEDIMENTATION RATE] IN BLOOD BY WESTERGREN METHOD: 17 MM/HR (ref 0–40)
GLOBULIN SER CALC-MCNC: 2.3 G/DL (ref 1.5–4.5)
GLUCOSE SERPL-MCNC: 127 MG/DL (ref 65–99)
HBA1C MFR BLD: 7.4 % (ref 4.8–5.6)
HCT VFR BLD AUTO: 40.5 % (ref 34–46.6)
HDLC SERPL-MCNC: 56 MG/DL
HGB BLD-MCNC: 13.2 G/DL (ref 11.1–15.9)
IMM GRANULOCYTES # BLD AUTO: 0 X10E3/UL (ref 0–0.1)
IMM GRANULOCYTES NFR BLD AUTO: 0 %
IMP & REVIEW OF LAB RESULTS: NORMAL
LDLC SERPL CALC-MCNC: 128 MG/DL (ref 0–99)
LYMPHOCYTES # BLD AUTO: 3.6 X10E3/UL (ref 0.7–3.1)
LYMPHOCYTES NFR BLD AUTO: 41 %
MCH RBC QN AUTO: 27.6 PG (ref 26.6–33)
MCHC RBC AUTO-ENTMCNC: 32.6 G/DL (ref 31.5–35.7)
MCV RBC AUTO: 85 FL (ref 79–97)
MONOCYTES # BLD AUTO: 0.7 X10E3/UL (ref 0.1–0.9)
MONOCYTES NFR BLD AUTO: 8 %
NEUTROPHILS # BLD AUTO: 4.3 X10E3/UL (ref 1.4–7)
NEUTROPHILS NFR BLD AUTO: 48 %
PLATELET # BLD AUTO: 222 X10E3/UL (ref 150–450)
POTASSIUM SERPL-SCNC: 4.2 MMOL/L (ref 3.5–5.2)
PROT SERPL-MCNC: 6.7 G/DL (ref 6–8.5)
RBC # BLD AUTO: 4.78 X10E6/UL (ref 3.77–5.28)
SODIUM SERPL-SCNC: 144 MMOL/L (ref 134–144)
TRIGL SERPL-MCNC: 147 MG/DL (ref 0–149)
VLDLC SERPL CALC-MCNC: 26 MG/DL (ref 5–40)
WBC # BLD AUTO: 8.9 X10E3/UL (ref 3.4–10.8)

## 2021-11-24 ENCOUNTER — OFFICE VISIT (OUTPATIENT)
Dept: FAMILY MEDICINE CLINIC | Age: 71
End: 2021-11-24
Payer: MEDICAID

## 2021-11-24 VITALS
DIASTOLIC BLOOD PRESSURE: 83 MMHG | RESPIRATION RATE: 15 BRPM | WEIGHT: 175 LBS | HEART RATE: 97 BPM | TEMPERATURE: 97.3 F | HEIGHT: 62 IN | BODY MASS INDEX: 32.2 KG/M2 | OXYGEN SATURATION: 100 % | SYSTOLIC BLOOD PRESSURE: 136 MMHG

## 2021-11-24 DIAGNOSIS — I10 ESSENTIAL HYPERTENSION: ICD-10-CM

## 2021-11-24 DIAGNOSIS — Z12.11 COLON CANCER SCREENING: ICD-10-CM

## 2021-11-24 DIAGNOSIS — H61.23 BILATERAL IMPACTED CERUMEN: ICD-10-CM

## 2021-11-24 DIAGNOSIS — E11.8 CONTROLLED TYPE 2 DIABETES MELLITUS WITH COMPLICATION, WITHOUT LONG-TERM CURRENT USE OF INSULIN (HCC): Primary | ICD-10-CM

## 2021-11-24 DIAGNOSIS — E78.2 MIXED HYPERLIPIDEMIA: ICD-10-CM

## 2021-11-24 DIAGNOSIS — H91.93 BILATERAL HEARING LOSS, UNSPECIFIED HEARING LOSS TYPE: ICD-10-CM

## 2021-11-24 PROCEDURE — 3051F HG A1C>EQUAL 7.0%<8.0%: CPT | Performed by: LEGAL MEDICINE

## 2021-11-24 PROCEDURE — 99214 OFFICE O/P EST MOD 30 MIN: CPT | Performed by: LEGAL MEDICINE

## 2021-11-24 PROCEDURE — 69209 REMOVE IMPACTED EAR WAX UNI: CPT | Performed by: LEGAL MEDICINE

## 2021-11-24 RX ORDER — CYCLOSPORINE 0.5 MG/ML
EMULSION OPHTHALMIC
COMMUNITY
Start: 2021-11-18

## 2021-11-24 RX ORDER — LISINOPRIL AND HYDROCHLOROTHIAZIDE 12.5; 2 MG/1; MG/1
1 TABLET ORAL DAILY
Qty: 90 TABLET | Refills: 3 | Status: SHIPPED | OUTPATIENT
Start: 2021-11-24 | End: 2022-01-31

## 2021-11-24 RX ORDER — SIMVASTATIN 10 MG/1
10 TABLET, FILM COATED ORAL
Qty: 90 TABLET | Refills: 3 | Status: SHIPPED | OUTPATIENT
Start: 2021-11-24 | End: 2022-02-22

## 2021-11-24 RX ORDER — METFORMIN HYDROCHLORIDE 1000 MG/1
1000 TABLET ORAL 2 TIMES DAILY WITH MEALS
Qty: 180 TABLET | Refills: 3 | Status: SHIPPED | OUTPATIENT
Start: 2021-11-24

## 2021-11-24 NOTE — PROGRESS NOTES
Nemesio Britton is a 66 y.o. female (: 1950) presenting to address:    Chief Complaint   Patient presents with    Follow-up    Back Pain    Shoulder Pain       Vitals:    21 1529   BP: 136/83   Pulse: 97   Resp: 15   Temp: 97.3 °F (36.3 °C)   TempSrc: Temporal   SpO2: 100%   Weight: 175 lb (79.4 kg)   Height: 5' 1.5\" (1.562 m)   PainSc:   7       Hearing/Vision:   No exam data present    Learning Assessment:     Learning Assessment 2019   PRIMARY LEARNER Child(verónica)   PRIMARY LANGUAGE OTHER (COMMENT)   LEARNER PREFERENCE PRIMARY PICTURES     LISTENING     VIDEOS     DEMONSTRATION   ANSWERED BY daughter   RELATIONSHIP OTHER     Depression Screening:     3 most recent PHQ Screens 2021   Little interest or pleasure in doing things Not at all   Feeling down, depressed, irritable, or hopeless Not at all   Total Score PHQ 2 0     Fall Risk Assessment:     Fall Risk Assessment, last 12 mths 2021   Able to walk? Yes   Fall in past 12 months? 0   Do you feel unsteady? 0   Are you worried about falling 0     Abuse Screening:     Abuse Screening Questionnaire 2019   Do you ever feel afraid of your partner? N   Are you in a relationship with someone who physically or mentally threatens you? N   Is it safe for you to go home?  Y     ADL Assessment:     ADL Assessment 2019   Feeding yourself No Help Needed   Getting from bed to chair No Help Needed   Getting dressed No Help Needed   Bathing or showering No Help Needed   Walk across the room (includes cane/walker) No Help Needed   Using the telphone No Help Needed   Taking your medications No Help Needed   Preparing meals No Help Needed   Managing money (expenses/bills) No Help Needed   Moderately strenuous housework (laundry) No Help Needed   Shopping for personal items (toiletries/medicines) No Help Needed   Shopping for groceries No Help Needed   Driving No Help Needed   Climbing a flight of stairs No Help Needed   Getting to places beyond walking distances No Help Needed        Coordination of Care Questionaire:   1. \"Have you been to the ER, urgent care clinic since your last visit? Hospitalized since your last visit? \" No    2. \"Have you seen or consulted any other health care providers outside of the [address] [place] [address] since your last visit? \" No     3. For patients aged 39-70: Has the patient had a colonoscopy? No     If the patient is female:    4. For patients aged 41-77: Has the patient had a mammogram within the past 2 years? No    5. For patients aged 21-65: Has the patient had a pap smear? Yes, HM satisfied with blue hyperlink    Advanced Directive:   1. Do you have an Advanced Directive? NO    2. Would you like information on Advanced Directives? NO    PATIENT RECEIVED FLU VACCINE ELSEWHERE.

## 2021-11-24 NOTE — PROGRESS NOTES
Julian Soto     Chief Complaint   Patient presents with    Follow-up    Back Pain    Shoulder Pain     Vitals:    11/24/21 1529   BP: 136/83   Pulse: 97   Resp: 15   Temp: 97.3 °F (36.3 °C)   TempSrc: Temporal   SpO2: 100%   Weight: 175 lb (79.4 kg)   Height: 5' 1.5\" (1.562 m)   PainSc:   7         HPI: Mrs. Juarez Barros is here with her daughter Maral Sawyer for follow-up  She has been stable doing well  She gets her dizziness and vertigo occasionally      She had eye exam at  [address] [place] [address] RD  Declined  Mammogram     Agreed to get FIT test that was given to her in the office today    She has hearing loss on the right ear  And decreased hearing on left ear  As per her daughter she had hearing evaluation couple years ago    Requesting a new hearing evaluation to consider hearing aids    Past Medical History:   Diagnosis Date    Diabetes ([address]     Hypercholesteremia     Hypertension      Past Surgical History:   Procedure Laterality Date    HX CHOLECYSTECTOMY       Social History     Tobacco Use    Smoking status: Never Smoker    Smokeless tobacco: Never Used   Substance Use Topics    Alcohol use: No       Family History   Problem Relation Age of Onset    Diabetes Mother     Hypertension Mother        Review of Systems   Constitutional: Negative for chills, fever, malaise/fatigue and weight loss. HENT: Negative for congestion, ear discharge, ear pain, hearing loss, nosebleeds, sinus pain and sore throat. Eyes: Negative for blurred vision, double vision and discharge. Respiratory: Negative for cough, hemoptysis, sputum production, shortness of breath and wheezing. Cardiovascular: Negative for chest pain, palpitations, claudication and leg swelling. Gastrointestinal: Negative for abdominal pain, constipation, diarrhea, nausea and vomiting. Genitourinary: Negative for dysuria, frequency and urgency. Musculoskeletal: Negative for back pain, joint pain, myalgias and neck pain.    Skin: Negative for itching and rash. Neurological: Negative for dizziness, tingling, sensory change, speech change, focal weakness, weakness and headaches. Psychiatric/Behavioral: Negative for depression, hallucinations, substance abuse and suicidal ideas. The patient has insomnia. The patient is not nervous/anxious. Physical Exam  Vitals and nursing note reviewed. Constitutional:       General: She is not in acute distress. Appearance: She is well-developed. She is not diaphoretic. HENT:      Head: Normocephalic and atraumatic. Right Ear: There is impacted cerumen. Left Ear: There is impacted cerumen. Nose: No congestion. Mouth/Throat:      Mouth: Mucous membranes are moist.      Pharynx: No oropharyngeal exudate or posterior oropharyngeal erythema. Eyes:      General: No scleral icterus. Right eye: No discharge. Left eye: No discharge. Conjunctiva/sclera: Conjunctivae normal.      Pupils: Pupils are equal, round, and reactive to light. Neck:      Thyroid: No thyromegaly. Cardiovascular:      Rate and Rhythm: Normal rate and regular rhythm. Heart sounds: Normal heart sounds. Pulmonary:      Effort: Pulmonary effort is normal. No respiratory distress. Breath sounds: Normal breath sounds. No wheezing or rales. Chest:      Chest wall: No tenderness. Abdominal:      General: Bowel sounds are normal. There is no distension. Palpations: Abdomen is soft. Tenderness: There is no abdominal tenderness. There is no right CVA tenderness, left CVA tenderness, guarding or rebound. Hernia: No hernia is present. Musculoskeletal:         General: No tenderness or deformity. Normal range of motion. Right lower leg: No edema. Lymphadenopathy:      Cervical: No cervical adenopathy. Skin:     General: Skin is warm and dry. Coloration: Skin is not jaundiced or pale. Findings: No bruising, erythema, lesion or rash.    Neurological: General: No focal deficit present. Mental Status: She is alert and oriented to person, place, and time. Cranial Nerves: No cranial nerve deficit. Sensory: No sensory deficit. Motor: No weakness. Coordination: Coordination normal.      Gait: Gait normal.      Deep Tendon Reflexes: Reflexes normal.   Psychiatric:         Mood and Affect: Mood normal.         Behavior: Behavior normal.         Thought Content: Thought content normal.         Judgment: Judgment normal.          Assessment and plan     Plan of care has been discussed with the patient, he agrees to the plan and verbalized understanding. All his questions were answered  More than 50% of the time spent in this visit was counseling the patient about  illness and treatment options         1. Controlled type 2 diabetes mellitus with complication, without long-term current use of insulin ([facility]  She is adherent to medication hemoglobin A1c 7.4    - lisinopril-hydroCHLOROthiazide (PRINZIDE, ZESTORETIC) 20-12.5 mg per tablet; Take 1 Tablet by mouth daily for 90 days. Dispense: 90 Tablet; Refill: 3  - metFORMIN (GLUCOPHAGE) 1,000 mg tablet; Take 1 Tablet by mouth two (2) times daily (with meals). Dispense: 180 Tablet; Refill: 3    2. Essential hypertension  Blood pressures well controlled  - lisinopril-hydroCHLOROthiazide (PRINZIDE, ZESTORETIC) 20-12.5 mg per tablet; Take 1 Tablet by mouth daily for 90 days. Dispense: 90 Tablet; Refill: 3    3. Colon cancer screening      - OCCULT BLOOD IMMUNOASSAY,DIAGNOSTIC; Future    4. Mixed hyperlipidemia  She has taken rosuvastatin in the past and gave her side effects of myalgia will trial with Zocor  - simvastatin (ZOCOR) 10 mg tablet; Take 1 Tablet by mouth nightly for 90 days. Dispense: 90 Tablet; Refill: 3    5.  Bilateral impacted cerumen  [place] was done for the left ear  And patient declined the right ear  She felt slightly dizzy and decreased hearing at the moment  - REMOVAL IMPACTED CERUMEN IRRIGATION/LVG UNILAT    6. Bilateral hearing loss, unspecified hearing loss type      - REFERRAL TO AUDIOLOGY    Current Outpatient Medications   Medication Sig Dispense Refill    Restasis 0.05 % dpet       lisinopril-hydroCHLOROthiazide (PRINZIDE, ZESTORETIC) 20-12.5 mg per tablet Take 1 Tablet by mouth daily for 90 days. 90 Tablet 3    metFORMIN (GLUCOPHAGE) 1,000 mg tablet Take 1 Tablet by mouth two (2) times daily (with meals). 180 Tablet 3    simvastatin (ZOCOR) 10 mg tablet Take 1 Tablet by mouth nightly for 90 days. 90 Tablet 3    calcium-cholecalciferol, D3, (CALTRATE 600+D) tablet Take 1 Tab by mouth daily.  omega-3 acid ethyl esters (LOVAZA) 1 gram capsule Take 2 g by mouth two (2) times a day.  cholecalciferol (Vitamin D3) 25 mcg (1,000 unit) cap Take 4,000 Units by mouth daily.  aspirin delayed-release 81 mg tablet Take 81 mg by mouth daily.       tretinoin (RETIN-A) 0.025 % topical cream Use  At night daily (Patient not taking: Reported on 11/24/2021) 45 g 0       Patient Active Problem List    Diagnosis Date Noted    Controlled type 2 diabetes mellitus with complication, without long-term current use of insulin ([facility] 07/02/2019    DDD (degenerative disc disease), cervical 08/27/2018    Essential hypertension 08/27/2018    Mixed hyperlipidemia 08/27/2018    Osteoporosis 08/27/2018    Irritable bowel syndrome with both constipation and diarrhea 08/27/2018    Osteoarthritis of both knees 08/27/2018    Primary osteoarthritis of right knee 01/13/2017     Results for orders placed or performed in visit on 11/18/21   CULTURE, URINE   Result Value Ref Range    Urine Culture, Routine       Mixed urogenital lu  10,000-25,000 colony forming units per mL     HEMOGLOBIN A1C W/O EAG   Result Value Ref Range    Hemoglobin A1c 7.4 (H) 4.8 - 5.6 %   CBC WITH AUTOMATED DIFF   Result Value Ref Range    WBC 8.9 3.4 - 10.8 x10E3/uL    RBC 4.78 3.77 - 5.28 x10E6/uL    HGB 13.2 11.1 - 15.9 g/dL    HCT 40.5 34.0 - 46.6 %    MCV 85 79 - 97 fL    MCH 27.6 26.6 - 33.0 pg    MCHC 32.6 31.5 - 35.7 g/dL    RDW 13.1 11.7 - 15.4 %    PLATELET 481 090 - 953 x10E3/uL    NEUTROPHILS 48 Not Estab. %    Lymphocytes 41 Not Estab. %    MONOCYTES 8 Not Estab. %    EOSINOPHILS 2 Not Estab. %    BASOPHILS 1 Not Estab. %    ABS. NEUTROPHILS 4.3 1.4 - 7.0 x10E3/uL    Abs Lymphocytes 3.6 (H) 0.7 - 3.1 x10E3/uL    ABS. MONOCYTES 0.7 0.1 - 0.9 x10E3/uL    ABS. EOSINOPHILS 0.2 0.0 - 0.4 x10E3/uL    ABS. BASOPHILS 0.1 0.0 - 0.2 x10E3/uL    IMMATURE GRANULOCYTES 0 Not Estab. %    ABS. IMM. GRANS. 0.0 0.0 - 0.1 x10E3/uL   LIPID PANEL   Result Value Ref Range    Cholesterol, total 210 (H) 100 - 199 mg/dL    Triglyceride 147 0 - 149 mg/dL    HDL Cholesterol 56 >39 mg/dL    VLDL, calculated 26 5 - 40 mg/dL    LDL, calculated 128 (H) 0 - 99 mg/dL   METABOLIC PANEL, COMPREHENSIVE   Result Value Ref Range    Glucose 127 (H) 65 - 99 mg/dL    BUN 20 8 - 27 mg/dL    Creatinine 0.56 (L) 0.57 - 1.00 mg/dL    GFR est non-AA 93 >59 mL/min/1.73    GFR est  >59 mL/min/1.73    BUN/Creatinine ratio 36 (H) 12 - 28    Sodium 144 134 - 144 mmol/L    Potassium 4.2 3.5 - 5.2 mmol/L    Chloride 107 (H) 96 - 106 mmol/L    CO2 22 20 - 29 mmol/L    Calcium 9.7 8.7 - 10.3 mg/dL    Protein, total 6.7 6.0 - 8.5 g/dL    Albumin 4.4 3.7 - 4.7 g/dL    GLOBULIN, TOTAL 2.3 1.5 - 4.5 g/dL    A-G Ratio 1.9 1.2 - 2.2    Bilirubin, total <0.2 0.0 - 1.2 mg/dL    Alk.  phosphatase 85 44 - 121 IU/L    AST (SGOT) 13 0 - 40 IU/L    ALT (SGPT) 12 0 - 32 IU/L   SED RATE (ESR)   Result Value Ref Range    Sed rate (ESR) 17 0 - 40 mm/hr   CVD REPORT   Result Value Ref Range    INTERPRETATION Note      Appointment on 11/18/2021   Component Date Value Ref Range Status    Hemoglobin A1c 11/18/2021 7.4* 4.8 - 5.6 % Final    Comment:          Prediabetes: 5.7 - 6.4           Diabetes: >6.4           Glycemic control for adults with diabetes: <7.0      WBC 11/18/2021 8.9  3.4 - 10.8 x10E3/uL Final    RBC 11/18/2021 4.78  3.77 - 5.28 x10E6/uL Final    HGB 11/18/2021 13.2  11.1 - 15.9 g/dL Final    HCT 11/18/2021 40.5  34.0 - 46.6 % Final    MCV 11/18/2021 85  79 - 97 fL Final    MCH 11/18/2021 27.6  26.6 - 33.0 pg Final    MCHC 11/18/2021 32.6  31.5 - 35.7 g/dL Final    RDW 11/18/2021 13.1  11.7 - 15.4 % Final    PLATELET 57/29/1509 389  150 - 450 x10E3/uL Final    NEUTROPHILS 11/18/2021 48  Not Estab. % Final    Lymphocytes 11/18/2021 41  Not Estab. % Final    MONOCYTES 11/18/2021 8  Not Estab. % Final    EOSINOPHILS 11/18/2021 2  Not Estab. % Final    BASOPHILS 11/18/2021 1  Not Estab. % Final    ABS. NEUTROPHILS 11/18/2021 4.3  1.4 - 7.0 x10E3/uL Final    Abs Lymphocytes 11/18/2021 3.6* 0.7 - 3.1 x10E3/uL Final    ABS. MONOCYTES 11/18/2021 0.7  0.1 - 0.9 x10E3/uL Final    ABS. EOSINOPHILS 11/18/2021 0.2  0.0 - 0.4 x10E3/uL Final    ABS. BASOPHILS 11/18/2021 0.1  0.0 - 0.2 x10E3/uL Final    IMMATURE GRANULOCYTES 11/18/2021 0  Not Estab. % Final    ABS. IMM. GRANS. 11/18/2021 0.0  0.0 - 0.1 x10E3/uL Final    Cholesterol, total 11/18/2021 210* 100 - 199 mg/dL Final    Triglyceride 11/18/2021 147  0 - 149 mg/dL Final    HDL Cholesterol 11/18/2021 56  >39 mg/dL Final    VLDL, calculated 11/18/2021 26  5 - 40 mg/dL Final    LDL, calculated 11/18/2021 128* 0 - 99 mg/dL Final    Glucose 11/18/2021 127* 65 - 99 mg/dL Final    BUN 11/18/2021 20  8 - 27 mg/dL Final    Creatinine 11/18/2021 0.56* 0.57 - 1.00 mg/dL Final    GFR est non-AA 11/18/2021 93  >59 mL/min/1.73 Final    GFR est AA 11/18/2021 107  >59 mL/min/1.73 Final    Comment: **In accordance with recommendations from the NKF-ASN Task force,**    [facility] is in the process of updating its eGFR calculation to the    2021 CKD-EPI creatinine equation that estimates kidney function    without a race variable.       BUN/Creatinine ratio 11/18/2021 36* 12 - 28 Final    Sodium 11/18/2021 144  134 - 144 mmol/L Final    Potassium 11/18/2021 4.2  3.5 - 5.2 mmol/L Final    Chloride 11/18/2021 107* 96 - 106 mmol/L Final    CO2 11/18/2021 22  20 - 29 mmol/L Final    Calcium 11/18/2021 9.7  8.7 - 10.3 mg/dL Final    Protein, total 11/18/2021 6.7  6.0 - 8.5 g/dL Final    Albumin 11/18/2021 4.4  3.7 - 4.7 g/dL Final    GLOBULIN, TOTAL 11/18/2021 2.3  1.5 - 4.5 g/dL Final    A-G Ratio 11/18/2021 1.9  1.2 - 2.2 Final    Bilirubin, total 11/18/2021 <0.2  0.0 - 1.2 mg/dL Final    Alk. phosphatase 11/18/2021 85  44 - 121 IU/L Final                  **Please note reference interval change**    AST (SGOT) 11/18/2021 13  0 - 40 IU/L Final    ALT (SGPT) 11/18/2021 12  0 - 32 IU/L Final    Sed rate (ESR) 11/18/2021 17  0 - 40 mm/hr Final    INTERPRETATION 11/18/2021 Note   Final    Supplemental report is available.  Urine Culture, Routine 11/18/2021    Final                    Value:Mixed urogenital lu  10,000-25,000 colony forming units per mL            Follow-up and Dispositions    · Return in about 6 months (around 5/24/2022).

## 2022-01-08 ENCOUNTER — TELEPHONE (OUTPATIENT)
Dept: FAMILY MEDICINE CLINIC | Age: 72
End: 2022-01-08

## 2022-01-08 DIAGNOSIS — H91.93 BILATERAL HEARING LOSS, UNSPECIFIED HEARING LOSS TYPE: ICD-10-CM

## 2022-01-08 DIAGNOSIS — H92.02 LEFT EAR PAIN: Primary | ICD-10-CM

## 2022-01-08 RX ORDER — AMOXICILLIN AND CLAVULANATE POTASSIUM 875; 125 MG/1; MG/1
1 TABLET, FILM COATED ORAL EVERY 12 HOURS
Qty: 20 TABLET | Refills: 0 | Status: SHIPPED | OUTPATIENT
Start: 2022-01-08 | End: 2022-01-18

## 2022-01-17 ENCOUNTER — TELEPHONE (OUTPATIENT)
Dept: FAMILY MEDICINE CLINIC | Age: 72
End: 2022-01-17

## 2022-01-18 NOTE — TELEPHONE ENCOUNTER
Spoke w/staff from Dr. Denis Vides office, the only available appts are on 2/1/22 in the morning, they were advised pt can only do an afternoon appt; pt is currently scheduled on 2/2/22 in the afternoon; Dr. Sam Melvin does not have any earlier appts this week or next week d/t pt being a new pt.

## 2022-01-18 NOTE — TELEPHONE ENCOUNTER
Left message for Dr. Christy Miller office to rtn call to attempt to get earlier appt for pt ([phone]).

## 2022-01-18 NOTE — TELEPHONE ENCOUNTER
Informed pt's daughter, Dr. Trice Olivas office does not have any earlier appts before 2/1/2022; daughter would like the ENT referral for another provider.

## 2022-01-18 NOTE — TELEPHONE ENCOUNTER
Daughter called increasing left ear pain despite being on antibiotic she has been referred to ENT can we get urgent appointment please    think has appt with dr.Bill Ludivina Ledbetter in Feb but it is too far

## 2022-01-19 ENCOUNTER — TELEPHONE (OUTPATIENT)
Dept: FAMILY MEDICINE CLINIC | Age: 72
End: 2022-01-19

## 2022-01-19 DIAGNOSIS — H92.02 LEFT EAR PAIN: Primary | ICD-10-CM

## 2022-01-19 DIAGNOSIS — H91.92 DECREASED HEARING OF LEFT EAR: ICD-10-CM

## 2022-01-19 DIAGNOSIS — H92.12 DISCHARGE OF LEFT EAR PRESENT: ICD-10-CM

## 2022-01-19 RX ORDER — CLINDAMYCIN HYDROCHLORIDE 150 MG/1
150 CAPSULE ORAL EVERY 6 HOURS
Qty: 40 CAPSULE | Refills: 0 | Status: SHIPPED | OUTPATIENT
Start: 2022-01-19 | End: 2022-01-29

## 2022-01-31 DIAGNOSIS — I10 ESSENTIAL HYPERTENSION: ICD-10-CM

## 2022-01-31 DIAGNOSIS — E11.8 CONTROLLED TYPE 2 DIABETES MELLITUS WITH COMPLICATION, WITHOUT LONG-TERM CURRENT USE OF INSULIN (HCC): ICD-10-CM

## 2022-01-31 RX ORDER — LISINOPRIL AND HYDROCHLOROTHIAZIDE 12.5; 2 MG/1; MG/1
TABLET ORAL
Qty: 90 TABLET | Refills: 0 | Status: SHIPPED | OUTPATIENT
Start: 2022-01-31 | End: 2022-04-28 | Stop reason: SDUPTHER

## 2022-02-18 ENCOUNTER — NURSE TRIAGE (OUTPATIENT)
Dept: OTHER | Facility: CLINIC | Age: 72
End: 2022-02-18

## 2022-02-18 NOTE — TELEPHONE ENCOUNTER
Received call from Maverick Dietz at [facility] with Red Flag Complaint. Subjective: Caller states \"pt is starting to feel weak and feels burning with urination\"     Current Symptoms: see above    Onset: 3 days ago; worsening    Associated Symptoms: reduced appetite    Pain Severity: 8/10; burning with urination; intermittent    Temperature: 101    What has been tried: Tylenol    LMP: NA Pregnant: NA    Recommended disposition: see in office within 4 hrs    Care advice provided, patient verbalizes understanding; denies any other questions or concerns; instructed to call back for any new or worsening symptoms. Patient/Caller agrees with recommended disposition; writer provided warm transfer to Stef Agee at [facility] for appointment scheduling    Attention Provider: Thank you for allowing me to participate in the care of your patient. The patient was connected to triage in response to information provided to the [facility]. Please do not respond through this encounter as the response is not directed to a shared pool.         Reason for Disposition   SEVERE pain with urination    Protocols used: URINATION PAIN - FEMALE-ADULT-[place]

## 2022-02-21 ENCOUNTER — TELEPHONE (OUTPATIENT)
Dept: FAMILY MEDICINE CLINIC | Age: 72
End: 2022-02-21

## 2022-02-21 NOTE — TELEPHONE ENCOUNTER
Madison Augustin can you call them later to see if she still need evaluation       Toula Leventhal, RN routed conversation to Alistair Reynolds 3 days ago     Toula Leventhal, RN 3 days ago     VM       Received call from Mino at [facility] with [employer] Complaint.     Subjective: Caller states \"pt is starting to feel weak and feels burning with urination\"      Current Symptoms: see above     Onset: 3 days ago; worsening     Associated Symptoms: reduced appetite     Pain Severity: 8/10; burning with urination; intermittent     Temperature: 101     What has been tried: Tylenol     LMP: NA Pregnant: NA     Recommended disposition: see in office within 4 hrs     Care advice provided, patient verbalizes understanding; denies any other questions or concerns; instructed to call back for any new or worsening symptoms.     Patient/Caller agrees with recommended disposition; writer provided warm transfer to Stef Agee at [facility] for appointment scheduling     Attention Provider: Thank you for allowing me to participate in the care of your patient. The patient was connected to triage in response to information provided to the [facility].   Please do not respond through this encounter as the response is not directed to a shared pool.           Reason for Disposition   SEVERE pain with urination    Protocols used: URINATION PAIN [facility]               Documentation      [address]  Toula Leventhal, RN 3 days ago       Care Advice    Patient/Caregiver understands and will follow care advice?: Yes, plans to follow advice        Care Advice Text Modified Given By Given At   CALL BACK IF:  No Toula Leventhal, RN 2/18/2022 10:33 AM   GO TO OFFICE NOW: No Toula Leventhal, RN 2/18/2022 10:34 AM   DRINK [employer] FLUIDS: No Toula Leventhal, RN 2/18/2022 10:38 AM

## 2022-02-21 NOTE — TELEPHONE ENCOUNTER
Spoke w/pt and pt would like to be seen but does not have transportation for today, 2/21/22; pt scheduled on:   Future Appointments   Date Time Provider Salome Chakraborty   2/22/2022  1:45 PM Helene Osgood, MD [facility]

## 2022-02-22 ENCOUNTER — OFFICE VISIT (OUTPATIENT)
Dept: FAMILY MEDICINE CLINIC | Age: 72
End: 2022-02-22
Payer: MEDICAID

## 2022-02-22 ENCOUNTER — APPOINTMENT (OUTPATIENT)
Dept: FAMILY MEDICINE CLINIC | Age: 72
End: 2022-02-22

## 2022-02-22 VITALS
HEIGHT: 62 IN | HEART RATE: 92 BPM | OXYGEN SATURATION: 98 % | RESPIRATION RATE: 16 BRPM | TEMPERATURE: 98 F | BODY MASS INDEX: 30.36 KG/M2 | SYSTOLIC BLOOD PRESSURE: 112 MMHG | DIASTOLIC BLOOD PRESSURE: 62 MMHG | WEIGHT: 165 LBS

## 2022-02-22 DIAGNOSIS — I95.9 HYPOTENSION, UNSPECIFIED HYPOTENSION TYPE: ICD-10-CM

## 2022-02-22 DIAGNOSIS — N39.0 URINARY TRACT INFECTION WITHOUT HEMATURIA, SITE UNSPECIFIED: ICD-10-CM

## 2022-02-22 DIAGNOSIS — E11.8 CONTROLLED TYPE 2 DIABETES MELLITUS WITH COMPLICATION, WITHOUT LONG-TERM CURRENT USE OF INSULIN (HCC): ICD-10-CM

## 2022-02-22 DIAGNOSIS — R30.0 DYSURIA: Primary | ICD-10-CM

## 2022-02-22 LAB
BILIRUB UR QL STRIP: ABNORMAL
GLUCOSE UR-MCNC: NEGATIVE MG/DL
KETONES P FAST UR STRIP-MCNC: ABNORMAL MG/DL
PH UR STRIP: 5 [PH] (ref 4.6–8)
PROT UR QL STRIP: ABNORMAL
SP GR UR STRIP: 1.01 (ref 1–1.03)
UA UROBILINOGEN AMB POC: ABNORMAL (ref 0.2–1)
URINALYSIS CLARITY POC: ABNORMAL
URINALYSIS COLOR POC: ABNORMAL
URINE BLOOD POC: ABNORMAL
URINE LEUKOCYTES POC: ABNORMAL
URINE NITRITES POC: POSITIVE

## 2022-02-22 PROCEDURE — 99214 OFFICE O/P EST MOD 30 MIN: CPT | Performed by: LEGAL MEDICINE

## 2022-02-22 PROCEDURE — 96372 THER/PROPH/DIAG INJ SC/IM: CPT | Performed by: LEGAL MEDICINE

## 2022-02-22 PROCEDURE — 81003 URINALYSIS AUTO W/O SCOPE: CPT | Performed by: LEGAL MEDICINE

## 2022-02-22 RX ORDER — CEFTRIAXONE 500 MG/1
500 INJECTION, POWDER, FOR SOLUTION INTRAMUSCULAR; INTRAVENOUS ONCE
Qty: 1 EACH | Refills: 0
Start: 2022-02-22 | End: 2022-02-22

## 2022-02-22 RX ORDER — AMOXICILLIN AND CLAVULANATE POTASSIUM 875; 125 MG/1; MG/1
1 TABLET, FILM COATED ORAL EVERY 12 HOURS
Qty: 20 TABLET | Refills: 0 | Status: SHIPPED | OUTPATIENT
Start: 2022-02-22 | End: 2022-03-04

## 2022-02-22 NOTE — PROGRESS NOTES
[facility]     Chief Complaint   Patient presents with    Urinary Burning     ongoing for 7 days patient has body aches and had a fever this morning which was 101.0 but took tylenol      Vitals:    02/22/22 1407 02/22/22 1515   BP: 94/65 112/62   Pulse: (!) 110 92   Resp: 16    Temp: 98 °F (36.7 °C)    TempSrc: Temporal    SpO2: 98%    Weight: 165 lb (74.8 kg)    Height: 5' 1.5\" (1.562 m)    PainSc:   7    PainLoc: Generalized          HPI: is here for burring urination for 7 days associated with fever, has been  Taking tylenol on a regular basis last dose 1 hour ago she had fever at home 101  Has decreased appetite  She has not been drinking trying to drink more liquids    She is accompanied by her daughter today Victorina Crook    Past Medical History:   Diagnosis Date    Diabetes ([address]     Hypercholesteremia     Hypertension      Past Surgical History:   Procedure Laterality Date    HX CHOLECYSTECTOMY       Social History     Tobacco Use    Smoking status: Never Smoker    Smokeless tobacco: Never Used   Substance Use Topics    Alcohol use: No       Family History   Problem Relation Age of Onset    Diabetes Mother     Hypertension Mother        Review of Systems   Constitutional: Positive for malaise/fatigue. Negative for chills, fever and weight loss. HENT: Negative for congestion, ear discharge, ear pain, hearing loss, nosebleeds, sinus pain and sore throat. Eyes: Negative for blurred vision, double vision and discharge. Respiratory: Negative for cough. Cardiovascular: Negative for chest pain, palpitations, claudication and leg swelling. Gastrointestinal: Negative for abdominal pain, constipation, diarrhea, heartburn, nausea and vomiting. Genitourinary: Positive for dysuria. Negative for flank pain, frequency, hematuria and urgency. Musculoskeletal: Positive for back pain. Negative for myalgias. Skin: Negative for itching and rash.    Neurological: Negative for dizziness, tingling, sensory change, speech change, focal weakness, weakness and headaches. Psychiatric/Behavioral: Negative for depression and suicidal ideas. Physical Exam  Vitals and nursing note reviewed. Constitutional:       General: She is not in acute distress. Appearance: Normal appearance. She is well-developed and normal weight. She is ill-appearing. She is not toxic-appearing or diaphoretic. HENT:      Head: Normocephalic and atraumatic. Eyes:      General: No scleral icterus. Neck:      Thyroid: No thyromegaly. Cardiovascular:      Rate and Rhythm: Normal rate and regular rhythm. Heart sounds: Normal heart sounds. Pulmonary:      Effort: Pulmonary effort is normal. No respiratory distress. Breath sounds: Normal breath sounds. No wheezing or rales. Chest:      Chest wall: No tenderness. Abdominal:      General: There is no distension. Palpations: Abdomen is soft. Tenderness: There is no abdominal tenderness. There is right CVA tenderness and left CVA tenderness. There is no rebound. Musculoskeletal:         General: No tenderness or deformity. Normal range of motion. Lymphadenopathy:      Cervical: No cervical adenopathy. Skin:     General: Skin is warm and dry. Coloration: Skin is not pale. Findings: No erythema or rash. Neurological:      Mental Status: She is alert and oriented to person, place, and time. Cranial Nerves: No cranial nerve deficit. Coordination: Coordination normal.   Psychiatric:         Behavior: Behavior normal.         Thought Content: Thought content normal.         Judgment: Judgment normal.          Assessment and plan     Plan of care has been discussed with the patient, he agrees to the plan and verbalized understanding. All his questions were answered  More than 50% of the time spent in this visit was counseling the patient about  illness and treatment options         1.  Dysuria  Urinalysis showed UTI  She will be given Rocephin injection in the office today  And also started on oral antibiotics awaiting culture  - AMB POC URINALYSIS DIP STICK AUTO W/O MICRO  - CULTURE, URINE; Future  - amoxicillin-clavulanate (AUGMENTIN) 875-125 mg per tablet; Take 1 Tablet by mouth every twelve (12) hours for 10 days. Dispense: 20 Tablet; Refill: 0  - CULTURE, URINE    2. Controlled type 2 diabetes mellitus with complication, without long-term current use of insulin (HCC)    - HEMOGLOBIN A1C W/O EAG; Future  - CBC WITH AUTOMATED DIFF; Future  - METABOLIC PANEL, COMPREHENSIVE; Future  - METABOLIC PANEL, COMPREHENSIVE  - CBC WITH AUTOMATED DIFF  - HEMOGLOBIN A1C W/O EAG    3. Urinary tract infection without hematuria, site unspecified      - CULTURE, URINE; Future  - CBC WITH AUTOMATED DIFF; Future  - METABOLIC PANEL, COMPREHENSIVE; Future  - amoxicillin-clavulanate (AUGMENTIN) 875-125 mg per tablet; Take 1 Tablet by mouth every twelve (12) hours for 10 days. Dispense: 20 Tablet; Refill: 0  - cefTRIAXone (ROCEPHIN) 500 mg injection; 500 mg by IntraMUSCular route once for 1 dose. Dispense: 1 Each; Refill: 0  - CEFTRIAXONE SODIUM INJECTION  MG  - VT THER/PROPH/DIAG INJECTION, SUBCUT/IM  - METABOLIC PANEL, COMPREHENSIVE  - CBC WITH AUTOMATED DIFF  - CULTURE, URINE    4. Hypotension, unspecified hypotension type  I discussed with her daughter the need to monitor blood pressure make sure she does not take her medication the lisinopril  She does not have any nausea or vomiting she can drink liquids and eat solids, she can drink Gatorade    If  get worse you need to go to the hospital  - METABOLIC PANEL, COMPREHENSIVE; Future  - METABOLIC PANEL, COMPREHENSIVE    Current Outpatient Medications   Medication Sig Dispense Refill    amoxicillin-clavulanate (AUGMENTIN) 875-125 mg per tablet Take 1 Tablet by mouth every twelve (12) hours for 10 days.  20 Tablet 0    cefTRIAXone (ROCEPHIN) 500 mg injection 500 mg by IntraMUSCular route once for 1 dose. 1 Each 0    lisinopril-hydroCHLOROthiazide (PRINZIDE, ZESTORETIC) 20-12.5 mg per tablet Take 1 tablet by mouth once daily 90 Tablet 0    metFORMIN (GLUCOPHAGE) 1,000 mg tablet Take 1 Tablet by mouth two (2) times daily (with meals). 180 Tablet 3    calcium-cholecalciferol, D3, (CALTRATE 600+D) tablet Take 1 Tab by mouth daily.  omega-3 acid ethyl esters (LOVAZA) 1 gram capsule Take 2 g by mouth two (2) times a day.  cholecalciferol (Vitamin D3) 25 mcg (1,000 unit) cap Take 4,000 Units by mouth daily.  aspirin delayed-release 81 mg tablet Take 81 mg by mouth daily.  Restasis 0.05 % dpet  (Patient not taking: Reported on 2/22/2022)      simvastatin (ZOCOR) 10 mg tablet Take 1 Tablet by mouth nightly for 90 days.  (Patient not taking: Reported on 2/22/2022) 90 Tablet 3    tretinoin (RETIN-A) 0.025 % topical cream Use  At night daily (Patient not taking: Reported on 11/24/2021) 45 g 0       Patient Active Problem List    Diagnosis Date Noted    Controlled type 2 diabetes mellitus with complication, without long-term current use of insulin ([facility] 07/02/2019    DDD (degenerative disc disease), cervical 08/27/2018    Essential hypertension 08/27/2018    Mixed hyperlipidemia 08/27/2018    Osteoporosis 08/27/2018    Irritable bowel syndrome with both constipation and diarrhea 08/27/2018    Osteoarthritis of both knees 08/27/2018    Primary osteoarthritis of right knee 01/13/2017     Results for orders placed or performed in visit on 02/22/22   AMB POC URINALYSIS DIP STICK AUTO W/O MICRO   Result Value Ref Range    Color (UA POC) Orange     Clarity (UA POC) Cloudy     Glucose (UA POC) Negative Negative    Bilirubin (UA POC) 1+ Negative    Ketones (UA POC) Trace Negative    Specific gravity (UA POC) 1.015 1.001 - 1.035    Blood (UA POC) 3+ Negative    pH (UA POC) 5.0 4.6 - 8.0    Protein (UA POC) 2+ Negative    Urobilinogen (UA POC) 1 mg/dL 0.2 - 1    Nitrites (UA POC) Positive Negative    Leukocyte esterase (UA POC) 3+ Negative     Office Visit on 02/22/2022   Component Date Value Ref Range Status    Color (UA POC) 02/22/2022 [place]   Final    Clarity (UA POC) 02/22/2022 [place]   Final    Glucose (UA POC) 02/22/2022 Negative  Negative Final    Bilirubin (UA POC) 02/22/2022 1+  Negative Final    Ketones (UA POC) 02/22/2022 Trace  Negative Final    Specific gravity (UA POC) 02/22/2022 1.015  1.001 - 1.035 Final    Blood (UA POC) 02/22/2022 3+  Negative Final    pH (UA POC) 02/22/2022 5.0  4.6 - 8.0 Final    Protein (UA POC) 02/22/2022 2+  Negative Final    Urobilinogen (UA POC) 02/22/2022 1 mg/dL  0.2 - 1 Final    Nitrites (UA POC) 02/22/2022 Positive  Negative Final    Leukocyte esterase (UA POC) 02/22/2022 3+  Negative Final          Follow-up and Dispositions    · Return in about 1 week (around 3/1/2022) for as per results.

## 2022-02-23 ENCOUNTER — TELEPHONE (OUTPATIENT)
Dept: FAMILY MEDICINE CLINIC | Age: 72
End: 2022-02-23

## 2022-02-23 LAB
ALBUMIN SERPL-MCNC: 3.4 G/DL (ref 3.7–4.7)
ALBUMIN/GLOB SERPL: 1.2 {RATIO} (ref 1.2–2.2)
ALP SERPL-CCNC: 135 IU/L (ref 44–121)
ALT SERPL-CCNC: 27 IU/L (ref 0–32)
AST SERPL-CCNC: 23 IU/L (ref 0–40)
BASOPHILS # BLD AUTO: 0.1 X10E3/UL (ref 0–0.2)
BASOPHILS NFR BLD AUTO: 0 %
BILIRUB SERPL-MCNC: 0.8 MG/DL (ref 0–1.2)
BUN SERPL-MCNC: 32 MG/DL (ref 8–27)
BUN/CREAT SERPL: 32 (ref 12–28)
CALCIUM SERPL-MCNC: 9.4 MG/DL (ref 8.7–10.3)
CHLORIDE SERPL-SCNC: 89 MMOL/L (ref 96–106)
CO2 SERPL-SCNC: 21 MMOL/L (ref 20–29)
CREAT SERPL-MCNC: 1 MG/DL (ref 0.57–1)
EOSINOPHIL # BLD AUTO: 0 X10E3/UL (ref 0–0.4)
EOSINOPHIL NFR BLD AUTO: 0 %
ERYTHROCYTE [DISTWIDTH] IN BLOOD BY AUTOMATED COUNT: 13.6 % (ref 11.7–15.4)
GLOBULIN SER CALC-MCNC: 2.8 G/DL (ref 1.5–4.5)
GLUCOSE SERPL-MCNC: 237 MG/DL (ref 65–99)
HBA1C MFR BLD: 7.4 % (ref 4.8–5.6)
HCT VFR BLD AUTO: 33.1 % (ref 34–46.6)
HGB BLD-MCNC: 11.2 G/DL (ref 11.1–15.9)
IMM GRANULOCYTES # BLD AUTO: 0.3 X10E3/UL (ref 0–0.1)
IMM GRANULOCYTES NFR BLD AUTO: 2 %
INTERPRETATION: NORMAL
LYMPHOCYTES # BLD AUTO: 1.4 X10E3/UL (ref 0.7–3.1)
LYMPHOCYTES NFR BLD AUTO: 8 %
MCH RBC QN AUTO: 27.3 PG (ref 26.6–33)
MCHC RBC AUTO-ENTMCNC: 33.8 G/DL (ref 31.5–35.7)
MCV RBC AUTO: 81 FL (ref 79–97)
MONOCYTES # BLD AUTO: 1.6 X10E3/UL (ref 0.1–0.9)
MONOCYTES NFR BLD AUTO: 9 %
NEUTROPHILS # BLD AUTO: 14.5 X10E3/UL (ref 1.4–7)
NEUTROPHILS NFR BLD AUTO: 81 %
PLATELET # BLD AUTO: 264 X10E3/UL (ref 150–450)
POTASSIUM SERPL-SCNC: 4.5 MMOL/L (ref 3.5–5.2)
PROT SERPL-MCNC: 6.2 G/DL (ref 6–8.5)
RBC # BLD AUTO: 4.11 X10E6/UL (ref 3.77–5.28)
SODIUM SERPL-SCNC: 129 MMOL/L (ref 134–144)
WBC # BLD AUTO: 18 X10E3/UL (ref 3.4–10.8)

## 2022-02-23 NOTE — TELEPHONE ENCOUNTER
I have called her daughters kyler and Mike Tanner I discussed with them the results about elevated white blood cell count, hyponatremia  And increased BUN/creatinine ratio    She is doing better she has not had any fever since yesterday advised them not to give Tylenol before checking temperature, also her appetite has been better she has been eating and drinking liquids.     If she continues to go to the hospital, if she continued to do better clinically I schedule appointment for her tomorrow to be seen reevaluated and get repeat of her blood work    Both agreed and verbalized understanding

## 2022-02-24 ENCOUNTER — TELEPHONE (OUTPATIENT)
Dept: FAMILY MEDICINE CLINIC | Age: 72
End: 2022-02-24

## 2022-02-24 ENCOUNTER — APPOINTMENT (OUTPATIENT)
Dept: FAMILY MEDICINE CLINIC | Age: 72
End: 2022-02-24

## 2022-02-24 ENCOUNTER — OFFICE VISIT (OUTPATIENT)
Dept: FAMILY MEDICINE CLINIC | Age: 72
End: 2022-02-24

## 2022-02-24 VITALS
TEMPERATURE: 97.8 F | SYSTOLIC BLOOD PRESSURE: 123 MMHG | WEIGHT: 165 LBS | HEIGHT: 62 IN | DIASTOLIC BLOOD PRESSURE: 85 MMHG | RESPIRATION RATE: 14 BRPM | OXYGEN SATURATION: 100 % | HEART RATE: 98 BPM | BODY MASS INDEX: 30.36 KG/M2

## 2022-02-24 DIAGNOSIS — I95.9 HYPOTENSION, UNSPECIFIED HYPOTENSION TYPE: ICD-10-CM

## 2022-02-24 DIAGNOSIS — N39.0 URINARY TRACT INFECTION WITHOUT HEMATURIA, SITE UNSPECIFIED: ICD-10-CM

## 2022-02-24 DIAGNOSIS — E87.1 HYPONATREMIA: ICD-10-CM

## 2022-02-24 DIAGNOSIS — D72.829 LEUKOCYTOSIS, UNSPECIFIED TYPE: Primary | ICD-10-CM

## 2022-02-24 DIAGNOSIS — D72.829 LEUKOCYTOSIS, UNSPECIFIED TYPE: ICD-10-CM

## 2022-02-24 DIAGNOSIS — N39.0 URINARY TRACT INFECTION WITHOUT HEMATURIA, SITE UNSPECIFIED: Primary | ICD-10-CM

## 2022-02-24 PROCEDURE — 99214 OFFICE O/P EST MOD 30 MIN: CPT | Performed by: LEGAL MEDICINE

## 2022-02-24 NOTE — PROGRESS NOTES
Shelly Chatman     Chief Complaint   Patient presents with    Follow-up     labs      Vitals:    02/24/22 1510   BP: 123/85   Pulse: 98   Resp: 14   Temp: 97.8 °F (36.6 °C)   TempSrc: Temporal   SpO2: 100%   Weight: 165 lb (74.8 kg)   Height: 5' 1.5\" (1.562 m)   PainSc:   5   PainLoc: Generalized         HPI:Mrs. Shelly Chatman is here for follow-up accompanied by her daughter Marlane Kussmaul,  diagnosed with UTI, lab results shows elevated white blood cell count at 18,000, results showed hyponatremia as well, clinically she is doing better she does not have any fever appetite has improved, she has been having sweats and light meals, and drinking water having cranberry juice and cranberry tablets, she also feels better    Past Medical History:   Diagnosis Date    Diabetes ([facility]     Hypercholesteremia     Hypertension      Past Surgical History:   Procedure Laterality Date    HX CHOLECYSTECTOMY       Social History     Tobacco Use    Smoking status: Never Smoker    Smokeless tobacco: Never Used   Substance Use Topics    Alcohol use: No       Family History   Problem Relation Age of Onset    Diabetes Mother     Hypertension Mother        Review of Systems   Constitutional: Positive for malaise/fatigue and weight loss. Negative for chills, diaphoresis and fever. HENT: Negative for congestion, ear discharge, ear pain, hearing loss, nosebleeds, sinus pain and sore throat. Eyes: Negative for blurred vision, double vision and discharge. Respiratory: Negative for cough and stridor. Cardiovascular: Negative for chest pain, palpitations, claudication and leg swelling. Gastrointestinal: Negative for abdominal pain, blood in stool, diarrhea, nausea and vomiting. Genitourinary: Negative for dysuria, frequency, hematuria and urgency. Musculoskeletal: Negative for myalgias. Skin: Negative for itching and rash.    Neurological: Negative for dizziness, tingling, sensory change, speech change, focal weakness, weakness and headaches. Psychiatric/Behavioral: Negative for depression and suicidal ideas. Physical Exam  Vitals and nursing note reviewed. Constitutional:       General: She is not in acute distress. Appearance: Normal appearance. She is well-developed. She is not ill-appearing, toxic-appearing or diaphoretic. HENT:      Head: Normocephalic and atraumatic. Eyes:      General: No scleral icterus. Right eye: No discharge. Left eye: No discharge. Neck:      Thyroid: No thyromegaly. Cardiovascular:      Rate and Rhythm: Normal rate and regular rhythm. Pulses: Normal pulses. Heart sounds: Normal heart sounds. Pulmonary:      Effort: Pulmonary effort is normal. No respiratory distress. Breath sounds: Normal breath sounds. No wheezing or rales. Chest:      Chest wall: No tenderness. Abdominal:      General: There is no distension. Palpations: Abdomen is soft. There is no mass. Tenderness: There is no abdominal tenderness. There is no right CVA tenderness, left CVA tenderness, guarding or rebound. Hernia: No hernia is present. Comments: Flank tenderness has resolved compared to 2 days ago   Musculoskeletal:         General: No tenderness or deformity. Normal range of motion. Cervical back: Normal range of motion and neck supple. Lymphadenopathy:      Cervical: No cervical adenopathy. Skin:     General: Skin is warm and dry. Coloration: Skin is not jaundiced or pale. Findings: No bruising, erythema, lesion or rash. Neurological:      Mental Status: She is alert and oriented to person, place, and time. Cranial Nerves: No cranial nerve deficit. Coordination: Coordination normal.   Psychiatric:         Mood and Affect: Mood normal.         Behavior: Behavior normal.         Thought Content:  Thought content normal.         Judgment: Judgment normal.          Assessment and plan     Plan of care has been discussed with the patient, he agrees to the plan and verbalized understanding. All his questions were answered  More than 50% of the time spent in this visit was counseling the patient about  illness and treatment options         1. Urinary tract infection without hematuria, site unspecified  Currently taking Augmentin 875 twice daily  Dysuria symptom has improved    2. Hypotension, unspecified hypotension type  Blood pressure is much better today she still not taking lisinopril/hydrochlorothiazide    She can resume when able to 3 days    3. Leukocytosis, unspecified type  She has neutrophilia  Repeat CBC is pending   4. Hyponatremia  Now patient is having better appetite and she eating her expecting this will improve    Repeat CMP is pending    Current Outpatient Medications   Medication Sig Dispense Refill    amoxicillin-clavulanate (AUGMENTIN) 875-125 mg per tablet Take 1 Tablet by mouth every twelve (12) hours for 10 days. 20 Tablet 0    lisinopril-hydroCHLOROthiazide (PRINZIDE, ZESTORETIC) 20-12.5 mg per tablet Take 1 tablet by mouth once daily 90 Tablet 0    metFORMIN (GLUCOPHAGE) 1,000 mg tablet Take 1 Tablet by mouth two (2) times daily (with meals). 180 Tablet 3    calcium-cholecalciferol, D3, (CALTRATE 600+D) tablet Take 1 Tab by mouth daily.  omega-3 acid ethyl esters (LOVAZA) 1 gram capsule Take 2 g by mouth two (2) times a day.  cholecalciferol (Vitamin D3) 25 mcg (1,000 unit) cap Take 4,000 Units by mouth daily.  aspirin delayed-release 81 mg tablet Take 81 mg by mouth daily.       Restasis 0.05 % dpet  (Patient not taking: Reported on 2/22/2022)      tretinoin (RETIN-A) 0.025 % topical cream Use  At night daily (Patient not taking: Reported on 11/24/2021) 45 g 0       Patient Active Problem List    Diagnosis Date Noted    Controlled type 2 diabetes mellitus with complication, without long-term current use of insulin ([facility] 07/02/2019    DDD (degenerative disc disease), cervical 08/27/2018    Essential hypertension 08/27/2018    Mixed hyperlipidemia 08/27/2018    Osteoporosis 08/27/2018    Irritable bowel syndrome with both constipation and diarrhea 08/27/2018    Osteoarthritis of both knees 08/27/2018    Primary osteoarthritis of right knee 01/13/2017     Results for orders placed or performed in visit on 46/03/34   METABOLIC PANEL, COMPREHENSIVE   Result Value Ref Range    Glucose 237 (H) 65 - 99 mg/dL    BUN 32 (H) 8 - 27 mg/dL    Creatinine 1.00 0.57 - 1.00 mg/dL    GFR est non-AA 56 (L) >59 mL/min/1.73    GFR est AA 64 >59 mL/min/1.73    BUN/Creatinine ratio 32 (H) 12 - 28    Sodium 129 (L) 134 - 144 mmol/L    Potassium 4.5 3.5 - 5.2 mmol/L    Chloride 89 (L) 96 - 106 mmol/L    CO2 21 20 - 29 mmol/L    Calcium 9.4 8.7 - 10.3 mg/dL    Protein, total 6.2 6.0 - 8.5 g/dL    Albumin 3.4 (L) 3.7 - 4.7 g/dL    GLOBULIN, TOTAL 2.8 1.5 - 4.5 g/dL    A-G Ratio 1.2 1.2 - 2.2    Bilirubin, total 0.8 0.0 - 1.2 mg/dL    Alk. phosphatase 135 (H) 44 - 121 IU/L    AST (SGOT) 23 0 - 40 IU/L    ALT (SGPT) 27 0 - 32 IU/L   CBC WITH AUTOMATED DIFF   Result Value Ref Range    WBC 18.0 (H) 3.4 - 10.8 x10E3/uL    RBC 4.11 3.77 - 5.28 x10E6/uL    HGB 11.2 11.1 - 15.9 g/dL    HCT 33.1 (L) 34.0 - 46.6 %    MCV 81 79 - 97 fL    MCH 27.3 26.6 - 33.0 pg    MCHC 33.8 31.5 - 35.7 g/dL    RDW 13.6 11.7 - 15.4 %    PLATELET 872 530 - 291 x10E3/uL    NEUTROPHILS 81 Not Estab. %    Lymphocytes 8 Not Estab. %    MONOCYTES 9 Not Estab. %    EOSINOPHILS 0 Not Estab. %    BASOPHILS 0 Not Estab. %    ABS. NEUTROPHILS 14.5 (H) 1.4 - 7.0 x10E3/uL    Abs Lymphocytes 1.4 0.7 - 3.1 x10E3/uL    ABS. MONOCYTES 1.6 (H) 0.1 - 0.9 x10E3/uL    ABS. EOSINOPHILS 0.0 0.0 - 0.4 x10E3/uL    ABS. BASOPHILS 0.1 0.0 - 0.2 x10E3/uL    IMMATURE GRANULOCYTES 2 Not Estab. %    ABS. IMM.  GRANS. 0.3 (H) 0.0 - 0.1 x10E3/uL   HEMOGLOBIN A1C W/O EAG   Result Value Ref Range    Hemoglobin A1c 7.4 (H) 4.8 - 5.6 %   CKD REPORT   Result Value Ref Range Interpretation Note    AMB POC URINALYSIS DIP STICK AUTO W/O MICRO   Result Value Ref Range    Color (UA POC) Orange     Clarity (UA POC) Cloudy     Glucose (UA POC) Negative Negative    Bilirubin (UA POC) 1+ Negative    Ketones (UA POC) Trace Negative    Specific gravity (UA POC) 1.015 1.001 - 1.035    Blood (UA POC) 3+ Negative    pH (UA POC) 5.0 4.6 - 8.0    Protein (UA POC) 2+ Negative    Urobilinogen (UA POC) 1 mg/dL 0.2 - 1    Nitrites (UA POC) Positive Negative    Leukocyte esterase (UA POC) 3+ Negative     Office Visit on 02/22/2022   Component Date Value Ref Range Status    Color (UA POC) 02/22/2022 [place]   Final    Clarity (UA POC) 02/22/2022 [place]   Final    Glucose (UA POC) 02/22/2022 Negative  Negative Final    Bilirubin (UA POC) 02/22/2022 1+  Negative Final    Ketones (UA POC) 02/22/2022 Trace  Negative Final    Specific gravity (UA POC) 02/22/2022 1.015  1.001 - 1.035 Final    Blood (UA POC) 02/22/2022 3+  Negative Final    pH (UA POC) 02/22/2022 5.0  4.6 - 8.0 Final    Protein (UA POC) 02/22/2022 2+  Negative Final    Urobilinogen (UA POC) 02/22/2022 1 mg/dL  0.2 - 1 Final    Nitrites (UA POC) 02/22/2022 Positive  Negative Final    Leukocyte esterase (UA POC) 02/22/2022 3+  Negative Final    Glucose 02/22/2022 237* 65 - 99 mg/dL Final    BUN 02/22/2022 32* 8 - 27 mg/dL Final    Creatinine 02/22/2022 1.00  0.57 - 1.00 mg/dL Final    Comment:                **Effective February 28, 2022 Shai Cordova will begin**                   reporting the 2021 CKD-EPI creatinine equation that                   estimates kidney function without a race variable.       GFR est non-AA 02/22/2022 56* >59 mL/min/1.73 Final    GFR est AA 02/22/2022 64  >59 mL/min/1.73 Final    Comment: **In accordance with recommendations from the NKF-ASN Task force,**    [facility] is in the process of updating its eGFR calculation to the    2021 CKD-EPI creatinine equation that estimates kidney function    without a race variable.  BUN/Creatinine ratio 02/22/2022 32* 12 - 28 Final    Sodium 02/22/2022 129* 134 - 144 mmol/L Final    Potassium 02/22/2022 4.5  3.5 - 5.2 mmol/L Final    Chloride 02/22/2022 89* 96 - 106 mmol/L Final    CO2 02/22/2022 21  20 - 29 mmol/L Final    Calcium 02/22/2022 9.4  8.7 - 10.3 mg/dL Final    Protein, total 02/22/2022 6.2  6.0 - 8.5 g/dL Final    Albumin 02/22/2022 3.4* 3.7 - 4.7 g/dL Final    GLOBULIN, TOTAL 02/22/2022 2.8  1.5 - 4.5 g/dL Final    A-G Ratio 02/22/2022 1.2  1.2 - 2.2 Final    Bilirubin, total 02/22/2022 0.8  0.0 - 1.2 mg/dL Final    Alk. phosphatase 02/22/2022 135* 44 - 121 IU/L Final    AST (SGOT) 02/22/2022 23  0 - 40 IU/L Final    ALT (SGPT) 02/22/2022 27  0 - 32 IU/L Final    WBC 02/22/2022 18.0* 3.4 - 10.8 x10E3/uL Final    RBC 02/22/2022 4.11  3.77 - 5.28 x10E6/uL Final    HGB 02/22/2022 11.2  11.1 - 15.9 g/dL Final    HCT 02/22/2022 33.1* 34.0 - 46.6 % Final    MCV 02/22/2022 81  79 - 97 fL Final    MCH 02/22/2022 27.3  26.6 - 33.0 pg Final    MCHC 02/22/2022 33.8  31.5 - 35.7 g/dL Final    RDW 02/22/2022 13.6  11.7 - 15.4 % Final    PLATELET 19/58/7397 183  150 - 450 x10E3/uL Final    NEUTROPHILS 02/22/2022 81  Not Estab. % Final    Lymphocytes 02/22/2022 8  Not Estab. % Final    MONOCYTES 02/22/2022 9  Not Estab. % Final    EOSINOPHILS 02/22/2022 0  Not Estab. % Final    BASOPHILS 02/22/2022 0  Not Estab. % Final    ABS. NEUTROPHILS 02/22/2022 14.5* 1.4 - 7.0 x10E3/uL Final    Abs Lymphocytes 02/22/2022 1.4  0.7 - 3.1 x10E3/uL Final    ABS. MONOCYTES 02/22/2022 1.6* 0.1 - 0.9 x10E3/uL Final    ABS. EOSINOPHILS 02/22/2022 0.0  0.0 - 0.4 x10E3/uL Final    ABS. BASOPHILS 02/22/2022 0.1  0.0 - 0.2 x10E3/uL Final    IMMATURE GRANULOCYTES 02/22/2022 2  Not Estab. % Final    ABS. IMM.  GRANS. 02/22/2022 0.3* 0.0 - 0.1 x10E3/uL Final    Comment: (An elevated percentage of Immature Granulocytes has not been found  to be clinically significant as a sole clinical predictor of disease. Does NOT include bands or blast cells. Pregnancy associated  physiological leukocytosis may also show increased immature  granulocytes without clinical significance.)      Hemoglobin A1c 02/22/2022 7.4* 4.8 - 5.6 % Final    Comment:          Prediabetes: 5.7 - 6.4           Diabetes: >6.4           Glycemic control for adults with diabetes: <7.0      Interpretation 02/22/2022 Note   Final    Supplemental report is available. Follow-up and Dispositions    · Return in about 2 weeks (around 3/10/2022).

## 2022-02-24 NOTE — PROGRESS NOTES
Cassandra Cadena is a 74 y.o. female (: 1950) presenting to address:    Chief Complaint   Patient presents with    Follow-up     labs        Vitals:    22 1510   BP: 123/85   Pulse: 98   Resp: 14   Temp: 97.8 °F (36.6 °C)   TempSrc: Temporal   SpO2: 100%   Weight: 165 lb (74.8 kg)   Height: 5' 1.5\" (1.562 m)   PainSc:   5   PainLoc: Generalized       Hearing/Vision:   No exam data present    Learning Assessment:     Learning Assessment 2019   PRIMARY LEARNER Child(verónica)   PRIMARY LANGUAGE OTHER (COMMENT)   LEARNER PREFERENCE PRIMARY PICTURES     LISTENING     VIDEOS     DEMONSTRATION   ANSWERED BY daughter   RELATIONSHIP OTHER     Depression Screening:     3 most recent PHQ Screens 2022   Little interest or pleasure in doing things Not at all   Feeling down, depressed, irritable, or hopeless Not at all   Total Score PHQ 2 0     Fall Risk Assessment:     Fall Risk Assessment, last 12 mths 2022   Able to walk? Yes   Fall in past 12 months? 0   Do you feel unsteady? 0   Are you worried about falling 0     Abuse Screening:     Abuse Screening Questionnaire 2019   Do you ever feel afraid of your partner? N   Are you in a relationship with someone who physically or mentally threatens you? N   Is it safe for you to go home?  Y     ADL Assessment:     ADL Assessment 2019   Feeding yourself No Help Needed   Getting from bed to chair No Help Needed   Getting dressed No Help Needed   Bathing or showering No Help Needed   Walk across the room (includes cane/walker) No Help Needed   Using the telphone No Help Needed   Taking your medications No Help Needed   Preparing meals No Help Needed   Managing money (expenses/bills) No Help Needed   Moderately strenuous housework (laundry) No Help Needed   Shopping for personal items (toiletries/medicines) No Help Needed   Shopping for groceries No Help Needed   Driving No Help Needed   Climbing a flight of stairs No Help Needed   Getting to places beyond walking distances No Help Needed        Coordination of Care Questionaire:   1. \"Have you been to the ER, urgent care clinic since your last visit? Hospitalized since your last visit? \" No    2. \"Have you seen or consulted any other health care providers outside of the [address] [place] [address] since your last visit? \" No     3. For patients aged 39-70: Has the patient had a colonoscopy? Yes - no Care Gap present     If the patient is female:    4. For patients aged 41-77: Has the patient had a mammogram within the past 2 years? Yes - no Care Gap present    5. For patients aged 21-65: Has the patient had a pap smear? Yes - no Care Gap present    Advanced Directive:   1. Do you have an Advanced Directive? NO    2. Would you like information on Advanced Directives?  NO

## 2022-02-25 LAB
ALBUMIN SERPL-MCNC: 3.7 G/DL (ref 3.7–4.7)
ALBUMIN/GLOB SERPL: 1.1 {RATIO} (ref 1.2–2.2)
ALP SERPL-CCNC: 129 IU/L (ref 44–121)
ALT SERPL-CCNC: 26 IU/L (ref 0–32)
AST SERPL-CCNC: 31 IU/L (ref 0–40)
BACTERIA UR CULT: ABNORMAL
BACTERIA UR CULT: ABNORMAL
BASOPHILS # BLD AUTO: 0.1 X10E3/UL (ref 0–0.2)
BASOPHILS NFR BLD AUTO: 1 %
BILIRUB SERPL-MCNC: 0.4 MG/DL (ref 0–1.2)
BUN SERPL-MCNC: 16 MG/DL (ref 8–27)
BUN/CREAT SERPL: 22 (ref 12–28)
CALCIUM SERPL-MCNC: 10.1 MG/DL (ref 8.7–10.3)
CHLORIDE SERPL-SCNC: 98 MMOL/L (ref 96–106)
CO2 SERPL-SCNC: 17 MMOL/L (ref 20–29)
CREAT SERPL-MCNC: 0.74 MG/DL (ref 0.57–1)
EOSINOPHIL # BLD AUTO: 0.1 X10E3/UL (ref 0–0.4)
EOSINOPHIL NFR BLD AUTO: 1 %
ERYTHROCYTE [DISTWIDTH] IN BLOOD BY AUTOMATED COUNT: 13.6 % (ref 11.7–15.4)
GLOBULIN SER CALC-MCNC: 3.4 G/DL (ref 1.5–4.5)
GLUCOSE SERPL-MCNC: 192 MG/DL (ref 65–99)
HCT VFR BLD AUTO: 36.7 % (ref 34–46.6)
HGB BLD-MCNC: 11.8 G/DL (ref 11.1–15.9)
IMMATURE CELLS, 115398: ABNORMAL
LYMPHOCYTES # BLD AUTO: 3.7 X10E3/UL (ref 0.7–3.1)
LYMPHOCYTES NFR BLD AUTO: 28 %
MCH RBC QN AUTO: 27.1 PG (ref 26.6–33)
MCHC RBC AUTO-ENTMCNC: 32.2 G/DL (ref 31.5–35.7)
MCV RBC AUTO: 84 FL (ref 79–97)
METAMYELOCYTES NFR BLD: 5 % (ref 0–0)
MONOCYTES # BLD AUTO: 0.7 X10E3/UL (ref 0.1–0.9)
MONOCYTES NFR BLD AUTO: 5 %
MORPHOLOGY BLD-IMP: ABNORMAL
NEUTROPHILS # BLD AUTO: 7.9 X10E3/UL (ref 1.4–7)
NEUTROPHILS NFR BLD AUTO: 59 %
NEUTS BAND NFR BLD AUTO: 1 %
PLATELET # BLD AUTO: 346 X10E3/UL (ref 150–450)
POTASSIUM SERPL-SCNC: 4.6 MMOL/L (ref 3.5–5.2)
PROT SERPL-MCNC: 7.1 G/DL (ref 6–8.5)
RBC # BLD AUTO: 4.36 X10E6/UL (ref 3.77–5.28)
SODIUM SERPL-SCNC: 137 MMOL/L (ref 134–144)
WBC # BLD AUTO: 13.2 X10E3/UL (ref 3.4–10.8)

## 2022-02-25 NOTE — PROGRESS NOTES
Urine culture is positive for bacterial infection and the antibiotic she is taking currently is appropriate treatment for the bacteria

## 2022-02-25 NOTE — PROGRESS NOTES
All results are much improved continue antibiotics until it is finished, make sure to stay well-hydrated have on regular meals and  healthy snack

## 2022-03-18 PROBLEM — I10 ESSENTIAL HYPERTENSION: Status: ACTIVE | Noted: 2018-08-27

## 2022-03-19 PROBLEM — E78.2 MIXED HYPERLIPIDEMIA: Status: ACTIVE | Noted: 2018-08-27

## 2022-03-19 PROBLEM — E11.8 CONTROLLED TYPE 2 DIABETES MELLITUS WITH COMPLICATION, WITHOUT LONG-TERM CURRENT USE OF INSULIN (HCC): Status: ACTIVE | Noted: 2019-07-02

## 2022-03-19 PROBLEM — M17.11 PRIMARY OSTEOARTHRITIS OF RIGHT KNEE: Status: ACTIVE | Noted: 2017-01-13

## 2022-03-19 PROBLEM — M17.0 OSTEOARTHRITIS OF BOTH KNEES: Status: ACTIVE | Noted: 2018-08-27

## 2022-03-19 PROBLEM — K58.2 IRRITABLE BOWEL SYNDROME WITH BOTH CONSTIPATION AND DIARRHEA: Status: ACTIVE | Noted: 2018-08-27

## 2022-03-20 PROBLEM — M50.30 DDD (DEGENERATIVE DISC DISEASE), CERVICAL: Status: ACTIVE | Noted: 2018-08-27

## 2022-03-20 PROBLEM — M81.0 OSTEOPOROSIS: Status: ACTIVE | Noted: 2018-08-27

## 2022-04-12 ENCOUNTER — TELEPHONE (OUTPATIENT)
Dept: FAMILY MEDICINE CLINIC | Age: 72
End: 2022-04-12

## 2022-04-12 DIAGNOSIS — F43.21 GRIEF REACTION: ICD-10-CM

## 2022-04-12 DIAGNOSIS — M50.30 DDD (DEGENERATIVE DISC DISEASE), CERVICAL: Primary | ICD-10-CM

## 2022-04-12 RX ORDER — ZOLPIDEM TARTRATE 5 MG/1
5 TABLET ORAL
Qty: 30 TABLET | Refills: 0 | Status: SHIPPED | OUTPATIENT
Start: 2022-04-12 | End: 2022-05-12

## 2022-04-12 NOTE — TELEPHONE ENCOUNTER
Patient daughter called her mom is not able to sleep she just lost her sister last night and she would like something to help her sleep

## 2022-04-28 ENCOUNTER — OFFICE VISIT (OUTPATIENT)
Dept: FAMILY MEDICINE CLINIC | Age: 72
End: 2022-04-28
Payer: MEDICAID

## 2022-04-28 VITALS
HEART RATE: 88 BPM | TEMPERATURE: 98.4 F | OXYGEN SATURATION: 99 % | SYSTOLIC BLOOD PRESSURE: 138 MMHG | WEIGHT: 166.2 LBS | DIASTOLIC BLOOD PRESSURE: 79 MMHG | RESPIRATION RATE: 15 BRPM | BODY MASS INDEX: 30.59 KG/M2 | HEIGHT: 62 IN

## 2022-04-28 DIAGNOSIS — E11.8 CONTROLLED TYPE 2 DIABETES MELLITUS WITH COMPLICATION, WITHOUT LONG-TERM CURRENT USE OF INSULIN (HCC): ICD-10-CM

## 2022-04-28 DIAGNOSIS — I10 ESSENTIAL HYPERTENSION: ICD-10-CM

## 2022-04-28 DIAGNOSIS — M62.838 MUSCLE SPASM: Primary | ICD-10-CM

## 2022-04-28 DIAGNOSIS — M54.6 LEFT-SIDED THORACIC BACK PAIN, UNSPECIFIED CHRONICITY: ICD-10-CM

## 2022-04-28 PROCEDURE — 99214 OFFICE O/P EST MOD 30 MIN: CPT | Performed by: LEGAL MEDICINE

## 2022-04-28 PROCEDURE — 3051F HG A1C>EQUAL 7.0%<8.0%: CPT | Performed by: LEGAL MEDICINE

## 2022-04-28 RX ORDER — CYCLOBENZAPRINE HCL 5 MG
5 TABLET ORAL
Qty: 30 TABLET | Refills: 0 | Status: SHIPPED | OUTPATIENT
Start: 2022-04-28 | End: 2022-05-28

## 2022-04-28 RX ORDER — LISINOPRIL AND HYDROCHLOROTHIAZIDE 12.5; 2 MG/1; MG/1
1 TABLET ORAL DAILY
Qty: 90 TABLET | Refills: 1 | Status: SHIPPED | OUTPATIENT
Start: 2022-04-28

## 2022-04-28 NOTE — PROGRESS NOTES
Nathaly Baltazar is a 74 y.o. female (: 1950) presenting to address:    Chief Complaint   Patient presents with    Chest Pain       Vitals:    22 1256   BP: 138/79   Pulse: 88   Resp: 15   Temp: 98.4 °F (36.9 °C)   TempSrc: Temporal   SpO2: 99%   Weight: 166 lb 3.2 oz (75.4 kg)   Height: 5' 1.5\" (1.562 m)   PainSc:   0 - No pain       Hearing/Vision:   No exam data present    Learning Assessment:     Learning Assessment 2019   PRIMARY LEARNER Child(verónica)   PRIMARY LANGUAGE OTHER (COMMENT)   LEARNER PREFERENCE PRIMARY PICTURES     LISTENING     VIDEOS     DEMONSTRATION   ANSWERED BY daughter   RELATIONSHIP OTHER     Depression Screening:     3 most recent PHQ Screens 2022   Little interest or pleasure in doing things Not at all   Feeling down, depressed, irritable, or hopeless Not at all   Total Score PHQ 2 0     Fall Risk Assessment:     Fall Risk Assessment, last 12 mths 2022   Able to walk? Yes   Fall in past 12 months? 0   Do you feel unsteady? 0   Are you worried about falling 0     Abuse Screening:     Abuse Screening Questionnaire 2022   Do you ever feel afraid of your partner? N   Are you in a relationship with someone who physically or mentally threatens you? N   Is it safe for you to go home?  Y     ADL Assessment:     ADL Assessment 2019   Feeding yourself No Help Needed   Getting from bed to chair No Help Needed   Getting dressed No Help Needed   Bathing or showering No Help Needed   Walk across the room (includes cane/walker) No Help Needed   Using the telphone No Help Needed   Taking your medications No Help Needed   Preparing meals No Help Needed   Managing money (expenses/bills) No Help Needed   Moderately strenuous housework (laundry) No Help Needed   Shopping for personal items (toiletries/medicines) No Help Needed   Shopping for groceries No Help Needed   Driving No Help Needed   Climbing a flight of stairs No Help Needed   Getting to places beyond walking distances No Help Needed        Coordination of Care Questionaire:   1. \"Have you been to the ER, urgent care clinic since your last visit? Hospitalized since your last visit? \" No    2. \"Have you seen or consulted any other health care providers outside of the [address] [place] [address] since your last visit? \" No     3. For patients aged 39-70: Has the patient had a colonoscopy? No     If the patient is female:    4. For patients aged 41-77: Has the patient had a mammogram within the past 2 years? No    5. For patients aged 21-65: Has the patient had a pap smear? NA - based on age    Advanced Directive:   1. Do you have an Advanced Directive? NO    2. Would you like information on Advanced Directives?  NO

## 2022-04-28 NOTE — PROGRESS NOTES
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, also her posture is not the best, she does not sit very supported  No numbness no tingling no weakness    She is accompanied today with her spouse    Past Medical History:   Diagnosis Date    Diabetes ([address]     Hypercholesteremia     Hypertension      Past Surgical History:   Procedure Laterality Date    HX CHOLECYSTECTOMY       Social History     Tobacco Use    Smoking status: Never Smoker    Smokeless tobacco: Never Used   Substance Use Topics    Alcohol use: No       Family History   Problem Relation Age of Onset    Diabetes Mother     Hypertension Mother        Review of Systems   Constitutional: Negative for chills, fever, malaise/fatigue and weight loss. HENT: Negative for congestion, ear discharge, ear pain, hearing loss and nosebleeds. Eyes: Negative for blurred vision, double vision and discharge. Respiratory: Negative for cough. Cardiovascular: Negative for chest pain, palpitations, claudication and leg swelling. Gastrointestinal: Negative for abdominal pain, constipation, diarrhea, nausea and vomiting. Genitourinary: Negative for dysuria, frequency and urgency. Musculoskeletal: Positive for back pain and myalgias. Skin: Negative for itching and rash. Neurological: Negative for dizziness, tingling, sensory change, speech change, focal weakness, weakness and headaches. Physical Exam  Vitals and nursing note reviewed. Exam conducted with a chaperone present. Constitutional:       General: She is not in acute distress. Appearance: She is well-developed. She is not diaphoretic. HENT:      Head: Normocephalic and atraumatic. Eyes:      General: No scleral icterus. Right eye: No discharge. Left eye: No discharge. Conjunctiva/sclera: Conjunctivae normal.   Neck:      Thyroid: No thyromegaly. Cardiovascular:      Rate and Rhythm: Normal rate and regular rhythm. Heart sounds: Normal heart sounds. Pulmonary:      Effort: Pulmonary effort is normal. No respiratory distress. Breath sounds: Normal breath sounds. No wheezing or rales. Chest:      Chest wall: No tenderness. Abdominal:      General: There is no distension. Palpations: Abdomen is soft. Tenderness: There is no abdominal tenderness. There is no right CVA tenderness, guarding or rebound. Musculoskeletal:         General: Tenderness present. No deformity. Normal range of motion. Comments:   She has muscular tenderness under the left scapula and around the scapula   Lymphadenopathy:      Cervical: No cervical adenopathy. Skin:     General: Skin is warm and dry. Coloration: Skin is not pale. Findings: No erythema or rash. Neurological:      General: No focal deficit present. Mental Status: She is alert and oriented to person, place, and time. Cranial Nerves: No cranial nerve deficit. Coordination: Coordination normal.   Psychiatric:         Mood and Affect: Mood normal.         Behavior: Behavior normal.         Thought Content: Thought content normal.         Judgment: Judgment normal.          Assessment and plan     Plan of care has been discussed with the patient, he agrees to the plan and verbalized understanding.   All his questions were answered  More than 50% of the time spent in this visit was counseling the patient about illness and treatment options         1. Muscle spasm    I have discussed with her that Flexeril can make her sleepy she is okay with that because she does not sleep well  - cyclobenzaprine (FLEXERIL) 5 mg tablet; Take 1 Tablet by mouth nightly for 30 days. As needed  Dispense: 30 Tablet; Refill: 0  - REFERRAL TO PHYSICAL THERAPY    2. Left-sided thoracic back pain, unspecified chronicity  Advised about stretches that handed to the patient and also will be referred to physical therapy    Patient also can try chiropractor  - cyclobenzaprine (FLEXERIL) 5 mg tablet; Take 1 Tablet by mouth nightly for 30 days. As needed  Dispense: 30 Tablet; Refill: 0  - REFERRAL TO PHYSICAL THERAPY    Essential hypertension  Blood pressures well controlled need medication refill today        Current Outpatient Medications   Medication Sig Dispense Refill    cyclobenzaprine (FLEXERIL) 5 mg tablet Take 1 Tablet by mouth nightly for 30 days. As needed 30 Tablet 0    lisinopril-hydroCHLOROthiazide (PRINZIDE, ZESTORETIC) 20-12.5 mg per tablet Take 1 Tablet by mouth daily. 90 Tablet 1    zolpidem (AMBIEN) 5 mg tablet Take 1 Tablet by mouth nightly as needed for Sleep for up to 30 days. Max Daily Amount: 5 mg. 30 Tablet 0    Restasis 0.05 % dpet       metFORMIN (GLUCOPHAGE) 1,000 mg tablet Take 1 Tablet by mouth two (2) times daily (with meals). 180 Tablet 3    tretinoin (RETIN-A) 0.025 % topical cream Use  At night daily 45 g 0    calcium-cholecalciferol, D3, (CALTRATE 600+D) tablet Take 1 Tab by mouth daily.  omega-3 acid ethyl esters (LOVAZA) 1 gram capsule Take 2 g by mouth two (2) times a day.  cholecalciferol (Vitamin D3) 25 mcg (1,000 unit) cap Take 4,000 Units by mouth daily.  aspirin delayed-release 81 mg tablet Take 81 mg by mouth daily.          Patient Active Problem List    Diagnosis Date Noted    Controlled type 2 diabetes mellitus with complication, without long-term current use of insulin ([facility] 07/02/2019  DDD (degenerative disc disease), cervical 08/27/2018    Essential hypertension 08/27/2018    Mixed hyperlipidemia 08/27/2018    Osteoporosis 08/27/2018    Irritable bowel syndrome with both constipation and diarrhea 08/27/2018    Osteoarthritis of both knees 08/27/2018    Primary osteoarthritis of right knee 01/13/2017     Results for orders placed or performed in visit on 05/01/26   METABOLIC PANEL, COMPREHENSIVE   Result Value Ref Range    Glucose 192 (H) 65 - 99 mg/dL    BUN 16 8 - 27 mg/dL    Creatinine 0.74 0.57 - 1.00 mg/dL    GFR est non-AA 80 >59 mL/min/1.73    GFR est AA 92 >59 mL/min/1.73    BUN/Creatinine ratio 22 12 - 28    Sodium 137 134 - 144 mmol/L    Potassium 4.6 3.5 - 5.2 mmol/L    Chloride 98 96 - 106 mmol/L    CO2 17 (L) 20 - 29 mmol/L    Calcium 10.1 8.7 - 10.3 mg/dL    Protein, total 7.1 6.0 - 8.5 g/dL    Albumin 3.7 3.7 - 4.7 g/dL    GLOBULIN, TOTAL 3.4 1.5 - 4.5 g/dL    A-G Ratio 1.1 (L) 1.2 - 2.2    Bilirubin, total 0.4 0.0 - 1.2 mg/dL    Alk. phosphatase 129 (H) 44 - 121 IU/L    AST (SGOT) 31 0 - 40 IU/L    ALT (SGPT) 26 0 - 32 IU/L   CBC WITH AUTOMATED DIFF   Result Value Ref Range    WBC 13.2 (H) 3.4 - 10.8 x10E3/uL    RBC 4.36 3.77 - 5.28 x10E6/uL    HGB 11.8 11.1 - 15.9 g/dL    HCT 36.7 34.0 - 46.6 %    MCV 84 79 - 97 fL    MCH 27.1 26.6 - 33.0 pg    MCHC 32.2 31.5 - 35.7 g/dL    RDW 13.6 11.7 - 15.4 %    PLATELET 269 951 - 233 x10E3/uL    NEUTROPHILS 59 Not Estab. %    Lymphocytes 28 Not Estab. %    MONOCYTES 5 Not Estab. %    EOSINOPHILS 1 Not Estab. %    BASOPHILS 1 Not Estab. %    IMMATURE CELLS Note     ABS. NEUTROPHILS 7.9 (H) 1.4 - 7.0 x10E3/uL    Abs Lymphocytes 3.7 (H) 0.7 - 3.1 x10E3/uL    ABS. MONOCYTES 0.7 0.1 - 0.9 x10E3/uL    ABS. EOSINOPHILS 0.1 0.0 - 0.4 x10E3/uL    ABS.  BASOPHILS 0.1 0.0 - 0.2 x10E3/uL    Hematology comments: Note:    IMMATURE CELLS REFLEX   Result Value Ref Range    BANDS 1 Not Estab. %    Metamyelocytes, 437441 5 (H) 0 - 0 %     Appointment on 02/24/2022   Component Date Value Ref Range Status    Glucose 02/24/2022 192* 65 - 99 mg/dL Final    BUN 02/24/2022 16  8 - 27 mg/dL Final    Creatinine 02/24/2022 0.74  0.57 - 1.00 mg/dL Final    Comment:                **Effective February 28, 2022 [facility] [place] will begin**                   reporting the 2021 CKD-EPI creatinine equation that                   estimates kidney function without a race variable.  GFR est non-AA 02/24/2022 80  >59 mL/min/1.73 Final    GFR est AA 02/24/2022 92  >59 mL/min/1.73 Final    Comment: **In accordance with recommendations from the NKF-ASN Task force,**    [facility] is in the process of updating its eGFR calculation to the    2021 CKD-EPI creatinine equation that estimates kidney function    without a race variable.  BUN/Creatinine ratio 02/24/2022 22  12 - 28 Final    Sodium 02/24/2022 137  134 - 144 mmol/L Final    Potassium 02/24/2022 4.6  3.5 - 5.2 mmol/L Final    Chloride 02/24/2022 98  96 - 106 mmol/L Final    CO2 02/24/2022 17* 20 - 29 mmol/L Final    Calcium 02/24/2022 10.1  8.7 - 10.3 mg/dL Final    Protein, total 02/24/2022 7.1  6.0 - 8.5 g/dL Final    Albumin 02/24/2022 3.7  3.7 - 4.7 g/dL Final    GLOBULIN, TOTAL 02/24/2022 3.4  1.5 - 4.5 g/dL Final    A-G Ratio 02/24/2022 1.1* 1.2 - 2.2 Final    Bilirubin, total 02/24/2022 0.4  0.0 - 1.2 mg/dL Final    Alk.  phosphatase 02/24/2022 129* 44 - 121 IU/L Final    AST (SGOT) 02/24/2022 31  0 - 40 IU/L Final    ALT (SGPT) 02/24/2022 26  0 - 32 IU/L Final    WBC 02/24/2022 13.2* 3.4 - 10.8 x10E3/uL Final    RBC 02/24/2022 4.36  3.77 - 5.28 x10E6/uL Final    HGB 02/24/2022 11.8  11.1 - 15.9 g/dL Final    HCT 02/24/2022 36.7  34.0 - 46.6 % Final    MCV 02/24/2022 84  79 - 97 fL Final    MCH 02/24/2022 27.1  26.6 - 33.0 pg Final    MCHC 02/24/2022 32.2  31.5 - 35.7 g/dL Final    RDW 02/24/2022 13.6  11.7 - 15.4 % Final    PLATELET 29/97/4196 566  150 - 450 x10E3/uL Final    NEUTROPHILS 02/24/2022 59  Not Estab. % Final    Lymphocytes 02/24/2022 28  Not Estab. % Final    MONOCYTES 02/24/2022 5  Not Estab. % Final    EOSINOPHILS 02/24/2022 1  Not Estab. % Final    BASOPHILS 02/24/2022 1  Not Estab. % Final    IMMATURE CELLS 02/24/2022 Note   Final    ABS. NEUTROPHILS 02/24/2022 7.9* 1.4 - 7.0 x10E3/uL Final    Abs Lymphocytes 02/24/2022 3.7* 0.7 - 3.1 x10E3/uL Final    ABS. MONOCYTES 02/24/2022 0.7  0.1 - 0.9 x10E3/uL Final    ABS. EOSINOPHILS 02/24/2022 0.1  0.0 - 0.4 x10E3/uL Final    ABS. BASOPHILS 02/24/2022 0.1  0.0 - 0.2 x10E3/uL Final    Hematology comments: 02/24/2022 Note:   Final    Manual differential was performed.  BANDS 02/24/2022 1  Not Estab. % Final    Metamyelocytes, 205252 02/24/2022 5* 0 - 0 % Final   Office Visit on 02/22/2022   Component Date Value Ref Range Status    Color (UA POC) 02/22/2022 [place]   Final    Clarity (UA POC) 02/22/2022 Cloudy   Final    Glucose (UA POC) 02/22/2022 Negative  Negative Final    Bilirubin (UA POC) 02/22/2022 1+  Negative Final    Ketones (UA POC) 02/22/2022 Trace  Negative Final    Specific gravity (UA POC) 02/22/2022 1.015  1.001 - 1.035 Final    Blood (UA POC) 02/22/2022 3+  Negative Final    pH (UA POC) 02/22/2022 5.0  4.6 - 8.0 Final    Protein (UA POC) 02/22/2022 2+  Negative Final    Urobilinogen (UA POC) 02/22/2022 1 mg/dL  0.2 - 1 Final    Nitrites (UA POC) 02/22/2022 Positive  Negative Final    Leukocyte esterase (UA POC) 02/22/2022 3+  Negative Final    Glucose 02/22/2022 237* 65 - 99 mg/dL Final    BUN 02/22/2022 32* 8 - 27 mg/dL Final    Creatinine 02/22/2022 1.00  0.57 - 1.00 mg/dL Final    Comment:                **Effective February 28, 2022 [facility] will begin**                   reporting the 2021 CKD-EPI creatinine equation that                   estimates kidney function without a race variable.       GFR est non-AA 02/22/2022 56* >59 mL/min/1.73 Final    GFR est AA 02/22/2022 64  >59 mL/min/1.73 Final    Comment: **In accordance with recommendations from the NKF-ASN Task force,**    [facility] is in the process of updating its eGFR calculation to the    2021 CKD-EPI creatinine equation that estimates kidney function    without a race variable.  BUN/Creatinine ratio 02/22/2022 32* 12 - 28 Final    Sodium 02/22/2022 129* 134 - 144 mmol/L Final    Potassium 02/22/2022 4.5  3.5 - 5.2 mmol/L Final    Chloride 02/22/2022 89* 96 - 106 mmol/L Final    CO2 02/22/2022 21  20 - 29 mmol/L Final    Calcium 02/22/2022 9.4  8.7 - 10.3 mg/dL Final    Protein, total 02/22/2022 6.2  6.0 - 8.5 g/dL Final    Albumin 02/22/2022 3.4* 3.7 - 4.7 g/dL Final    GLOBULIN, TOTAL 02/22/2022 2.8  1.5 - 4.5 g/dL Final    A-G Ratio 02/22/2022 1.2  1.2 - 2.2 Final    Bilirubin, total 02/22/2022 0.8  0.0 - 1.2 mg/dL Final    Alk. phosphatase 02/22/2022 135* 44 - 121 IU/L Final    AST (SGOT) 02/22/2022 23  0 - 40 IU/L Final    ALT (SGPT) 02/22/2022 27  0 - 32 IU/L Final    WBC 02/22/2022 18.0* 3.4 - 10.8 x10E3/uL Final    RBC 02/22/2022 4.11  3.77 - 5.28 x10E6/uL Final    HGB 02/22/2022 11.2  11.1 - 15.9 g/dL Final    HCT 02/22/2022 33.1* 34.0 - 46.6 % Final    MCV 02/22/2022 81  79 - 97 fL Final    MCH 02/22/2022 27.3  26.6 - 33.0 pg Final    MCHC 02/22/2022 33.8  31.5 - 35.7 g/dL Final    RDW 02/22/2022 13.6  11.7 - 15.4 % Final    PLATELET 71/61/6581 001  150 - 450 x10E3/uL Final    NEUTROPHILS 02/22/2022 81  Not Estab. % Final    Lymphocytes 02/22/2022 8  Not Estab. % Final    MONOCYTES 02/22/2022 9  Not Estab. % Final    EOSINOPHILS 02/22/2022 0  Not Estab. % Final    BASOPHILS 02/22/2022 0  Not Estab. % Final    ABS. NEUTROPHILS 02/22/2022 14.5* 1.4 - 7.0 x10E3/uL Final    Abs Lymphocytes 02/22/2022 1.4  0.7 - 3.1 x10E3/uL Final    ABS. MONOCYTES 02/22/2022 1.6* 0.1 - 0.9 x10E3/uL Final    ABS. EOSINOPHILS 02/22/2022 0.0  0.0 - 0.4 x10E3/uL Final    ABS.  BASOPHILS 02/22/2022 0.1  0.0 - 0.2 x10E3/uL Final    IMMATURE GRANULOCYTES 02/22/2022 2  Not Estab. % Final    ABS. IMM. GRANS. 02/22/2022 0.3* 0.0 - 0.1 x10E3/uL Final    Comment: (An elevated percentage of Immature Granulocytes has not been found  to be clinically significant as a sole clinical predictor of disease. Does NOT include bands or blast cells. Pregnancy associated  physiological leukocytosis may also show increased immature  granulocytes without clinical significance.)      Urine Culture, Routine 02/21/2022 *  Final                    Value:Escherichia coli  Cefazolin <=4 ug/mL      Comment: Cefazolin with an ASHWIN <=16 predicts susceptibility to the oral agents  cefaclor, cefdinir, cefpodoxime, cefprozil, cefuroxime, cephalexin,  and loracarbef when used for therapy of uncomplicated urinary tract  infections due to E. coli, Klebsiella pneumoniae, and Proteus  mirabilis. Greater than 100,000 colony forming units per mL      Urine Culture, Routine 02/21/2022    Final                    Value:Mixed urogenital lu  10,000-25,000 colony forming units per mL      Hemoglobin A1c 02/22/2022 7.4* 4.8 - 5.6 % Final    Comment:          Prediabetes: 5.7 - 6.4           Diabetes: >6.4           Glycemic control for adults with diabetes: <7.0      Interpretation 02/22/2022 Note   Final    Supplemental report is available. Follow-up and Dispositions    · Return in about 3 months (around 7/28/2022), or if symptoms worsen or fail to improve.

## 2022-08-04 ENCOUNTER — TELEPHONE (OUTPATIENT)
Dept: FAMILY MEDICINE CLINIC | Age: 72
End: 2022-08-04

## 2022-08-04 DIAGNOSIS — H92.12 DISCHARGE OF LEFT EAR PRESENT: Primary | ICD-10-CM

## 2022-08-04 DIAGNOSIS — H92.02 PAIN IN LEFT EAR: Primary | ICD-10-CM

## 2022-08-04 RX ORDER — CIPROFLOXACIN AND DEXAMETHASONE 3; 1 MG/ML; MG/ML
4 SUSPENSION/ DROPS AURICULAR (OTIC) 2 TIMES DAILY
Qty: 7.5 ML | Refills: 0 | Status: SHIPPED | OUTPATIENT
Start: 2022-08-04 | End: 2022-08-11

## 2022-08-04 NOTE — TELEPHONE ENCOUNTER
RX sent as requested for   ear pain H/O ear infection   Daughter Judd Roblero requested drops her mom is in pain

## 2022-11-21 ENCOUNTER — TELEPHONE (OUTPATIENT)
Dept: FAMILY MEDICINE CLINIC | Age: 72
End: 2022-11-21

## 2022-11-21 NOTE — TELEPHONE ENCOUNTER
Please schedule her for CPE first available ok to use same day !!   Need to get labs done one week prior

## 2022-12-09 ENCOUNTER — TELEPHONE (OUTPATIENT)
Dept: FAMILY MEDICINE CLINIC | Age: 72
End: 2022-12-09

## 2022-12-09 DIAGNOSIS — E55.9 VITAMIN D DEFICIENCY: ICD-10-CM

## 2022-12-09 DIAGNOSIS — M81.0 OSTEOPOROSIS, UNSPECIFIED OSTEOPOROSIS TYPE, UNSPECIFIED PATHOLOGICAL FRACTURE PRESENCE: ICD-10-CM

## 2022-12-09 DIAGNOSIS — E11.8 CONTROLLED TYPE 2 DIABETES MELLITUS WITH COMPLICATION, WITHOUT LONG-TERM CURRENT USE OF INSULIN (HCC): ICD-10-CM

## 2022-12-09 DIAGNOSIS — I10 ESSENTIAL HYPERTENSION: ICD-10-CM

## 2022-12-09 DIAGNOSIS — Z00.00 ANNUAL PHYSICAL EXAM: Primary | ICD-10-CM

## 2022-12-09 NOTE — TELEPHONE ENCOUNTER
----- Message from [address] sent at 12/9/2022  2:25 PM EST -----  Subject: Message to Provider    QUESTIONS  Information for Provider? Felicia Mack has an awv 1/10/2023 and would like labs   done prior. Please put order in. Thank you   ---------------------------------------------------------------------------  --------------  [place] MUSE  [phone]; OK to leave message on voicemail  ---------------------------------------------------------------------------  --------------  SCRIPT ANSWERS  Relationship to Patient? Other  Representative Name? Lc Holloway - daughter  Is the Representative on the appropriate HIPAA document in Epic?  Yes Normal for race

## 2022-12-16 DIAGNOSIS — I10 ESSENTIAL HYPERTENSION: ICD-10-CM

## 2022-12-16 DIAGNOSIS — E11.8 CONTROLLED TYPE 2 DIABETES MELLITUS WITH COMPLICATION, WITHOUT LONG-TERM CURRENT USE OF INSULIN (HCC): ICD-10-CM

## 2022-12-18 RX ORDER — LISINOPRIL AND HYDROCHLOROTHIAZIDE 12.5; 2 MG/1; MG/1
TABLET ORAL
Qty: 90 TABLET | Refills: 0 | Status: SHIPPED | OUTPATIENT
Start: 2022-12-18

## 2023-01-03 ENCOUNTER — APPOINTMENT (OUTPATIENT)
Dept: FAMILY MEDICINE CLINIC | Age: 73
End: 2023-01-03

## 2023-01-03 DIAGNOSIS — I10 ESSENTIAL HYPERTENSION: ICD-10-CM

## 2023-01-03 DIAGNOSIS — M81.0 OSTEOPOROSIS, UNSPECIFIED OSTEOPOROSIS TYPE, UNSPECIFIED PATHOLOGICAL FRACTURE PRESENCE: ICD-10-CM

## 2023-01-03 DIAGNOSIS — E55.9 VITAMIN D DEFICIENCY: ICD-10-CM

## 2023-01-03 DIAGNOSIS — Z00.00 ANNUAL PHYSICAL EXAM: ICD-10-CM

## 2023-01-03 DIAGNOSIS — E11.8 CONTROLLED TYPE 2 DIABETES MELLITUS WITH COMPLICATION, WITHOUT LONG-TERM CURRENT USE OF INSULIN (HCC): ICD-10-CM

## 2023-01-04 LAB
25(OH)D3+25(OH)D2 SERPL-MCNC: 106 NG/ML (ref 30–100)
ALBUMIN SERPL-MCNC: 4.5 G/DL (ref 3.7–4.7)
ALBUMIN/GLOB SERPL: 1.8 {RATIO} (ref 1.2–2.2)
ALP SERPL-CCNC: 84 IU/L (ref 44–121)
ALT SERPL-CCNC: 9 IU/L (ref 0–32)
AST SERPL-CCNC: 18 IU/L (ref 0–40)
BASOPHILS # BLD AUTO: 0.1 X10E3/UL (ref 0–0.2)
BASOPHILS NFR BLD AUTO: 1 %
BILIRUB SERPL-MCNC: 0.3 MG/DL (ref 0–1.2)
BUN SERPL-MCNC: 19 MG/DL (ref 8–27)
BUN/CREAT SERPL: 32 (ref 12–28)
CALCIUM SERPL-MCNC: 10.2 MG/DL (ref 8.7–10.3)
CHLORIDE SERPL-SCNC: 101 MMOL/L (ref 96–106)
CHOLEST SERPL-MCNC: 212 MG/DL (ref 100–199)
CO2 SERPL-SCNC: 26 MMOL/L (ref 20–29)
CREAT SERPL-MCNC: 0.59 MG/DL (ref 0.57–1)
EGFR: 94 ML/MIN/1.73
EOSINOPHIL # BLD AUTO: 0.1 X10E3/UL (ref 0–0.4)
EOSINOPHIL NFR BLD AUTO: 1 %
ERYTHROCYTE [DISTWIDTH] IN BLOOD BY AUTOMATED COUNT: 14 % (ref 11.7–15.4)
GLOBULIN SER CALC-MCNC: 2.5 G/DL (ref 1.5–4.5)
GLUCOSE SERPL-MCNC: 123 MG/DL (ref 70–99)
HBA1C MFR BLD: 6.5 % (ref 4.8–5.6)
HCT VFR BLD AUTO: 39.6 % (ref 34–46.6)
HDLC SERPL-MCNC: 69 MG/DL
HGB BLD-MCNC: 13.4 G/DL (ref 11.1–15.9)
IMM GRANULOCYTES # BLD AUTO: 0 X10E3/UL (ref 0–0.1)
IMM GRANULOCYTES NFR BLD AUTO: 0 %
IMP & REVIEW OF LAB RESULTS: NORMAL
LDLC SERPL CALC-MCNC: 129 MG/DL (ref 0–99)
LYMPHOCYTES # BLD AUTO: 3.5 X10E3/UL (ref 0.7–3.1)
LYMPHOCYTES NFR BLD AUTO: 52 %
MCH RBC QN AUTO: 27.7 PG (ref 26.6–33)
MCHC RBC AUTO-ENTMCNC: 33.8 G/DL (ref 31.5–35.7)
MCV RBC AUTO: 82 FL (ref 79–97)
MONOCYTES # BLD AUTO: 0.6 X10E3/UL (ref 0.1–0.9)
MONOCYTES NFR BLD AUTO: 10 %
NEUTROPHILS # BLD AUTO: 2.4 X10E3/UL (ref 1.4–7)
NEUTROPHILS NFR BLD AUTO: 36 %
PLATELET # BLD AUTO: 206 X10E3/UL (ref 150–450)
POTASSIUM SERPL-SCNC: 4.6 MMOL/L (ref 3.5–5.2)
PROT SERPL-MCNC: 7 G/DL (ref 6–8.5)
RBC # BLD AUTO: 4.83 X10E6/UL (ref 3.77–5.28)
SODIUM SERPL-SCNC: 140 MMOL/L (ref 134–144)
TRIGL SERPL-MCNC: 77 MG/DL (ref 0–149)
VLDLC SERPL CALC-MCNC: 14 MG/DL (ref 5–40)
WBC # BLD AUTO: 6.6 X10E3/UL (ref 3.4–10.8)

## 2023-01-10 ENCOUNTER — OFFICE VISIT (OUTPATIENT)
Dept: FAMILY MEDICINE CLINIC | Age: 73
End: 2023-01-10
Payer: MEDICAID

## 2023-01-10 ENCOUNTER — TELEPHONE (OUTPATIENT)
Dept: FAMILY MEDICINE CLINIC | Age: 73
End: 2023-01-10

## 2023-01-10 VITALS
HEART RATE: 73 BPM | HEIGHT: 62 IN | TEMPERATURE: 97.2 F | RESPIRATION RATE: 15 BRPM | BODY MASS INDEX: 29 KG/M2 | DIASTOLIC BLOOD PRESSURE: 82 MMHG | OXYGEN SATURATION: 99 % | SYSTOLIC BLOOD PRESSURE: 138 MMHG | WEIGHT: 157.6 LBS

## 2023-01-10 DIAGNOSIS — M25.552 BILATERAL HIP PAIN: ICD-10-CM

## 2023-01-10 DIAGNOSIS — M81.0 OSTEOPOROSIS, UNSPECIFIED OSTEOPOROSIS TYPE, UNSPECIFIED PATHOLOGICAL FRACTURE PRESENCE: ICD-10-CM

## 2023-01-10 DIAGNOSIS — Z12.11 COLON CANCER SCREENING: ICD-10-CM

## 2023-01-10 DIAGNOSIS — Z12.39 ENCOUNTER FOR SCREENING FOR MALIGNANT NEOPLASM OF BREAST, UNSPECIFIED SCREENING MODALITY: ICD-10-CM

## 2023-01-10 DIAGNOSIS — E55.9 VITAMIN D DEFICIENCY: ICD-10-CM

## 2023-01-10 DIAGNOSIS — Z97.4 WEARS HEARING AID: ICD-10-CM

## 2023-01-10 DIAGNOSIS — M25.512 CHRONIC LEFT SHOULDER PAIN: ICD-10-CM

## 2023-01-10 DIAGNOSIS — R14.3 FLATULENCE: ICD-10-CM

## 2023-01-10 DIAGNOSIS — I10 ESSENTIAL HYPERTENSION: ICD-10-CM

## 2023-01-10 DIAGNOSIS — Z00.00 ANNUAL PHYSICAL EXAM: Primary | ICD-10-CM

## 2023-01-10 DIAGNOSIS — G47.00 INSOMNIA, UNSPECIFIED TYPE: ICD-10-CM

## 2023-01-10 DIAGNOSIS — M25.551 BILATERAL HIP PAIN: ICD-10-CM

## 2023-01-10 DIAGNOSIS — Z23 NEEDS FLU SHOT: ICD-10-CM

## 2023-01-10 DIAGNOSIS — R06.02 SOB (SHORTNESS OF BREATH): ICD-10-CM

## 2023-01-10 DIAGNOSIS — E11.8 CONTROLLED TYPE 2 DIABETES MELLITUS WITH COMPLICATION, WITHOUT LONG-TERM CURRENT USE OF INSULIN (HCC): ICD-10-CM

## 2023-01-10 DIAGNOSIS — G89.29 CHRONIC LEFT SHOULDER PAIN: ICD-10-CM

## 2023-01-10 DIAGNOSIS — M25.472 LEFT ANKLE SWELLING: ICD-10-CM

## 2023-01-10 PROCEDURE — 3078F DIAST BP <80 MM HG: CPT | Performed by: LEGAL MEDICINE

## 2023-01-10 PROCEDURE — 99397 PER PM REEVAL EST PAT 65+ YR: CPT | Performed by: LEGAL MEDICINE

## 2023-01-10 PROCEDURE — 99214 OFFICE O/P EST MOD 30 MIN: CPT | Performed by: LEGAL MEDICINE

## 2023-01-10 PROCEDURE — 3074F SYST BP LT 130 MM HG: CPT | Performed by: LEGAL MEDICINE

## 2023-01-10 RX ORDER — LEMBOREXANT 5 MG/1
5 TABLET, FILM COATED ORAL
Qty: 30 EACH | Refills: 5 | Status: SHIPPED | OUTPATIENT
Start: 2023-01-10 | End: 2023-02-09

## 2023-01-10 RX ORDER — ATORVASTATIN CALCIUM 10 MG/1
10 TABLET, FILM COATED ORAL DAILY
Qty: 90 TABLET | Refills: 3 | Status: SHIPPED | OUTPATIENT
Start: 2023-01-10 | End: 2023-04-10

## 2023-01-10 RX ORDER — METFORMIN HYDROCHLORIDE 1000 MG/1
1000 TABLET ORAL 2 TIMES DAILY WITH MEALS
Qty: 180 TABLET | Refills: 3 | Status: SHIPPED | OUTPATIENT
Start: 2023-01-10

## 2023-01-10 NOTE — PROGRESS NOTES
Rey Augustine is a 74 y.o. female (: 1950) presenting to address:    Chief Complaint   Patient presents with    Physical       Vitals:    01/10/23 1045   BP: 138/82   Pulse: 73   Resp: 15   Temp: 97.2 °F (36.2 °C)   TempSrc: Temporal   SpO2: 99%   Weight: 157 lb 9.6 oz (71.5 kg)   Height: 5' 1.5\" (1.562 m)   PainSc:   7   PainLoc: Back       Hearing/Vision:   No results found. Learning Assessment:     Learning Assessment 2019   PRIMARY LEARNER Child(verónica)   PRIMARY LANGUAGE OTHER (COMMENT)   LEARNER PREFERENCE PRIMARY PICTURES     LISTENING     VIDEOS     DEMONSTRATION   ANSWERED BY daughter   RELATIONSHIP OTHER     Depression Screening:     3 most recent PHQ Screens 2022   Little interest or pleasure in doing things Not at all   Feeling down, depressed, irritable, or hopeless Not at all   Total Score PHQ 2 0     Fall Risk Assessment:     Fall Risk Assessment, last 12 mths 1/10/2023   Able to walk? Yes   Fall in past 12 months? 0   Do you feel unsteady? 0   Are you worried about falling 0     Abuse Screening:     Abuse Screening Questionnaire 2022   Do you ever feel afraid of your partner? N   Are you in a relationship with someone who physically or mentally threatens you? N   Is it safe for you to go home?  Y     ADL Assessment:     ADL Assessment 2019   Feeding yourself No Help Needed   Getting from bed to chair No Help Needed   Getting dressed No Help Needed   Bathing or showering No Help Needed   Walk across the room (includes cane/walker) No Help Needed   Using the telphone No Help Needed   Taking your medications No Help Needed   Preparing meals No Help Needed   Managing money (expenses/bills) No Help Needed   Moderately strenuous housework (laundry) No Help Needed   Shopping for personal items (toiletries/medicines) No Help Needed   Shopping for groceries No Help Needed   Driving No Help Needed   Climbing a flight of stairs No Help Needed   Getting to places beyond walking distances No Help Needed        Coordination of Care Questionaire:   1. \"Have you been to the ER, urgent care clinic since your last visit? Hospitalized since your last visit? \" No    2. \"Have you seen or consulted any other health care providers outside of the [address] [place] [address] since your last visit? \"  ENT      3. For patients aged 39-70: Has the patient had a colonoscopy / FIT/ Cologuard? No    If the patient is female:    4. For patients aged 41-77: Has the patient had a mammogram within the past 2 years? No  See top three    5. For patients aged 21-65: Has the patient had a pap smear? NA - based on age or sex    Advanced Directive:   1. Do you have an Advanced Directive? NO    2. Would you like information on Advanced Directives? NO    Immunization/s of high dose flu vaccine was administered on 1/10/2023 and documented on 1/11/2023 by Leigh Schaumann, LPN with guardian's consent. Patient tolerated procedure well. No reactions noted.

## 2023-01-10 NOTE — PROGRESS NOTES
Jag Cuevas     Chief Complaint   Patient presents with    Physical     Vitals:    01/10/23 1045   BP: 138/82   Pulse: 73   Resp: 15   Temp: 97.2 °F (36.2 °C)   TempSrc: Temporal   SpO2: 99%   Weight: 157 lb 9.6 oz (71.5 kg)   Height: 5' 1.5\" (1.562 m)   PainSc:   7   PainLoc: Back         HPI:  [facility] is here for follow up accompanied with her son . She has enrico having left shoulder pain possible muscular pain no H/O trauma or fall or heavy lifting , she hidalgo have DJD of cervical spine , no pain radiating along her arm no numbness or tingling . it has been going  on for few month       Has been having ankle swelling with no pain it is more with sitting and standing and less with wakening up in the morning     Has abdominal bloating and gas frequently not sure which food make it worse   Has been with fatigue  , shortness of breath with mild to moderate  extortion, fatigue and lack of energy with decreased  appetite      Has pain in both legs especially with movement     Past Medical History:   Diagnosis Date    Diabetes ([address]     Hypercholesteremia     Hypertension      Past Surgical History:   Procedure Laterality Date    HX CHOLECYSTECTOMY       Social History     Tobacco Use    Smoking status: Never    Smokeless tobacco: Never   Substance Use Topics    Alcohol use: No       Family History   Problem Relation Age of Onset    Diabetes Mother     Hypertension Mother        Review of Systems   Constitutional:  Negative for chills, fever, malaise/fatigue and weight loss. HENT:  Negative for hearing loss and sore throat. Eyes:  Negative for blurred vision, double vision, photophobia, pain and discharge. Respiratory:  Positive for shortness of breath. Negative for cough, hemoptysis, sputum production and wheezing. Cardiovascular:  Positive for palpitations and leg swelling. Negative for chest pain, orthopnea, claudication and PND. Gastrointestinal:  Negative for nausea.    Genitourinary:  Negative for dysuria, frequency and urgency. Musculoskeletal:  Positive for myalgias. Negative for back pain and neck pain. Neurological:  Negative for dizziness, tingling and headaches. Psychiatric/Behavioral:  Negative for depression and suicidal ideas. Physical Exam  Vitals and nursing note reviewed. Constitutional:       General: She is not in acute distress. Appearance: She is well-developed. She is not diaphoretic. HENT:      Head: Normocephalic and atraumatic. Mouth/Throat:      Pharynx: No oropharyngeal exudate. Eyes:      General: No scleral icterus. Right eye: No discharge. Left eye: No discharge. Conjunctiva/sclera: Conjunctivae normal.      Pupils: Pupils are equal, round, and reactive to light. Neck:      Thyroid: No thyromegaly. Cardiovascular:      Rate and Rhythm: Normal rate and regular rhythm. Heart sounds: Normal heart sounds. No murmur heard. Pulmonary:      Effort: Pulmonary effort is normal. No respiratory distress. Breath sounds: Normal breath sounds. No wheezing or rales. Chest:      Chest wall: No tenderness. Abdominal:      General: There is no distension. Palpations: Abdomen is soft. Tenderness: There is no abdominal tenderness. There is no rebound. Musculoskeletal:         General: No tenderness or deformity. Normal range of motion. Right lower leg: No edema. Left lower leg: Edema present. Comments: Left ankle swelling    Lymphadenopathy:      Cervical: No cervical adenopathy. Skin:     General: Skin is warm and dry. Coloration: Skin is not pale. Findings: No erythema or rash. Neurological:      Mental Status: She is alert and oriented to person, place, and time. Cranial Nerves: No cranial nerve deficit. Coordination: Coordination normal.   Psychiatric:         Behavior: Behavior normal.         Thought Content:  Thought content normal.         Judgment: Judgment normal.        Assessment and plan Plan of care has been discussed with the patient, he agrees to the plan and verbalized understanding. All his questions were answered  More than 50% of the time spent in this visit was counseling the patient about  illness and treatment options         1. Controlled type 2 diabetes mellitus with complication, without long-term current use of insulin (HCC)  Well controlled HB a1c is 6.5       2. Essential hypertension  Blood pressure is well controlled on lisinopril       3. Vitamin D deficiency  Vitamin D is elevated advised to stop vitamin D for few 6 month then to start 1000 unit in 6 month     6. Chronic left shoulder pain  Pain in the shoulder and scapula    - REFERRAL TO PHYSICAL THERAPY    7. Bilateral hip pain  Possible OA   - XR HIP RT W OR WO PELV 2-3 VWS; Future  - XR HIP LT W OR WO PELV 2-3 VWS; Future    8. SOB (shortness of breath)  Echo for EF evaluation due to SOB,ankle swelling   - ECHO ADULT COMPLETE; Future  12. Flatulence  Advised to try to avoid dairy for 1 week   Using Beano before meals   Also can try probiotics   Mixed Hyperlipidemia   She stopped rosuvastatin due to heart burn   To try lipitor       Current Outpatient Medications   Medication Sig Dispense Refill    lisinopril-hydroCHLOROthiazide (PRINZIDE, ZESTORETIC) 20-12.5 mg per tablet Take 1 tablet by mouth once daily 90 Tablet 0    Restasis 0.05 % dpet       metFORMIN (GLUCOPHAGE) 1,000 mg tablet Take 1 Tablet by mouth two (2) times daily (with meals). 180 Tablet 3    calcium-cholecalciferol, D3, (CALTRATE 600+D) tablet Take 1 Tab by mouth daily. cholecalciferol (VITAMIN D3) 25 mcg (1,000 unit) cap Take 4,000 Units by mouth daily. aspirin delayed-release 81 mg tablet Take 81 mg by mouth daily. tretinoin (RETIN-A) 0.025 % topical cream Use  At night daily (Patient not taking: Reported on 1/10/2023) 45 g 0    omega-3 acid ethyl esters (LOVAZA) 1 gram capsule Take 2 g by mouth two (2) times a day.  (Patient not taking: Reported on 1/10/2023)         Patient Active Problem List    Diagnosis Date Noted    Controlled type 2 diabetes mellitus with complication, without long-term current use of insulin [facility] 07/02/2019    DDD (degenerative disc disease), cervical 08/27/2018    Essential hypertension 08/27/2018    Mixed hyperlipidemia 08/27/2018    Osteoporosis 08/27/2018    Irritable bowel syndrome with both constipation and diarrhea 08/27/2018    Osteoarthritis of both knees 08/27/2018    Primary osteoarthritis of right knee 01/13/2017     Results for orders placed or performed in visit on 01/03/23   VITAMIN D, 25 HYDROXY   Result Value Ref Range    VITAMIN D, 25-HYDROXY 106.0 (H) 30.0 - 100.0 ng/mL   HEMOGLOBIN A1C W/O EAG   Result Value Ref Range    Hemoglobin A1c 6.5 (H) 4.8 - 5.6 %   CBC WITH AUTOMATED DIFF   Result Value Ref Range    WBC 6.6 3.4 - 10.8 x10E3/uL    RBC 4.83 3.77 - 5.28 x10E6/uL    HGB 13.4 11.1 - 15.9 g/dL    HCT 39.6 34.0 - 46.6 %    MCV 82 79 - 97 fL    MCH 27.7 26.6 - 33.0 pg    MCHC 33.8 31.5 - 35.7 g/dL    RDW 14.0 11.7 - 15.4 %    PLATELET 333 172 - 949 x10E3/uL    NEUTROPHILS 36 Not Estab. %    Lymphocytes 52 Not Estab. %    MONOCYTES 10 Not Estab. %    EOSINOPHILS 1 Not Estab. %    BASOPHILS 1 Not Estab. %    ABS. NEUTROPHILS 2.4 1.4 - 7.0 x10E3/uL    Abs Lymphocytes 3.5 (H) 0.7 - 3.1 x10E3/uL    ABS. MONOCYTES 0.6 0.1 - 0.9 x10E3/uL    ABS. EOSINOPHILS 0.1 0.0 - 0.4 x10E3/uL    ABS. BASOPHILS 0.1 0.0 - 0.2 x10E3/uL    IMMATURE GRANULOCYTES 0 Not Estab. %    ABS. IMM.  GRANS. 0.0 0.0 - 0.1 x10E3/uL   LIPID PANEL   Result Value Ref Range    Cholesterol, total 212 (H) 100 - 199 mg/dL    Triglyceride 77 0 - 149 mg/dL    HDL Cholesterol 69 >39 mg/dL    VLDL, calculated 14 5 - 40 mg/dL    LDL, calculated 129 (H) 0 - 99 mg/dL   METABOLIC PANEL, COMPREHENSIVE   Result Value Ref Range    Glucose 123 (H) 70 - 99 mg/dL    BUN 19 8 - 27 mg/dL    Creatinine 0.59 0.57 - 1.00 mg/dL    eGFR 94 >59 mL/min/1.73 BUN/Creatinine ratio 32 (H) 12 - 28    Sodium 140 134 - 144 mmol/L    Potassium 4.6 3.5 - 5.2 mmol/L    Chloride 101 96 - 106 mmol/L    CO2 26 20 - 29 mmol/L    Calcium 10.2 8.7 - 10.3 mg/dL    Protein, total 7.0 6.0 - 8.5 g/dL    Albumin 4.5 3.7 - 4.7 g/dL    GLOBULIN, TOTAL 2.5 1.5 - 4.5 g/dL    A-G Ratio 1.8 1.2 - 2.2    Bilirubin, total 0.3 0.0 - 1.2 mg/dL    Alk. phosphatase 84 44 - 121 IU/L    AST (SGOT) 18 0 - 40 IU/L    ALT (SGPT) 9 0 - 32 IU/L   CVD REPORT   Result Value Ref Range    INTERPRETATION Note      Appointment on 01/03/2023   Component Date Value Ref Range Status    VITAMIN D, 25-HYDROXY 01/03/2023 106.0 (A)  30.0 - 100.0 ng/mL Final    Comment: Vitamin D deficiency has been defined by the [facility] [address] practice guideline as a  level of serum 25-OH vitamin D less than 20 ng/mL (1,2). The Endocrine Society went on to further define vitamin D  insufficiency as a level between 21 and 29 ng/mL (2). 1. IOM ([place] of [employer]). 2010. Dietary reference     intakes for calcium and D. [address] [place] [address]: The     [employer]. 2. Ron MF, Dave NC, Antwan HAYDEN, et al.     Evaluation, treatment, and prevention of vitamin D     deficiency: an Endocrine Society clinical practice     guideline. JCEM. 2011 Jul; 96(7):1911-30.       Hemoglobin A1c 01/03/2023 6.5 (A)  4.8 - 5.6 % Final    Comment:          Prediabetes: 5.7 - 6.4           Diabetes: >6.4           Glycemic control for adults with diabetes: <7.0      WBC 01/03/2023 6.6  3.4 - 10.8 x10E3/uL Final    RBC 01/03/2023 4.83  3.77 - 5.28 x10E6/uL Final    HGB 01/03/2023 13.4  11.1 - 15.9 g/dL Final    HCT 01/03/2023 39.6  34.0 - 46.6 % Final    MCV 01/03/2023 82  79 - 97 fL Final    MCH 01/03/2023 27.7  26.6 - 33.0 pg Final    MCHC 01/03/2023 33.8  31.5 - 35.7 g/dL Final    RDW 01/03/2023 14.0  11.7 - 15.4 % Final    PLATELET 47/31/7418 455  150 - 450 x10E3/uL Final    NEUTROPHILS 01/03/2023 36 Not Estab. % Final    Lymphocytes 01/03/2023 52  Not Estab. % Final    MONOCYTES 01/03/2023 10  Not Estab. % Final    EOSINOPHILS 01/03/2023 1  Not Estab. % Final    BASOPHILS 01/03/2023 1  Not Estab. % Final    ABS. NEUTROPHILS 01/03/2023 2.4  1.4 - 7.0 x10E3/uL Final    Abs Lymphocytes 01/03/2023 3.5 (A)  0.7 - 3.1 x10E3/uL Final    ABS. MONOCYTES 01/03/2023 0.6  0.1 - 0.9 x10E3/uL Final    ABS. EOSINOPHILS 01/03/2023 0.1  0.0 - 0.4 x10E3/uL Final    ABS. BASOPHILS 01/03/2023 0.1  0.0 - 0.2 x10E3/uL Final    IMMATURE GRANULOCYTES 01/03/2023 0  Not Estab. % Final    ABS. IMM. GRANS. 01/03/2023 0.0  0.0 - 0.1 x10E3/uL Final    Cholesterol, total 01/03/2023 212 (A)  100 - 199 mg/dL Final    Triglyceride 01/03/2023 77  0 - 149 mg/dL Final    HDL Cholesterol 01/03/2023 69  >39 mg/dL Final    VLDL, calculated 01/03/2023 14  5 - 40 mg/dL Final    LDL, calculated 01/03/2023 129 (A)  0 - 99 mg/dL Final    Glucose 01/03/2023 123 (A)  70 - 99 mg/dL Final    BUN 01/03/2023 19  8 - 27 mg/dL Final    Creatinine 01/03/2023 0.59  0.57 - 1.00 mg/dL Final    eGFR 01/03/2023 94  >59 mL/min/1.73 Final    BUN/Creatinine ratio 01/03/2023 32 (A)  12 - 28 Final    Sodium 01/03/2023 140  134 - 144 mmol/L Final    Potassium 01/03/2023 4.6  3.5 - 5.2 mmol/L Final    Chloride 01/03/2023 101  96 - 106 mmol/L Final    CO2 01/03/2023 26  20 - 29 mmol/L Final    Calcium 01/03/2023 10.2  8.7 - 10.3 mg/dL Final    Protein, total 01/03/2023 7.0  6.0 - 8.5 g/dL Final    Albumin 01/03/2023 4.5  3.7 - 4.7 g/dL Final    GLOBULIN, TOTAL 01/03/2023 2.5  1.5 - 4.5 g/dL Final    A-G Ratio 01/03/2023 1.8  1.2 - 2.2 Final    Bilirubin, total 01/03/2023 0.3  0.0 - 1.2 mg/dL Final    Alk. phosphatase 01/03/2023 84  44 - 121 IU/L Final    AST (SGOT) 01/03/2023 18  0 - 40 IU/L Final    ALT (SGPT) 01/03/2023 9  0 - 32 IU/L Final    INTERPRETATION 01/03/2023 Note   Final    Supplemental report is available.

## 2023-01-11 PROCEDURE — 90694 VACC AIIV4 NO PRSRV 0.5ML IM: CPT | Performed by: LEGAL MEDICINE

## 2023-01-11 NOTE — PROGRESS NOTES
Tania Bond     Chief Complaint   Patient presents with    Physical    Medication Refill    Shortness of Breath    Hypertension    Follow Up Chronic Condition     Vitals:    01/10/23 1045   BP: 138/82   Pulse: 73   Resp: 15   Temp: 97.2 °F (36.2 °C)   TempSrc: Temporal   SpO2: 99%   Weight: 157 lb 9.6 oz (71.5 kg)   Height: 5' 1.5\" (1.562 m)   PainSc:   7   PainLoc: Back         HPI: Derek Pagan is shere for CPE   Her other concerns has been addressed in a different encounter    she is accompanied by her son Jeanie Arora who is visiting them she had blood work done  It showed well-controlled diabetes to continue taking metformin  Hemoglobin A1c 6.5 that is a decrease from 7.4  LDL is elevated she has not been taking rosuvastatin for few months due to side effects of heartburn  Vitamin D level is elevated advised to stop vitamin D for 6 months    She has been having decreased appetite she lost about 10 pounds in 1 year    She is due for follow-up on DEXA scan  She is overdue for colon cancer screening she agreed this time to do the Cologuard testing it will be sent to her home address   She will be getting flu vaccine today    Past Medical History:   Diagnosis Date    Diabetes ([address]     Hypercholesteremia     Hypertension      Past Surgical History:   Procedure Laterality Date    HX CHOLECYSTECTOMY       Social History     Tobacco Use    Smoking status: Never    Smokeless tobacco: Never   Substance Use Topics    Alcohol use: No       Family History   Problem Relation Age of Onset    Diabetes Mother     Hypertension Mother        Review of Systems   Constitutional:  Negative for chills, fever, malaise/fatigue and weight loss. HENT:  Negative for congestion, ear discharge, ear pain, hearing loss, nosebleeds, sinus pain and sore throat. Eyes:  Negative for blurred vision, double vision and discharge. Respiratory:  Negative for cough, hemoptysis, sputum production, shortness of breath, wheezing and stridor. Cardiovascular:  Negative for chest pain, palpitations, claudication and leg swelling. Gastrointestinal:  Negative for abdominal pain, constipation, diarrhea, nausea and vomiting. Genitourinary:  Negative for dysuria, frequency and urgency. Skin:  Negative for itching and rash. Neurological:  Negative for dizziness, tingling, sensory change, speech change, focal weakness, weakness and headaches. Psychiatric/Behavioral:  Negative for depression, memory loss, substance abuse and suicidal ideas. The patient has insomnia. The patient is not nervous/anxious. Physical Exam  Vitals and nursing note reviewed. Exam conducted with a chaperone present. Constitutional:       General: She is not in acute distress. Appearance: Normal appearance. She is well-developed. She is toxic-appearing. She is not ill-appearing or diaphoretic. HENT:      Head: Normocephalic and atraumatic. Right Ear: Ear canal and external ear normal. There is no impacted cerumen. Left Ear: Ear canal and external ear normal. There is no impacted cerumen. Nose: No congestion or rhinorrhea. Mouth/Throat:      Pharynx: No oropharyngeal exudate. Eyes:      General: No scleral icterus. Right eye: No discharge. Left eye: No discharge. Conjunctiva/sclera: Conjunctivae normal.      Pupils: Pupils are equal, round, and reactive to light. Neck:      Thyroid: No thyromegaly. Vascular: No carotid bruit. Cardiovascular:      Rate and Rhythm: Normal rate and regular rhythm. Heart sounds: Normal heart sounds. No murmur heard. Pulmonary:      Effort: Pulmonary effort is normal. No respiratory distress. Breath sounds: Normal breath sounds. No wheezing or rales. Chest:      Chest wall: No tenderness. Abdominal:      General: There is no distension. Palpations: Abdomen is soft. There is no mass. Tenderness: There is no abdominal tenderness.  There is no right CVA tenderness, guarding or rebound. Hernia: No hernia is present. Musculoskeletal:         General: No swelling, deformity or signs of injury. Normal range of motion. Cervical back: Neck supple. No rigidity or tenderness. Right lower leg: No edema. Left lower leg: No edema. Lymphadenopathy:      Cervical: No cervical adenopathy. Skin:     General: Skin is warm and dry. Coloration: Skin is not pale. Findings: No erythema or rash. Neurological:      General: No focal deficit present. Mental Status: She is alert and oriented to person, place, and time. Cranial Nerves: No cranial nerve deficit. Sensory: No sensory deficit. Motor: No weakness. Coordination: Coordination normal.      Gait: Gait abnormal.   Psychiatric:         Mood and Affect: Mood normal.         Behavior: Behavior normal.         Thought Content: Thought content normal.         Judgment: Judgment normal.        Assessment and plan     Plan of care has been discussed with the patient, he agrees to the plan and verbalized understanding. All his questions were answered  More than 50% of the time spent in this visit was counseling the patient about  illness and treatment options         1. Annual physical exam  Lab results D/W     2. Osteoporosis, unspecified osteoporosis type, unspecified pathological fracture presence    She is on vitamin D and calcium supplements   - DEXA BONE DENSITY STUDY AXIAL; Future  3. Colon cancer screening  Agrees to get cologuard   - COLOGUARD TEST (FECAL DNA COLORECTAL CANCER SCREENING)    4. Encounter for screening for malignant neoplasm of breast, unspecified screening modality  Agreed for mammogram   - [facility] MAMMO BI SCREENING INCL CAD; [facility]  5.  Needs flu shot  Flu vaccine was given with no complications   - INFLUENZA, FLUAD, (AGE 65 Y+), IM, PF, 0.5 ML    Current Outpatient Medications   Medication Sig Dispense Refill    atorvastatin (LIPITOR) 10 mg tablet Take 1 Tablet by mouth daily for 90 days. 90 Tablet 3    metFORMIN (GLUCOPHAGE) 1,000 mg tablet Take 1 Tablet by mouth two (2) times daily (with meals). 180 Tablet 3    lemborexant (Dayvigo) 5 mg tab Take 5 mg by mouth nightly for 30 days. Max Daily Amount: 5 mg. 30 Each 5    lisinopril-hydroCHLOROthiazide (PRINZIDE, ZESTORETIC) 20-12.5 mg per tablet Take 1 tablet by mouth once daily 90 Tablet 0    Restasis 0.05 % dpet       calcium-cholecalciferol, D3, (CALTRATE 600+D) tablet Take 1 Tab by mouth daily. cholecalciferol (VITAMIN D3) 25 mcg (1,000 unit) cap Take 4,000 Units by mouth daily. aspirin delayed-release 81 mg tablet Take 81 mg by mouth daily. tretinoin (RETIN-A) 0.025 % topical cream Use  At night daily (Patient not taking: Reported on 1/10/2023) 45 g 0    omega-3 acid ethyl esters (LOVAZA) 1 gram capsule Take 2 g by mouth two (2) times a day.  (Patient not taking: Reported on 1/10/2023)         Patient Active Problem List    Diagnosis Date Noted    Wears hearing aid 01/10/2023    Insomnia 01/10/2023    Controlled type 2 diabetes mellitus with complication, without long-term current use of insulin ([facility] 07/02/2019    DDD (degenerative disc disease), cervical 08/27/2018    Essential hypertension 08/27/2018    Mixed hyperlipidemia 08/27/2018    Osteoporosis 08/27/2018    Irritable bowel syndrome with both constipation and diarrhea 08/27/2018    Osteoarthritis of both knees 08/27/2018    Primary osteoarthritis of right knee 01/13/2017     Results for orders placed or performed in visit on 01/03/23   VITAMIN D, 25 HYDROXY   Result Value Ref Range    VITAMIN D, 25-HYDROXY 106.0 (H) 30.0 - 100.0 ng/mL   HEMOGLOBIN A1C W/O EAG   Result Value Ref Range    Hemoglobin A1c 6.5 (H) 4.8 - 5.6 %   CBC WITH AUTOMATED DIFF   Result Value Ref Range    WBC 6.6 3.4 - 10.8 x10E3/uL    RBC 4.83 3.77 - 5.28 x10E6/uL    HGB 13.4 11.1 - 15.9 g/dL    HCT 39.6 34.0 - 46.6 %    MCV 82 79 - 97 fL    MCH 27.7 26.6 - 33.0 pg    MCHC 33.8 31.5 - 35.7 g/dL    RDW 14.0 11.7 - 15.4 %    PLATELET 512 310 - 345 x10E3/uL    NEUTROPHILS 36 Not Estab. %    Lymphocytes 52 Not Estab. %    MONOCYTES 10 Not Estab. %    EOSINOPHILS 1 Not Estab. %    BASOPHILS 1 Not Estab. %    ABS. NEUTROPHILS 2.4 1.4 - 7.0 x10E3/uL    Abs Lymphocytes 3.5 (H) 0.7 - 3.1 x10E3/uL    ABS. MONOCYTES 0.6 0.1 - 0.9 x10E3/uL    ABS. EOSINOPHILS 0.1 0.0 - 0.4 x10E3/uL    ABS. BASOPHILS 0.1 0.0 - 0.2 x10E3/uL    IMMATURE GRANULOCYTES 0 Not Estab. %    ABS. IMM. GRANS. 0.0 0.0 - 0.1 x10E3/uL   LIPID PANEL   Result Value Ref Range    Cholesterol, total 212 (H) 100 - 199 mg/dL    Triglyceride 77 0 - 149 mg/dL    HDL Cholesterol 69 >39 mg/dL    VLDL, calculated 14 5 - 40 mg/dL    LDL, calculated 129 (H) 0 - 99 mg/dL   METABOLIC PANEL, COMPREHENSIVE   Result Value Ref Range    Glucose 123 (H) 70 - 99 mg/dL    BUN 19 8 - 27 mg/dL    Creatinine 0.59 0.57 - 1.00 mg/dL    eGFR 94 >59 mL/min/1.73    BUN/Creatinine ratio 32 (H) 12 - 28    Sodium 140 134 - 144 mmol/L    Potassium 4.6 3.5 - 5.2 mmol/L    Chloride 101 96 - 106 mmol/L    CO2 26 20 - 29 mmol/L    Calcium 10.2 8.7 - 10.3 mg/dL    Protein, total 7.0 6.0 - 8.5 g/dL    Albumin 4.5 3.7 - 4.7 g/dL    GLOBULIN, TOTAL 2.5 1.5 - 4.5 g/dL    A-G Ratio 1.8 1.2 - 2.2    Bilirubin, total 0.3 0.0 - 1.2 mg/dL    Alk. phosphatase 84 44 - 121 IU/L    AST (SGOT) 18 0 - 40 IU/L    ALT (SGPT) 9 0 - 32 IU/L   CVD REPORT   Result Value Ref Range    INTERPRETATION Note      Appointment on 01/03/2023   Component Date Value Ref Range Status    VITAMIN D, 25-HYDROXY 01/03/2023 106.0 (A)  30.0 - 100.0 ng/mL Final    Comment: Vitamin D deficiency has been defined by the [address] practice guideline as a  level of serum 25-OH vitamin D less than 20 ng/mL (1,2). The Endocrine Society went on to further define vitamin D  insufficiency as a level between 21 and 29 ng/mL (2). 1. IOM ([place] of Medicine). 2010.  Dietary reference     intakes for calcium and D. [facility]: The     [employer]. 2. Ron MF, Dave NC, Antwan HAYDEN, et al.     Evaluation, treatment, and prevention of vitamin D     deficiency: an Endocrine Society clinical practice     guideline. JCEM. 2011 Jul; 96(4):3161-30. Hemoglobin A1c 01/03/2023 6.5 (A)  4.8 - 5.6 % Final    Comment:          Prediabetes: 5.7 - 6.4           Diabetes: >6.4           Glycemic control for adults with diabetes: <7.0      WBC 01/03/2023 6.6  3.4 - 10.8 x10E3/uL Final    RBC 01/03/2023 4.83  3.77 - 5.28 x10E6/uL Final    HGB 01/03/2023 13.4  11.1 - 15.9 g/dL Final    HCT 01/03/2023 39.6  34.0 - 46.6 % Final    MCV 01/03/2023 82  79 - 97 fL Final    MCH 01/03/2023 27.7  26.6 - 33.0 pg Final    MCHC 01/03/2023 33.8  31.5 - 35.7 g/dL Final    RDW 01/03/2023 14.0  11.7 - 15.4 % Final    PLATELET 18/77/7844 973  150 - 450 x10E3/uL Final    NEUTROPHILS 01/03/2023 36  Not Estab. % Final    Lymphocytes 01/03/2023 52  Not Estab. % Final    MONOCYTES 01/03/2023 10  Not Estab. % Final    EOSINOPHILS 01/03/2023 1  Not Estab. % Final    BASOPHILS 01/03/2023 1  Not Estab. % Final    ABS. NEUTROPHILS 01/03/2023 2.4  1.4 - 7.0 x10E3/uL Final    Abs Lymphocytes 01/03/2023 3.5 (A)  0.7 - 3.1 x10E3/uL Final    ABS. MONOCYTES 01/03/2023 0.6  0.1 - 0.9 x10E3/uL Final    ABS. EOSINOPHILS 01/03/2023 0.1  0.0 - 0.4 x10E3/uL Final    ABS. BASOPHILS 01/03/2023 0.1  0.0 - 0.2 x10E3/uL Final    IMMATURE GRANULOCYTES 01/03/2023 0  Not Estab. % Final    ABS. IMM.  GRANS. 01/03/2023 0.0  0.0 - 0.1 x10E3/uL Final    Cholesterol, total 01/03/2023 212 (A)  100 - 199 mg/dL Final    Triglyceride 01/03/2023 77  0 - 149 mg/dL Final    HDL Cholesterol 01/03/2023 69  >39 mg/dL Final    VLDL, calculated 01/03/2023 14  5 - 40 mg/dL Final    LDL, calculated 01/03/2023 129 (A)  0 - 99 mg/dL Final    Glucose 01/03/2023 123 (A)  70 - 99 mg/dL Final    BUN 01/03/2023 19  8 - 27 mg/dL Final Creatinine 01/03/2023 0.59  0.57 - 1.00 mg/dL Final    eGFR 01/03/2023 94  >59 mL/min/1.73 Final    BUN/Creatinine ratio 01/03/2023 32 (A)  12 - 28 Final    Sodium 01/03/2023 140  134 - 144 mmol/L Final    Potassium 01/03/2023 4.6  3.5 - 5.2 mmol/L Final    Chloride 01/03/2023 101  96 - 106 mmol/L Final    CO2 01/03/2023 26  20 - 29 mmol/L Final    Calcium 01/03/2023 10.2  8.7 - 10.3 mg/dL Final    Protein, total 01/03/2023 7.0  6.0 - 8.5 g/dL Final    Albumin 01/03/2023 4.5  3.7 - 4.7 g/dL Final    GLOBULIN, TOTAL 01/03/2023 2.5  1.5 - 4.5 g/dL Final    A-G Ratio 01/03/2023 1.8  1.2 - 2.2 Final    Bilirubin, total 01/03/2023 0.3  0.0 - 1.2 mg/dL Final    Alk. phosphatase 01/03/2023 84  44 - 121 IU/L Final    AST (SGOT) 01/03/2023 18  0 - 40 IU/L Final    ALT (SGPT) 01/03/2023 9  0 - 32 IU/L Final    INTERPRETATION 01/03/2023 Note   Final    Supplemental report is available. Follow-up and Dispositions    Return in about 6 months (around 7/10/2023).

## 2023-01-12 ENCOUNTER — TELEPHONE (OUTPATIENT)
Dept: FAMILY MEDICINE CLINIC | Age: 73
End: 2023-01-12

## 2023-01-13 NOTE — TELEPHONE ENCOUNTER
Received Prior [facility] approval, Spoke w/ [facility] pharmacy to process and was successful. Pt will be contacted by pharmacy when ready for .

## 2023-01-15 ENCOUNTER — HOME HEALTH ADMISSION (OUTPATIENT)
Dept: HOME HEALTH SERVICES | Facility: HOME HEALTH | Age: 73
End: 2023-01-15

## 2023-01-16 ENCOUNTER — HOME CARE VISIT (OUTPATIENT)
Dept: HOME HEALTH SERVICES | Facility: HOME HEALTH | Age: 73
End: 2023-01-16

## 2023-01-19 ENCOUNTER — HOSPITAL ENCOUNTER (OUTPATIENT)
Dept: PHYSICAL THERAPY | Age: 73
Discharge: HOME | End: 2023-01-19
Payer: MEDICAID

## 2023-01-19 PROCEDURE — 97162 PT EVAL MOD COMPLEX 30 MIN: CPT

## 2023-01-19 NOTE — THERAPY EVALUATION
[address], [address], [facility] - Phone: [phone]  Fax: [phone] / [address] [facility]  Patient Name: Corina Britt : 1950   Medical   Diagnosis: Pain in left shoulder [M25.512] Treatment Diagnosis: L shoulder pain   Onset Date: 6 months ago     Referral Source: Wicho White MD [place] [facility]): 2023   Prior Hospitalization: See medical history Provider #: [id number]   Prior Level of Function: No pain in L shoulder during ADLs. Comorbidities: Diabetes, arthritis, HTN, hearing impaired   Medications: Verified on Patient Summary List   The Plan of Care and following information is based on the information from the initial evaluation.   ===========================================================================================  Assessment / marshall information:  Corina Britt is a 74 y.o. female with Dx: L shoulder pain starting 6 months ago insidiously. Notes nothing changes the pain level in the shoulder; does note prolonged sitting makes the shoulder pain worse. X-rays of the shoulder show arthritis but no break. Denies N/T. Denies neck pain. Pt also complains of low back pain. Notes back pain is worse with arching her back and can only sit for a few hours before pain increases. Can walk for as long as she wants, however that is not far. Denies any falls. Pt rates pain as 9/10 max, 4/10 at best, 7/10 currently in the shoulder. Low back pain currently 4/10. Objective: FOTO score = 44 (an established functional score where 100 = no disability). Posture: forward head with rounded shoulders. Palpation TTP along subscap, infraspinatus, rhomboids, UT, and levator all on L. Shoulder ROM Flexion R 155 L 160, ABD R 160 L 160, Functional IR R T10 L T8, Functional ER R T2 L T3.    Trunk rotation R limited 40%, L limited 75% with pain  Strength: Shoulder Flexion R 3+/5 L 3+/5 with pain, ABD R 4-/5 L 4-/5, ER R 4-/5 with pain L 4+/5, IR R 4-/5 with pain L 5/5. Special Tests: - subscap lift, - grijalva ta, - ER lag, pain at end range PROM  Current deficits include increased tissue restriction with poor scapular strength and poor posture leading to pain in L shoulder. Advised pt of walking program.  Pt will benefit from a comprehensive POC/HEP to address impairments and restore function in order to return to prior level of function and prevent secondary impairments.  ===========================================================================================  Eval Complexity: History HIGH Complexity :3+ comorbidities / personal factors will impact the outcome/ POC ;  Examination  HIGH Complexity : 4+ Standardized tests and measures addressing body structure, function, activity limitation and / or participation in recreation ; Presentation MEDIUM Complexity : Evolving with changing characteristics ; Decision Making MEDIUM Complexity : FOTO score of 26-74; Overall Complexity MEDIUM  Problem List: pain affecting function, decrease ROM, decrease strength, decrease ADL/ functional abilitiies, decrease activity tolerance, decrease flexibility/ joint mobility, and decrease transfer abilities   Treatment Plan may include any combination of the following: Therapeutic exercise, Neuromuscular reeducation, Manual therapy, Therapeutic activity, Self care/home management, Aquatic therapy, and Gait training  Patient / Family readiness to learn indicated by: asking questions, trying to perform skills, and interest  Persons(s) to be included in education: patient (P)  Barriers to Learning/Limitations: None  Measures taken, if barriers to learning:    Patient Goal (s): Pain control and relief   Patient self reported health status: fair  Rehabilitation Potential: good  Short Term Goals: To be accomplished in  3  weeks: Independent with HEP to progress to meet goals.   2. Pt to report decreased max pain levels less than 7/10 for improvement in ADLs. Long Term Goals: To be accomplished in  6  weeks:  Improve FOTO score to 55/100 to show a significant functional change. 2. Pt to report decreased max pain levels less than 5/10 for improvement in QoL. 3. Pt to demonstrate proper form and scapular control with 2x10 row to improve postural musculature. Frequency / Duration:   Patient to be seen  1-2  times per week for 6  weeks:  Patient / Caregiver education and instruction: self care, activity modification, and exercises  Therapist Signature: Kelby Parish PT , DPT Date: 7/59/1689   Certification Period: na Time: 4:10 PM   ===========================================================================================  I certify that the above Physical Therapy Services are being furnished while the patient is under my care. I agree with the treatment plan and certify that this therapy is necessary. Physician Signature:        Date:       Time:                                        Abel Younger MD  Please sign and return to [facility]. or you may fax the signed copy to [phone]. Thank you.

## 2023-01-19 NOTE — PROGRESS NOTES
PHYSICAL THERAPY - DAILY TREATMENT NOTE    Patient Name: Blaine Cid        Date: 2023  : 1950   yes Patient  Verified  Visit #:      12  Insurance: Payor: BLUE CROSS MEDICAID / Plan: [facility] HEALTHKEEPERS PLUS / Product Type: Managed Care Medicaid /      In time: 4:15 Out time: 4:50   Total Treatment Time: 35     Medicare/BCBS Time Tracking (below)   Total Timed Codes (min):  5 1:1 Treatment Time:  35     TREATMENT AREA =  Pain in left shoulder [M25.512]    SUBJECTIVE  Pain Level (on 0 to 10 scale):  4  / 10   Medication Changes/New allergies or changes in medical history, any new surgeries or procedures?    no  If yes, update Summary List   Subjective Functional Status/Changes:  []  No changes reported     See POC          OBJECTIVE    5 min Self Care: Pt education on HEP, POC, prognosis, and diagnosis. Rationale:    Improve pt understanding to improve the patients ability to progress therapy at home. Billed With/As:   [] TE   [] TA   [] Neuro   [x] Self Care Patient Education: [x] Review HEP    [] Progressed/Changed HEP based on:   [] positioning   [] body mechanics   [] transfers   [] heat/ice application    [] other:      Other Objective/Functional Measures:    See POC     Post Treatment Pain Level (on 0 to 10) scale:   4  / 10     ASSESSMENT  Assessment/Changes in Function:     See POC     []  See Progress Note/Recertification   Patient will continue to benefit from skilled PT services to modify and progress therapeutic interventions, address functional mobility deficits, analyze and cue movement patterns, and analyze and modify body mechanics/ergonomics to attain remaining goals.    Progress toward goals / Updated goals:    See POC     PLAN  [x]  Upgrade activities as tolerated yes Continue plan of care   []  Discharge due to :    []  Other:      Therapist: Fareed Ontiveros PT , DPT    Date: 2023 Time: 12:07 PM     Future Appointments   Date Time Provider Salome Chakraborty 1/19/2023  4:00 PM Bj Ring,  [address] [facility]

## 2023-02-01 DIAGNOSIS — M81.0 OSTEOPOROSIS, UNSPECIFIED OSTEOPOROSIS TYPE, UNSPECIFIED PATHOLOGICAL FRACTURE PRESENCE: Primary | ICD-10-CM

## 2023-02-02 ENCOUNTER — APPOINTMENT (OUTPATIENT)
Dept: PHYSICAL THERAPY | Age: 73
End: 2023-02-02
Payer: MEDICAID

## 2023-02-02 ENCOUNTER — TELEPHONE (OUTPATIENT)
Dept: PHYSICAL THERAPY | Age: 73
End: 2023-02-02

## 2023-02-04 DIAGNOSIS — M81.0 OSTEOPOROSIS, UNSPECIFIED OSTEOPOROSIS TYPE, UNSPECIFIED PATHOLOGICAL FRACTURE PRESENCE: Primary | ICD-10-CM

## 2023-02-10 ENCOUNTER — HOSPITAL ENCOUNTER (OUTPATIENT)
Dept: PHYSICAL THERAPY | Age: 73
Discharge: HOME | End: 2023-02-10
Payer: MEDICAID

## 2023-02-10 PROCEDURE — 97535 SELF CARE MNGMENT TRAINING: CPT

## 2023-02-10 PROCEDURE — 97110 THERAPEUTIC EXERCISES: CPT

## 2023-02-10 PROCEDURE — 97112 NEUROMUSCULAR REEDUCATION: CPT

## 2023-02-10 NOTE — PROGRESS NOTES
PHYSICAL THERAPY - DAILY TREATMENT NOTE    Patient Name: Andres Rey        Date: 2/10/2023  : 1950   yes Patient  Verified  Visit #:   2   of   12  Insurance: Payor: BLUE CROSS MEDICAID / Plan: [facility] HEALTHKEEPERS PLUS / Product Type: Managed Care Medicaid /      In time: 3:30 Out time: 4:13   Total Treatment Time: 43     Medicare/BCBS Time Tracking (below)   Total Timed Codes (min):  43 1:1 Treatment Time:  43     TREATMENT AREA =  Pain in left shoulder [M25.512]    SUBJECTIVE  Pain Level (on 0 to 10 scale):  7-9  / 10   Medication Changes/New allergies or changes in medical history, any new surgeries or procedures?    no  If yes, update Summary List   Subjective Functional Status/Changes:  []  No changes reported     Reports the shoulder has been about the same. OBJECTIVE  15 min Therapeutic Exercise:  [x]  See flow sheet   Rationale:      increase ROM and increase strength to improve the patients ability to reach overhead     15 min Neuromuscular Re-ed: [x]  See flow sheet   Rationale:    increase strength and improve coordination to improve the patients ability to stabilize scapula    8 min Self Care: Pt education on HEP, POC, prognosis, and diagnosis. Rationale:    Improve pt understanding to improve the patients ability to progress therapy at home. 5 min Manual Therapy: TPR to subscap   Rationale:      decrease pain, increase tissue extensibility, and decrease trigger points to improve patient's ability to tolerate ADLs  The manual therapy interventions were performed at a separate and distinct time from the therapeutic activities interventions. Billed With/As:   [] TE   [] TA   [] Neuro   [x] Self Care Patient Education: [x] Review HEP    [] Progressed/Changed HEP based on:   [] positioning   [] body mechanics   [] transfers   [] heat/ice application    [] other:      Other Objective/Functional Measures:    See flow sheet for exercises performed.      Post Treatment Pain Level (on 0 to 10) scale:   5  / 10     ASSESSMENT  Assessment/Changes in Function:     Plausibly subscap pathology with limited thoracic extension mobility. No adverse reactions to session. Daughter and  present during session. Sent home with HEP and all parties demonstrated understanding. []  See Progress Note/Recertification   Patient will continue to benefit from skilled PT services to modify and progress therapeutic interventions, address functional mobility deficits, analyze and cue movement patterns, and analyze and modify body mechanics/ergonomics to attain remaining goals. Progress toward goals / Updated goals:    Pt given HEP.      PLAN  [x]  Upgrade activities as tolerated yes Continue plan of care   []  Discharge due to :    []  Other:      Therapist: Sofia Andrews PT , DPT    Date: 2/10/2023 Time: 12:07 PM     Future Appointments   Date Time Provider Salome Chakraborty   2/10/2023  3:30 PM Claudia Morse PT [facility] [facility]

## 2023-02-15 ENCOUNTER — HOSPITAL ENCOUNTER (OUTPATIENT)
Facility: HOSPITAL | Age: 73
Setting detail: RECURRING SERIES
Discharge: HOME | End: 2023-02-18
Payer: COMMERCIAL

## 2023-02-15 NOTE — PROGRESS NOTES
PHYSICAL / OCCUPATIONAL THERAPY - DAILY TREATMENT NOTE (updated )    Patient Name: Marcio Hoang    Date: 2/15/2023    : 1950  Insurance: Payor: Edi Lott / Plan: Vinicius Snider / Product Type: *No Product type* /      Patient  verified Yes     Visit #   Current / Total 3 13   Time   In / Out 3:33 4:05   Pain   In / Out 7 7   Subjective Functional Status/Changes: Reports pain in the shoulder and also the hips. Hips hurt more when stepping up or down. Changes to:  Meds, Allergies, Med Hx, Sx Hx? If yes, update Summary List no       TREATMENT AREA =  L shoulder pain and Low back pain    OBJECTIVE     Therapeutic Procedures: Tx Min Billable or 1:1 Min (if diff from Tx Min) Procedure, Rationale, Specifics   15  [id number] Neuromuscular Re-Education (timed):  improve balance, coordination, kinesthetic sense, posture, core stability and proprioception to improve patient's ability to develop conscious control of individual muscles and awareness of position of extremities in order to progress to PLOF and address remaining functional goals. (see flow sheet as applicable)     Details if applicable:       17  [id number] Therapeutic Exercise (timed):  increase ROM, strength, coordination, balance, and proprioception to improve patient's ability to progress to PLOF and address remaining functional goals.  (see flow sheet as applicable)     Details if applicable:            Details if applicable:            Details if applicable:            Details if applicable:     28  [facility] Totals Reminder: bill using total billable min of TIMED therapeutic procedures (example: do not include dry needle or estim unattended, both untimed codes, in totals to left)  8-22 min = 1 unit; 23-37 min = 2 units; 38-52 min = 3 units; 53-67 min = 4 units; 68-82 min = 5 units   Total Total     [x]  Patient Education billed concurrently with other procedures   [x] Review HEP    [] Progressed/Changed HEP, detail:    [] Other detail:       Objective Information/Functional Measures/Assessment    Access Code: [email or web address]  URL: [email or web address]"/  Date: 02/15/2023  Prepared by: Ashlee Roberson    Exercises  Clamshell - 1 x daily - 7 x weekly - 2 sets - 10 reps  Supine Lower Trunk Rotation - 1 x daily - 7 x weekly - 10 reps  Supine March - 1 x daily - 7 x weekly - 2 sets - 10 reps  Seated Piriformis Stretch with Trunk Bend - 2 x daily - 7 x weekly - 2 sets - 30sec hold    Session focus on low back and hip today. Pain radiating to L ankle after bridges and SLR, however lessened with SB roll outs. Educated pt and pt daughter about centralizing pain pattern. Will continue to treat L shoulder and L hip with progressing therex as tolerated. Patient will continue to benefit from skilled PT / OT services to modify and progress therapeutic interventions, analyze and address functional mobility deficits, analyze and address ROM deficits, analyze and address strength deficits, analyze and address soft tissue restrictions, analyze and cue for proper movement patterns, and analyze and modify for postural abnormalities to address functional deficits and attain remaining goals. Progress toward goals / Updated goals:  []  See Progress Note/Recertification    No progress towards pain goal this date.     PLAN  Yes  Continue plan of care  [x]  Upgrade activities as tolerated  []  Discharge due to :  []  Other:    Ashlee Roberson PT    2/15/2023    10:07 AM    Future Appointments   Date Time Provider Alexandre He   2/15/2023  3:30 PM Ashlee Roberson PT [facility]   2/17/2023  2:30 PM Ashlee Roberson PT [facility]   2/22/2023  4:00 PM Ashlee Roberson PT [facility]   2/24/2023  2:30 PM Ashlee Roberson PT [facility]   2/28/2023  4:30 PM Ashlee Roberson PT [facility]   3/2/2023  3:30 PM Ashlee Roberson PT [facility]   3/8/2023  3:30 PM Ashlee Roberson, PT [facility]   3/9/2023  3:30 PM Ashlee Roberson, PT [facility]   3/14/2023  4:00 PM Ashlee Roberson, PT [facility]   3/16/2023  3:30 PM Ashlee Roberson, PT [facility]   3/21/2023  4:00 PM Dasia Granda, PT [id number]

## 2023-02-17 ENCOUNTER — TELEPHONE (OUTPATIENT)
Facility: HOSPITAL | Age: 73
End: 2023-02-17

## 2023-02-17 ENCOUNTER — HOSPITAL ENCOUNTER (OUTPATIENT)
Facility: HOSPITAL | Age: 73
Setting detail: RECURRING SERIES
End: 2023-02-17
Payer: COMMERCIAL

## 2023-02-22 ENCOUNTER — HOSPITAL ENCOUNTER (OUTPATIENT)
Facility: HOSPITAL | Age: 73
Setting detail: RECURRING SERIES
Discharge: HOME | End: 2023-02-25
Payer: COMMERCIAL

## 2023-02-22 PROCEDURE — 97112 NEUROMUSCULAR REEDUCATION: CPT

## 2023-02-22 PROCEDURE — 97110 THERAPEUTIC EXERCISES: CPT

## 2023-02-22 NOTE — THERAPY RECERTIFICATION
[facility] THERAPY  [address],[facility], [address] [facility] - Ph: [phone]  Fx: [phone]  PHYSICAL THERAPY PROGRESS NOTE  Patient Name: Shoshana Hu : 1950   Treatment/Medical Diagnosis:  L shoulder pain   Referral Source: Dasha Pham     Date of Initial Visit: 23 Attended Visits: 4 Missed Visits: 1     SUMMARY OF TREATMENT  Pt was seen for IE and 3 f/u visits with treatment consisting of manual therapy, therapeutic exercises, therapeutic activities, neuromuscular reeducation, and self care techniques to improve L shoulder ROM and strength along with instruction of HEP. CURRENT STATUS  Pt has made little progress in PT likely due to limited attended appointments. Notes max pain levels at 7/10 and average pain level of 7/10. Pt notes continued difficulty with cooking. Notes the exercises have been helping, but the relief is short term. She demonstrates Pt notes they are completing their HEP at least once a day. Pt will continue to benefit from skilled PT to progress exercises and improve upon L shoulder stability, ROM, and strength. Goal/Measure of Progress Goal Met? 1. Independent with HEP to progress to meet goals. Status at last Eval:  Current Status: yes yes   2. Pt to report decreased max pain levels less than 7/10 for improvement in ADLs. Status at last Eval: 9/10 Current Status: 7 yes   3. Improve FOTO score to 55/100 to show a significant functional change. Status at last Eval: 44 Current Status: NT n/a               New Goals to be achieved in __4__ weeks  1. Improve FOTO score to 55/100 to show a significant functional change. 2. Pt to report decreased max pain levels less than 5/10 for improvement in QoL. 3. Pt to demonstrate proper form and scapular control with 2x10 row to improve postural musculature.     RECOMMENDATIONS  Continue seeing pt an additional 2/week for 4 weeks to further improve L shoulder ROM, strength, and functional activities. If you have any questions/comments please contact us directly at [phone]. Thank you for allowing us to assist in the care of your patient. Toya Figueroa, PT       2/22/2023       9:55 AM    NOTE TO PHYSICIAN:  PLEASE COMPLETE THE ORDERS BELOW AND FAX TO   [facility]: [phone]  If you are unable to process this request in 24 hours please contact our office: [phone]     ___ I have read the above report and request that my patient continue as recommended.   ___ I have read the above report and request that my patient continue therapy with the following changes/special instructions:_________________________________________________________   ___ I have read the above report and request that my patient be discharged from therapy.       Physician Signature:                                                               Date:                                        Time:

## 2023-02-22 NOTE — PROGRESS NOTES
PHYSICAL / OCCUPATIONAL THERAPY - DAILY TREATMENT NOTE (updated )    Patient Name: Jacob Uribe    Date: 2023    : 1950  Insurance: Payor: Ashok Springer / Plan: Junior Verdin / Product Type: *No Product type* /      Patient  verified Yes     Visit #   Current / Total 4 13   Time   In / Out 4:00 4:30   Pain   In / Out 7 7   Subjective Functional Status/Changes: Reports pain in the shoulder and also the hips. Hips hurt more when stepping up or down. Changes to:  Meds, Allergies, Med Hx, Sx Hx? If yes, update Summary List no       TREATMENT AREA =  L shoulder pain and Low back pain    OBJECTIVE     Therapeutic Procedures: Tx Min Billable or 1:1 Min (if diff from Tx Min) Procedure, Rationale, Specifics   15  [id number] Neuromuscular Re-Education (timed):  improve balance, coordination, kinesthetic sense, posture, core stability and proprioception to improve patient's ability to develop conscious control of individual muscles and awareness of position of extremities in order to progress to PLOF and address remaining functional goals. (see flow sheet as applicable)     Details if applicable:       10  [id number] Therapeutic Exercise (timed):  increase ROM, strength, coordination, balance, and proprioception to improve patient's ability to progress to PLOF and address remaining functional goals. (see flow sheet as applicable)     Details if applicable:     5   70749 Manual Therapy (timed):  decrease pain, increase ROM, increase tissue extensibility, and decrease trigger points to improve patient's ability to progress to PLOF and address remaining functional goals. The manual therapy interventions were performed at a separate and distinct time from the therapeutic activities interventions .  (see flow sheet as applicable)       Details if applicable:  subscap TPR          Details if applicable:            Details if applicable:     30  [facility] Totals Reminder: bill using total billable min of TIMED therapeutic procedures (example: do not include dry needle or estim unattended, both untimed codes, in totals to left)  8-22 min = 1 unit; 23-37 min = 2 units; 38-52 min = 3 units; 53-67 min = 4 units; 68-82 min = 5 units   Total Total     [x]  Patient Education billed concurrently with other procedures   [x] Review HEP    [] Progressed/Changed HEP, detail:    [] Other detail:       Objective Information/Functional Measures/Assessment    See PN    Patient will continue to benefit from skilled PT / OT services to modify and progress therapeutic interventions, analyze and address functional mobility deficits, analyze and address ROM deficits, analyze and address strength deficits, analyze and address soft tissue restrictions, analyze and cue for proper movement patterns, and analyze and modify for postural abnormalities to address functional deficits and attain remaining goals.     Progress toward goals / Updated goals:  [x]  See Progress Note/Recertification    See PN    PLAN  Yes  Continue plan of care  [x]  Upgrade activities as tolerated  []  Discharge due to :  []  Other:    Victorina Childers PT    2/22/2023    9:54 AM    Future Appointments   Date Time Provider Alexandre He   2/22/2023  4:00 PM Garlan Alvarado, PT [facility] [address]   2/24/2023  2:30 PM Garlan Alvarado, PT [facility] [address]   2/28/2023  4:30 PM Arilan Alvarado, PT [facility] [address]   3/2/2023  3:30 PM Garlan Alvarado, PT [facility] [address]   3/8/2023  3:30 PM Garlan Alvarado, PT [facility] [address]   3/9/2023  3:30 PM Garlan Alvarado, PT [facility] [address]   3/14/2023  4:00 PM Garlan Alvarado, PT [facility] [address]   3/16/2023  3:30 PM Garlan Alvarado, PT [facility] [address]   3/21/2023  4:00 PM Garlan Alvarado, PT [facility] [address]

## 2023-02-24 ENCOUNTER — APPOINTMENT (OUTPATIENT)
Facility: HOSPITAL | Age: 73
End: 2023-02-24
Payer: COMMERCIAL

## 2023-02-28 ENCOUNTER — HOSPITAL ENCOUNTER (OUTPATIENT)
Facility: HOSPITAL | Age: 73
Setting detail: RECURRING SERIES
End: 2023-02-28
Payer: COMMERCIAL

## 2023-03-02 ENCOUNTER — HOSPITAL ENCOUNTER (OUTPATIENT)
Facility: HOSPITAL | Age: 73
Setting detail: RECURRING SERIES
Discharge: HOME | End: 2023-03-05
Payer: COMMERCIAL

## 2023-03-02 PROCEDURE — 97112 NEUROMUSCULAR REEDUCATION: CPT

## 2023-03-02 PROCEDURE — 97110 THERAPEUTIC EXERCISES: CPT

## 2023-03-02 NOTE — PROGRESS NOTES
PHYSICAL / OCCUPATIONAL THERAPY - DAILY TREATMENT NOTE (updated )    Patient Name: Michelle Roy    Date: 3/2/2023    : 1950  Insurance: Payor: Jenna Sykes 150 / Plan: Carrington Valdez / Product Type: *No Product type* /      Patient  verified Yes     Visit #   Current / Total 5 13   Time   In / Out 3:28 4:00   Pain   In / Out 6 0   Subjective Functional Status/Changes: Notes the shoulder is \"on and off\". Felt good after last session. Thinks it is getting better. Changes to:  Meds, Allergies, Med Hx, Sx Hx? If yes, update Summary List no       TREATMENT AREA =  L shoulder pain and Low back pain    OBJECTIVE     Therapeutic Procedures: Tx Min Billable or 1:1 Min (if diff from Tx Min) Procedure, Rationale, Specifics   15  [id number] Neuromuscular Re-Education (timed):  improve balance, coordination, kinesthetic sense, posture, core stability and proprioception to improve patient's ability to develop conscious control of individual muscles and awareness of position of extremities in order to progress to PLOF and address remaining functional goals. (see flow sheet as applicable)     Details if applicable:     12  76036 Therapeutic Exercise (timed):  increase ROM, strength, coordination, balance, and proprioception to improve patient's ability to progress to PLOF and address remaining functional goals. (see flow sheet as applicable)     Details if applicable:     5   41528 Manual Therapy (timed):  decrease pain, increase ROM, increase tissue extensibility, and decrease trigger points to improve patient's ability to progress to PLOF and address remaining functional goals. The manual therapy interventions were performed at a separate and distinct time from the therapeutic activities interventions .  (see flow sheet as applicable)       Details if applicable:  subscap TPR          Details if applicable:            Details if applicable:     28  [facility] Totals Reminder: bill using total billable min of TIMED therapeutic procedures (example: do not include dry needle or estim unattended, both untimed codes, in totals to left)  8-22 min = 1 unit; 23-37 min = 2 units; 38-52 min = 3 units; 53-67 min = 4 units; 68-82 min = 5 units   Total Total     [x]  Patient Education billed concurrently with other procedures   [x] Review HEP    [] Progressed/Changed HEP, detail:    [] Other detail:       Objective Information/Functional Measures/Assessment    No pain with therex. Pt only reports pain with palpation. No movement exacerbated pain except R arm elevation with adduction. Will assess thoracic and rib mobility NV. Patient will continue to benefit from skilled PT / OT services to modify and progress therapeutic interventions, analyze and address functional mobility deficits, analyze and address ROM deficits, analyze and address strength deficits, analyze and address soft tissue restrictions, analyze and cue for proper movement patterns, and analyze and modify for postural abnormalities to address functional deficits and attain remaining goals. Progress toward goals / Updated goals:  []  See Progress Note/Recertification    No pain with therex.     PLAN  Yes  Continue plan of care  [x]  Upgrade activities as tolerated  []  Discharge due to :  []  Other:    Dwight Melendez PT    3/2/2023    9:14 AM    Future Appointments   Date Time Provider Alexandre He   3/2/2023  3:30 PM Dwight Melendez PT [facility] [address]   3/8/2023  3:30 PM Dwight Melendez PT [facility] [address]   3/9/2023  3:30 PM Dwight Melendez PT [facility] [address]   3/14/2023  4:00 PM Dwight Melendez PT [facility] [address]   3/16/2023  3:30 PM Dwight Melendez PT [facility] [address]   3/21/2023  4:00 PM Dwight Melendez PT [id number]

## 2023-03-08 ENCOUNTER — HOSPITAL ENCOUNTER (OUTPATIENT)
Facility: HOSPITAL | Age: 73
Setting detail: RECURRING SERIES
Discharge: HOME | End: 2023-03-11
Payer: COMMERCIAL

## 2023-03-08 PROCEDURE — 97112 NEUROMUSCULAR REEDUCATION: CPT

## 2023-03-08 PROCEDURE — 97110 THERAPEUTIC EXERCISES: CPT

## 2023-03-08 NOTE — PROGRESS NOTES
PHYSICAL / OCCUPATIONAL THERAPY - DAILY TREATMENT NOTE (updated )    Patient Name: Michelle Roy    Date: 3/8/2023    : 1950  Insurance: Payor: Luna Cassidy / Plan: Carrington Valdez / Product Type: *No Product type* /      Patient  verified Yes     Visit #   Current / Total 6 13   Time   In / Out 3:35 4:04   Pain   In / Out 4 0   Subjective Functional Status/Changes: Reports she is feeling better since last session. Reports the pain comes \"all of a sudden\"   Changes to:  Meds, Allergies, Med Hx, Sx Hx? If yes, update Summary List no       TREATMENT AREA =  L shoulder pain    OBJECTIVE     Therapeutic Procedures: Tx Min Billable or 1:1 Min (if diff from Tx Min) Procedure, Rationale, Specifics   15  [id number] Neuromuscular Re-Education (timed):  improve balance, coordination, kinesthetic sense, posture, core stability and proprioception to improve patient's ability to develop conscious control of individual muscles and awareness of position of extremities in order to progress to PLOF and address remaining functional goals. (see flow sheet as applicable)     Details if applicable:     9  43653 Therapeutic Exercise (timed):  increase ROM, strength, coordination, balance, and proprioception to improve patient's ability to progress to PLOF and address remaining functional goals. (see flow sheet as applicable)     Details if applicable:     5   98694 Manual Therapy (timed):  decrease pain, increase ROM, increase tissue extensibility, and decrease trigger points to improve patient's ability to progress to PLOF and address remaining functional goals. The manual therapy interventions were performed at a separate and distinct time from the therapeutic activities interventions .  (see flow sheet as applicable)       Details if applicable:  post cuff TPR; seated thoracic rotation mobs          Details if applicable:            Details if applicable:     34  [facility] Totals Reminder: bill using total billable min of TIMED therapeutic procedures (example: do not include dry needle or estim unattended, both untimed codes, in totals to left)  8-22 min = 1 unit; 23-37 min = 2 units; 38-52 min = 3 units; 53-67 min = 4 units; 68-82 min = 5 units   Total Total     [x]  Patient Education billed concurrently with other procedures   [x] Review HEP    [] Progressed/Changed HEP, detail:    [] Other detail:       Objective Information/Functional Measures/Assessment    Pain with thoracic rotational mobs. Decreased movement noted. Believe pain is caused by increase tissue tightness; pain relieved by TPR. Patient will continue to benefit from skilled PT / OT services to modify and progress therapeutic interventions, analyze and address functional mobility deficits, analyze and address ROM deficits, analyze and address strength deficits, analyze and address soft tissue restrictions, analyze and cue for proper movement patterns, and analyze and modify for postural abnormalities to address functional deficits and attain remaining goals. Progress toward goals / Updated goals:  []  See Progress Note/Recertification    No pain with therex.     PLAN  Yes  Continue plan of care  [x]  Upgrade activities as tolerated  []  Discharge due to :  []  Other:    Scotty Florez PT    3/8/2023    11:49 AM    Future Appointments   Date Time Provider Alexandre He   3/8/2023  3:30 PM Scotty Florez PT [facility]   3/9/2023  3:30 PM Scotty Florez PT [facility]   3/14/2023  4:00 PM Scotty Florez PT [facility]   3/16/2023  3:30 PM Scot[facility]z PT [facility]   3/21/2023  4:00 PM DANIEL [facility] [id number]

## 2023-03-13 DIAGNOSIS — M25.552 PAIN IN LEFT HIP: ICD-10-CM

## 2023-03-13 DIAGNOSIS — G89.29 OTHER CHRONIC PAIN: ICD-10-CM

## 2023-03-13 DIAGNOSIS — M25.512 CHRONIC LEFT SHOULDER PAIN: Primary | ICD-10-CM

## 2023-03-13 DIAGNOSIS — M25.551 PAIN IN RIGHT HIP: ICD-10-CM

## 2023-03-13 DIAGNOSIS — Z74.09 IMPAIRED MOBILITY AND ADLS: ICD-10-CM

## 2023-03-13 DIAGNOSIS — G89.29 CHRONIC LEFT SHOULDER PAIN: Primary | ICD-10-CM

## 2023-03-13 DIAGNOSIS — Z78.9 IMPAIRED MOBILITY AND ADLS: ICD-10-CM

## 2023-03-14 ENCOUNTER — HOSPITAL ENCOUNTER (OUTPATIENT)
Facility: HOSPITAL | Age: 73
Setting detail: RECURRING SERIES
Discharge: HOME | End: 2023-03-17
Payer: COMMERCIAL

## 2023-03-14 PROCEDURE — 97535 SELF CARE MNGMENT TRAINING: CPT

## 2023-03-14 PROCEDURE — 97110 THERAPEUTIC EXERCISES: CPT

## 2023-03-14 PROCEDURE — 97112 NEUROMUSCULAR REEDUCATION: CPT

## 2023-03-14 NOTE — PROGRESS NOTES
PHYSICAL / OCCUPATIONAL THERAPY - DAILY TREATMENT NOTE (updated )    Patient Name: Elaine Marin    Date: 3/14/2023    : 1950  Insurance: Payor: Elo Pabon / Plan: [place]  / Product Type: *No Product type* /      Patient  verified Yes     Visit #   Current / Total 7 13   Time   In / Out 4:00 4:39   Pain   In / Out 3 0   Subjective Functional Status/Changes: Notes the shoulder is feeling better. Reports the hips are the big issue. Changes to:  Meds, Allergies, Med Hx, Sx Hx? If yes, update Summary List no       TREATMENT AREA =  L shoulder pain    OBJECTIVE     Therapeutic Procedures: Tx Min Billable or 1:1 Min (if diff from Tx Min) Procedure, Rationale, Specifics   10  [id number] Neuromuscular Re-Education (timed):  improve balance, coordination, kinesthetic sense, posture, core stability and proprioception to improve patient's ability to develop conscious control of individual muscles and awareness of position of extremities in order to progress to PLOF and address remaining functional goals. (see flow sheet as applicable)     Details if applicable:     10  08084 Therapeutic Exercise (timed):  increase ROM, strength, coordination, balance, and proprioception to improve patient's ability to progress to PLOF and address remaining functional goals. (see flow sheet as applicable)     Details if applicable:     4   73464 Manual Therapy (timed):  decrease pain, increase ROM, increase tissue extensibility, and decrease trigger points to improve patient's ability to progress to PLOF and address remaining functional goals. The manual therapy interventions were performed at a separate and distinct time from the therapeutic activities interventions .  (see flow sheet as applicable)       Details if applicable:  post cuff TPR   10   80332 Self Care/Home Management (timed):  improve patient knowledge and understanding of positioning, posture/ergonomics, activity modification, and physical therapy expectations, procedures and progression  to improve patient's ability to progress to PLOF and address remaining functional goals. (see flow sheet as applicable)     Details if applicable:  discussion about POC, putting pt on hold, referral for low back and hips. Details if applicable:     44  [facility] Totals Reminder: bill using total billable min of TIMED therapeutic procedures (example: do not include dry needle or estim unattended, both untimed codes, in totals to left)  8-22 min = 1 unit; 23-37 min = 2 units; 38-52 min = 3 units; 53-67 min = 4 units; 68-82 min = 5 units   Total Total     [x]  Patient Education billed concurrently with other procedures   [x] Review HEP    [] Progressed/Changed HEP, detail:    [] Other detail:       Objective Information/Functional Measures/Assessment    No pain upon leaving. Tolerated all therex well Pt would like to be put on hold for now to see how she does I with HEP to control symptoms. Pt is aware that if she does not return within 30 days, she will be Dc'd. Patient will continue to benefit from skilled PT / OT services to modify and progress therapeutic interventions, analyze and address functional mobility deficits, analyze and address ROM deficits, analyze and address strength deficits, analyze and address soft tissue restrictions, analyze and cue for proper movement patterns, and analyze and modify for postural abnormalities to address functional deficits and attain remaining goals. Progress toward goals / Updated goals:  []  See Progress Note/Recertification    Pt to be put on hold to assess I compliance with HEP and symptom management.     PLAN  Yes  Continue plan of care  []  Upgrade activities as tolerated  []  Discharge due to :  []  Other:    Christina Larry PT    3/14/2023    12:35 PM    Future Appointments   Date Time Provider Alexandre He   3/14/2023  4:00 PM Christina Larry PT [facility]   3/16/2023  3:30 PM Christina Larry PT [facility]   3/21/2023  4:00 PM Cathy Baird Rylee Guevara, [address] [facility]

## 2023-03-21 ENCOUNTER — APPOINTMENT (OUTPATIENT)
Facility: HOSPITAL | Age: 73
End: 2023-03-21
Payer: COMMERCIAL

## 2023-03-29 LAB — LEFT VENTRICULAR EJECTION FRACTION, EXTERNAL: NORMAL

## 2023-04-04 DIAGNOSIS — I10 ESSENTIAL (PRIMARY) HYPERTENSION: Primary | ICD-10-CM

## 2023-04-05 RX ORDER — LISINOPRIL AND HYDROCHLOROTHIAZIDE 20; 12.5 MG/1; MG/1
TABLET ORAL
Qty: 90 TABLET | Refills: 3 | Status: SHIPPED | OUTPATIENT
Start: 2023-04-05

## 2023-05-22 ENCOUNTER — OFFICE VISIT (OUTPATIENT)
Dept: FAMILY MEDICINE CLINIC | Facility: CLINIC | Age: 73
End: 2023-05-22
Payer: COMMERCIAL

## 2023-05-22 VITALS
BODY MASS INDEX: 29.19 KG/M2 | HEIGHT: 62 IN | SYSTOLIC BLOOD PRESSURE: 122 MMHG | WEIGHT: 158.6 LBS | TEMPERATURE: 98 F | DIASTOLIC BLOOD PRESSURE: 78 MMHG | RESPIRATION RATE: 16 BRPM | OXYGEN SATURATION: 98 % | HEART RATE: 69 BPM

## 2023-05-22 DIAGNOSIS — I10 ESSENTIAL (PRIMARY) HYPERTENSION: ICD-10-CM

## 2023-05-22 DIAGNOSIS — E78.2 MIXED HYPERLIPIDEMIA: ICD-10-CM

## 2023-05-22 DIAGNOSIS — M81.0 AGE-RELATED OSTEOPOROSIS WITHOUT CURRENT PATHOLOGICAL FRACTURE: ICD-10-CM

## 2023-05-22 DIAGNOSIS — E11.40 TYPE 2 DIABETES MELLITUS WITH DIABETIC NEUROPATHY, WITHOUT LONG-TERM CURRENT USE OF INSULIN (HCC): Primary | ICD-10-CM

## 2023-05-22 PROCEDURE — 99214 OFFICE O/P EST MOD 30 MIN: CPT | Performed by: LEGAL MEDICINE

## 2023-05-22 PROCEDURE — 3074F SYST BP LT 130 MM HG: CPT | Performed by: LEGAL MEDICINE

## 2023-05-22 PROCEDURE — 3078F DIAST BP <80 MM HG: CPT | Performed by: LEGAL MEDICINE

## 2023-05-22 PROCEDURE — 1123F ACP DISCUSS/DSCN MKR DOCD: CPT | Performed by: LEGAL MEDICINE

## 2023-05-22 PROCEDURE — 3044F HG A1C LEVEL LT 7.0%: CPT | Performed by: LEGAL MEDICINE

## 2023-05-22 RX ORDER — GABAPENTIN 100 MG/1
100 CAPSULE ORAL 3 TIMES DAILY
Qty: 270 CAPSULE | Refills: 3 | Status: SHIPPED | OUTPATIENT
Start: 2023-05-22 | End: 2023-08-20

## 2023-05-22 RX ORDER — ATORVASTATIN CALCIUM 10 MG/1
10 TABLET, FILM COATED ORAL DAILY
Qty: 90 TABLET | Refills: 3 | Status: SHIPPED | OUTPATIENT
Start: 2023-05-22 | End: 2023-08-20

## 2023-05-22 SDOH — ECONOMIC STABILITY: HOUSING INSECURITY
IN THE LAST 12 MONTHS, WAS THERE A TIME WHEN YOU DID NOT HAVE A STEADY PLACE TO SLEEP OR SLEPT IN A SHELTER (INCLUDING NOW)?: NO

## 2023-05-22 SDOH — ECONOMIC STABILITY: FOOD INSECURITY: WITHIN THE PAST 12 MONTHS, THE FOOD YOU BOUGHT JUST DIDN'T LAST AND YOU DIDN'T HAVE MONEY TO GET MORE.: NEVER TRUE

## 2023-05-22 SDOH — ECONOMIC STABILITY: INCOME INSECURITY: HOW HARD IS IT FOR YOU TO PAY FOR THE VERY BASICS LIKE FOOD, HOUSING, MEDICAL CARE, AND HEATING?: NOT HARD AT ALL

## 2023-05-22 SDOH — ECONOMIC STABILITY: FOOD INSECURITY: WITHIN THE PAST 12 MONTHS, YOU WORRIED THAT YOUR FOOD WOULD RUN OUT BEFORE YOU GOT MONEY TO BUY MORE.: NEVER TRUE

## 2023-05-22 ASSESSMENT — PATIENT HEALTH QUESTIONNAIRE - PHQ9
2. FEELING DOWN, DEPRESSED OR HOPELESS: 0
SUM OF ALL RESPONSES TO PHQ QUESTIONS 1-9: 0
SUM OF ALL RESPONSES TO PHQ QUESTIONS 1-9: 0
SUM OF ALL RESPONSES TO PHQ9 QUESTIONS 1 & 2: 0
SUM OF ALL RESPONSES TO PHQ QUESTIONS 1-9: 0
1. LITTLE INTEREST OR PLEASURE IN DOING THINGS: 0
SUM OF ALL RESPONSES TO PHQ QUESTIONS 1-9: 0

## 2023-05-22 ASSESSMENT — ANXIETY QUESTIONNAIRES
2. NOT BEING ABLE TO STOP OR CONTROL WORRYING: 0
IF YOU CHECKED OFF ANY PROBLEMS ON THIS QUESTIONNAIRE, HOW DIFFICULT HAVE THESE PROBLEMS MADE IT FOR YOU TO DO YOUR WORK, TAKE CARE OF THINGS AT HOME, OR GET ALONG WITH OTHER PEOPLE: NOT DIFFICULT AT ALL
7. FEELING AFRAID AS IF SOMETHING AWFUL MIGHT HAPPEN: 0
3. WORRYING TOO MUCH ABOUT DIFFERENT THINGS: 0
5. BEING SO RESTLESS THAT IT IS HARD TO SIT STILL: 0
6. BECOMING EASILY ANNOYED OR IRRITABLE: 0
GAD7 TOTAL SCORE: 0
4. TROUBLE RELAXING: 0
1. FEELING NERVOUS, ANXIOUS, OR ON EDGE: 0

## 2023-05-22 ASSESSMENT — ENCOUNTER SYMPTOMS
SHORTNESS OF BREATH: 0
NAUSEA: 0
FACIAL SWELLING: 0
BLOOD IN STOOL: 0
CHEST TIGHTNESS: 0
COUGH: 0
EYE DISCHARGE: 0
SORE THROAT: 0
ANAL BLEEDING: 0
CONSTIPATION: 0
EYE PAIN: 0
APNEA: 0
BACK PAIN: 0
EYE REDNESS: 0
CHOKING: 0
DIARRHEA: 0
WHEEZING: 0
VOMITING: 0
ABDOMINAL PAIN: 0
EYE ITCHING: 0

## 2023-05-22 NOTE — PROGRESS NOTES
Colt Michele     Chief Complaint   Patient presents with    Back Pain     /78   Pulse 69   Temp 98 °F (36.7 °C) (Temporal)   Resp 16   Ht 5' 1.5\" (1.562 m)   Wt 158 lb 9.6 oz (71.9 kg)   SpO2 98%   BMI 29.48 kg/m²         HPI:  Colt Michele is here with her granddaughter for burning both feet and legs especially at night it has lilly going on for few weeks with no improvement     Past Medical History:   Diagnosis Date    Diabetes ([address]     Hypercholesteremia     Hypertension      Past Surgical History:   Procedure Laterality Date    CHOLECYSTECTOMY       Social History     Tobacco Use    Smoking status: Never    Smokeless tobacco: Never   Substance Use Topics    Alcohol use: No       Family History   Problem Relation Age of Onset    Hypertension Mother     Diabetes Mother        Review of Systems   Constitutional:  Negative for activity change, appetite change, chills, diaphoresis, fatigue and fever. HENT:  Negative for congestion, ear discharge, ear pain, facial swelling, hearing loss and sore throat. Eyes:  Negative for pain, discharge, redness and itching. Respiratory:  Negative for apnea, cough, choking, chest tightness, shortness of breath and wheezing. Gastrointestinal:  Negative for abdominal pain, anal bleeding, blood in stool, constipation, diarrhea, nausea and vomiting. Endocrine: Negative for cold intolerance and heat intolerance. Genitourinary:  Negative for difficulty urinating, dysuria and flank pain. Musculoskeletal:  Positive for arthralgias, gait problem and myalgias. Negative for back pain, joint swelling and neck stiffness. Skin:  Negative for rash. Neurological:  Positive for numbness. Negative for dizziness, light-headedness and headaches. Psychiatric/Behavioral:  Negative for agitation, behavioral problems, confusion, decreased concentration, dysphoric mood, sleep disturbance and suicidal ideas. The patient is not nervous/anxious.         Physical

## 2023-05-22 NOTE — PROGRESS NOTES
Colt Michele is a 74 y.o. female (: 1950) presenting to address:    Chief Complaint   Patient presents with    Back Pain       Vitals:    23 0941   BP: 122/78   Pulse: 69   Resp: 16   Temp: 98 °F (36.7 °C)   SpO2: 98%       Coordination of Care Questionaire:   1. \"Have you been to the ER, urgent care clinic since your last visit? Hospitalized since your last visit? \" No    2. \"Have you seen or consulted any other health care providers outside of the [address] [place] [address] since your last visit? \" No     3. For patients aged 39-70: Has the patient had a colonoscopy / FIT/ Cologuard? No      If the patient is female:    4. For patients aged 41-77: Has the patient had a mammogram within the past 2 years? No      5. For patients aged 21-65: Has the patient had a pap smear? NA - based on age or sex    Advanced Directive:   1. Do you have an Advanced Directive? No    2. Would you like information on Advanced Directives?  No

## 2023-06-19 ENCOUNTER — OFFICE VISIT (OUTPATIENT)
Age: 73
End: 2023-06-19
Payer: MEDICAID

## 2023-06-19 VITALS — HEIGHT: 61 IN | BODY MASS INDEX: 29.97 KG/M2

## 2023-06-19 DIAGNOSIS — M54.42 CHRONIC BILATERAL LOW BACK PAIN WITH BILATERAL SCIATICA: Primary | ICD-10-CM

## 2023-06-19 DIAGNOSIS — M54.41 CHRONIC BILATERAL LOW BACK PAIN WITH BILATERAL SCIATICA: Primary | ICD-10-CM

## 2023-06-19 DIAGNOSIS — G89.29 CHRONIC BILATERAL LOW BACK PAIN WITH BILATERAL SCIATICA: Primary | ICD-10-CM

## 2023-06-19 PROCEDURE — 1123F ACP DISCUSS/DSCN MKR DOCD: CPT | Performed by: FAMILY MEDICINE

## 2023-06-19 PROCEDURE — 99204 OFFICE O/P NEW MOD 45 MIN: CPT | Performed by: FAMILY MEDICINE

## 2023-06-19 ASSESSMENT — PATIENT HEALTH QUESTIONNAIRE - PHQ9
2. FEELING DOWN, DEPRESSED OR HOPELESS: 0
SUM OF ALL RESPONSES TO PHQ QUESTIONS 1-9: 0
SUM OF ALL RESPONSES TO PHQ QUESTIONS 1-9: 0
SUM OF ALL RESPONSES TO PHQ9 QUESTIONS 1 & 2: 0
SUM OF ALL RESPONSES TO PHQ QUESTIONS 1-9: 0
SUM OF ALL RESPONSES TO PHQ QUESTIONS 1-9: 0
1. LITTLE INTEREST OR PLEASURE IN DOING THINGS: 0

## 2023-06-19 NOTE — PATIENT INSTRUCTIONS
- xrays on your way out today  - work on the stretches below, at least 1-2x/day to help with flexibility    If things are not getting better after 4-6 weeks, let us know and we can put a referral in for formal physical therapy    VISIT SURVEY       You may receive a survey regarding your visit today either by mail or email. Please take the opportunity to let us know how we did. This information helps us continue to improve and provide a great patient experience. TESTING / IMAGING RESULTS       If you have [employer] access:  Per federal regulations all of your results will be released to you and to your physician / provider simultaneously on [address] This means that it is likely that you will have the opportunity to review your results before your physician / provider! Since all \"critical\" abnormal results are immediately called to the office / on-call providers on nights, weekends and holidays - please refrain from calling after hours when you receive abnormal results through [address] Instead, allow time for your physician / provider to review your results and then provide interpretation and/or guidance. If the results are significantly abnormal and require time-sensitive action - guidance will be provided both via [employer] and via telephone. For all other results (interpreted as \"normal\", \"abnormal but expected\", \"reassuring / stable\", \"slightly abnormal\") - non-urgent guidance will be provided via [employer] communication only. [employer] Help Desk # [phone]    If you do NOT have [facility] access: If the results are significantly abnormal and require time-sensitive action - guidance will be relayed to you via telephone. For all other results (interpreted as \"normal\", \"abnormal but expected\", \"reassuring / stable\", \"slightly abnormal\") - non-urgent guidance will be mailed to you via [employer]      Results from Outside Facilities / Laboratories:  Results of tests performed at outside

## 2023-06-19 NOTE — PROGRESS NOTES
[facility]  Sports Medicine Consultation Note    PCP: Capo Smith MD  Requesting provider: Capo Smith MD     Merry Pires is a 74 y.o. female (: 1950) presenting to obtain consultative services regarding:  Chief Complaint   Patient presents with    Lower Back Pain    Hip Pain       Assessment & Plan          ICD-10-CM    1. Chronic bilateral low back pain with bilateral sciatica  M54.42 XR LUMBAR SPINE (MIN 6 VIEWS)    M54.41     G89.29           Impression:  Multifactorial low back pain with bilateral sciatica, likely 2/2 very tight hamstrings and glutes (perhaps piriformis syndrome component)  However cannot rule out radiculopathy    Plan:  Educated patient at length re: condition(s). > radiograph(s) lumbar  > reviewed bilateral hip radiograph(s) which were unremarkable  > discussed importance of hamstring lengthening; curated home exercise plan   > offered formal PT but patient declined at this time      Orders Placed This Encounter   Procedures    XR LUMBAR SPINE (MIN 6 VIEWS)     STANDING AP, lateral, bilateral obliques, flexion/extension views. Standing Status:   Future     Number of Occurrences:   1     Standing Expiration Date:   2024     Scheduling Instructions:      [facility]       Total time spent caring for the patient today was 45min. This includes time spent before the visit reviewing the chart, time spent during the visit, and time spent after the visit on documentation, etc.      Management plan & patient instructions discussed with Merry Pires, who voiced understanding. This document may have been created with the aid of dictation software. Text may contain errors, particularly phonetic errors. Thank you for the opportunity to participate in the care of this patient. If any questions or concerns at all, please feel free to contact me.     Edmundo Gonzalez MD  Internal Medicine, Family Medicine & Sports Medicine  2023      Subjective   History:

## 2023-06-19 NOTE — PROGRESS NOTES
Kimberley Ryan presents today for   Chief Complaint   Patient presents with    Lower Back Pain    Hip Pain       Is someone accompanying this pt? yes    Is the patient using any DME equipment during OV? no    Depression Screening:  No flowsheet data found. Learning Assessment:  No flowsheet data found. Abuse Screening:  No flowsheet data found. Fall Risk  No flowsheet data found. OPIOID RISK TOOL  No flowsheet data found. Coordination of Care:  1. Have you been to the ER, urgent care clinic since your last visit? no  Hospitalized since your last visit? no    2. Have you seen or consulted any other health care providers outside of the [address] [place] [address] since your last visit? no Include any pap smears or colon screening.  no

## 2023-07-06 DIAGNOSIS — S32.030S COMPRESSION FRACTURE OF L3 VERTEBRA, SEQUELA: ICD-10-CM

## 2023-07-06 DIAGNOSIS — E78.2 MIXED HYPERLIPIDEMIA: ICD-10-CM

## 2023-07-06 DIAGNOSIS — M81.0 AGE-RELATED OSTEOPOROSIS WITHOUT CURRENT PATHOLOGICAL FRACTURE: ICD-10-CM

## 2023-07-06 DIAGNOSIS — E11.40 TYPE 2 DIABETES MELLITUS WITH DIABETIC NEUROPATHY, WITHOUT LONG-TERM CURRENT USE OF INSULIN (HCC): Primary | ICD-10-CM

## 2023-07-06 DIAGNOSIS — M51.36 DDD (DEGENERATIVE DISC DISEASE), LUMBAR: ICD-10-CM

## 2023-07-06 DIAGNOSIS — M54.42 CHRONIC BILATERAL LOW BACK PAIN WITH BILATERAL SCIATICA: ICD-10-CM

## 2023-07-06 DIAGNOSIS — I10 ESSENTIAL (PRIMARY) HYPERTENSION: ICD-10-CM

## 2023-07-06 DIAGNOSIS — E55.9 VITAMIN D DEFICIENCY, UNSPECIFIED: ICD-10-CM

## 2023-07-06 DIAGNOSIS — M54.41 CHRONIC BILATERAL LOW BACK PAIN WITH BILATERAL SCIATICA: ICD-10-CM

## 2023-07-06 DIAGNOSIS — G89.29 CHRONIC BILATERAL LOW BACK PAIN WITH BILATERAL SCIATICA: ICD-10-CM

## 2023-07-06 RX ORDER — ALENDRONATE SODIUM 70 MG/1
70 TABLET ORAL
Qty: 12 TABLET | Refills: 3 | Status: SHIPPED | OUTPATIENT
Start: 2023-07-06

## 2023-07-06 NOTE — PROGRESS NOTES
Daughter called to ask about her mother X ray results done on 6/19/23 ! !  I have discussed the results with the patient daughter she will be sent Fosamax osteoporosis and physical therapy,consider MRI of spine if pain not improving.

## 2023-07-18 DIAGNOSIS — E78.2 MIXED HYPERLIPIDEMIA: ICD-10-CM

## 2023-07-18 DIAGNOSIS — E11.40 TYPE 2 DIABETES MELLITUS WITH DIABETIC NEUROPATHY, WITHOUT LONG-TERM CURRENT USE OF INSULIN (HCC): Primary | ICD-10-CM

## 2023-07-18 DIAGNOSIS — I10 ESSENTIAL (PRIMARY) HYPERTENSION: ICD-10-CM

## 2023-07-18 DIAGNOSIS — M81.0 AGE-RELATED OSTEOPOROSIS WITHOUT CURRENT PATHOLOGICAL FRACTURE: ICD-10-CM

## 2023-07-18 DIAGNOSIS — E55.9 VITAMIN D DEFICIENCY, UNSPECIFIED: ICD-10-CM

## 2023-07-26 ENCOUNTER — HOSPITAL ENCOUNTER (OUTPATIENT)
Facility: HOSPITAL | Age: 73
Setting detail: RECURRING SERIES
Discharge: HOME | End: 2023-07-29
Payer: MEDICAID

## 2023-07-26 PROCEDURE — 97163 PT EVAL HIGH COMPLEX 45 MIN: CPT

## 2023-07-26 NOTE — PROGRESS NOTES
PHYSICAL / OCCUPATIONAL THERAPY - DAILY TREATMENT NOTE (updated )  For Eval visit    Patient Name: Kathryn Malik    Date: 2023    : 1950  Insurance: Payor: Irvin Piña / Plan: Marilee Oliva / Product Type: *No Product type* /      Patient  verified yes     Visit #   Current / Total 1 16   Time   In / Out 140 220   Pain   In / Out 7/10 7/10   Subjective Functional Status/Changes: See POC     TREATMENT AREA =  Other low back pain [M54.59]    OBJECTIVE           35 min   Eval - untimed                      Therapeutic Procedures: Tx Min Billable or 1:1 Min (if diff from Tx Min) Procedure, Rationale, Specifics   5 (NC 5 94790 Self Care/Home Management (timed):  improve patient knowledge and understanding of pain reducing techniques, positioning, posture/ergonomics, home safety, activity modification, diagnosis/prognosis, and physical therapy expectations, procedures and progression  to improve patient's ability to progress to PLOF and address remaining functional goals. (see flow sheet as applicable)     Details if applicable:    Reviewed diagnosis, prognosis, therapy progression   Reviewed and educated on HEP   Edu on log rolling for getting in and out of bed           Details if applicable:            Details if applicable:            Details if applicable:     5 5 [facility] Totals Reminder: bill using total billable min of TIMED therapeutic procedures (example: do not include dry needle or estim unattended, both untimed codes, in totals to left)  8-22 min = 1 unit; 23-37 min = 2 units; 38-52 min = 3 units; 53-67 min = 4 units; 68-82 min = 5 units   Total Total     [x]  Patient Education billed concurrently with other procedures   [x] Review HEP    [x] Progressed/Changed HEP, detail:    Access Code: [email or web address]  URL: [email or web address]  Date: 2023  Prepared by: Oliver Pacheco    Exercises  - Sitting to Supine Roll  - 1-2 x daily - 7 x

## 2023-07-26 NOTE — THERAPY EVALUATION
services addressing the current ROM, strength, functional performance, and balance impairments so that the patient to return to performing all ADLs with minimal restriction/limitation or without any functional limitations. HEP provided to the patient in order to promote improvement and self management of symptoms and functional performance     Evaluation Complexity:  History:  HIGH Complexity :3+ comorbidities / personal factors will impact the outcome/ POC ; Examination:  HIGH Complexity : 4+ Standardized tests and measures addressing body structure, function, activity limitation and / or participation in recreation  ;Presentation:  HIGH Complexity : Unstable and unpredictable characteristics  ; Clinical Decision Making:  HIGH Complexity : FOTO score of 1- 25  FOTO score = an established functional score where 100 = no disability  Overall Complexity Rating: HIGH   Problem List: pain affecting function, decrease ROM, decrease strength, edema affection function, impaired gait/balance, decrease ADL/functional abilities, decrease activity tolerance, decrease flexibility/joint mobility, and decrease transfer abilities    Treatment Plan may include any combination of the followin Therapeutic Exercise, 67615 Neuromuscular Re-Education, 90031 Manual Therapy, 03062 Therapeutic Activity, 69811 Self Care/Home Management, 41544 Electrical Stim unattended, 83378 Electrical Stim attended, 41756 Vasopneumatic Device, 50426 Aquatic Therapy, [id number] Gait Training, [id number] Ultrasound, [id number] Mechanical Traction, [id number] Needle Insertion w/o Injection (1 or 2 muscles), and 09656 Needle Insertion w/o Injection (3+ muscles)  Patient / Family readiness to learn indicated by: asking questions, trying to perform skills, interest, return verbalization , and return demonstration   Persons(s) to be included in education: patient (P) and family support person (FSP); list daughter  Barriers to Learning/Limitations: language  Measures taken

## 2023-08-01 ENCOUNTER — HOSPITAL ENCOUNTER (OUTPATIENT)
Facility: HOSPITAL | Age: 73
Setting detail: RECURRING SERIES
End: 2023-08-01
Payer: MEDICAID

## 2023-08-03 ENCOUNTER — HOSPITAL ENCOUNTER (OUTPATIENT)
Facility: HOSPITAL | Age: 73
Setting detail: RECURRING SERIES
Discharge: HOME | End: 2023-08-06
Payer: MEDICAID

## 2023-08-03 PROCEDURE — 97112 NEUROMUSCULAR REEDUCATION: CPT

## 2023-08-03 PROCEDURE — 97535 SELF CARE MNGMENT TRAINING: CPT

## 2023-08-03 PROCEDURE — 97110 THERAPEUTIC EXERCISES: CPT

## 2023-08-03 NOTE — PROGRESS NOTES
Procedures: Tx Min Billable or 1:1 Min (if diff from Tx Min) Procedure, Rationale, Specifics   10  48330 Therapeutic Exercise (timed):  increase ROM, strength, coordination, balance, and proprioception to improve patient's ability to progress to PLOF and address remaining functional goals. (see flow sheet as applicable)     Details if applicable:      []   assessing strength/ROM  []   assessing activity performance (ex: sit to stand, stair negotiation)  []   assessing balance/control (ex. Roberts, DGI, SLS)   13  [id number] Neuromuscular Re-Education (timed):  improve balance, coordination, kinesthetic sense, posture, core stability and proprioception to improve patient's ability to develop conscious control of individual muscles and awareness of position of extremities in order to progress to PLOF and address remaining functional goals. (see flow sheet as applicable)     Details if applicable:      []   assessing strength/ROM  []   assessing activity performance (ex: sit to stand, stair negotiation)  []   assessing balance/control (ex. Roberts, DGI, SLS)   10  92454 Self Care/Home Management (timed):  improve patient knowledge and understanding of pain reducing techniques, positioning, posture/ergonomics, home safety, activity modification, diagnosis/prognosis, and physical therapy expectations, procedures and progression  to improve patient's ability to progress to PLOF and address remaining functional goals. (see flow sheet as applicable)     Details if applicable:    Edu on ice vs heat for pain relief, nature of pathology     []   assessing strength/ROM  []   assessing activity performance (ex: sit to stand, stair negotiation)  []   assessing balance/control (ex. Roberts, DGI, SLS)   7 (NC) 38060 Manual Therapy (timed):  decrease pain and increase tissue extensibility to improve patient's ability to progress to PLOF and address remaining functional goals.   The manual therapy interventions were performed at a separate and

## 2023-08-08 ENCOUNTER — HOSPITAL ENCOUNTER (OUTPATIENT)
Facility: HOSPITAL | Age: 73
Setting detail: RECURRING SERIES
Discharge: HOME | End: 2023-08-11
Payer: MEDICAID

## 2023-08-08 PROCEDURE — 97112 NEUROMUSCULAR REEDUCATION: CPT

## 2023-08-08 PROCEDURE — 97110 THERAPEUTIC EXERCISES: CPT

## 2023-08-08 NOTE — PROGRESS NOTES
flow sheet as applicable)     Details if applicable:    S/L MFR L and R glut and low back           Details if applicable:     28  [facility] Totals Reminder: bill using total billable min of TIMED therapeutic procedures (example: do not include dry needle or estim unattended, both untimed codes, in totals to left)  8-22 min = 1 unit; 23-37 min = 2 units; 38-52 min = 3 units; 53-67 min = 4 units; 68-82 min = 5 units   Total Total     [x]  Patient Education billed concurrently with other procedures   [x] Review HEP    [] Progressed/Changed HEP, detail:    [] Other detail:       Objective Information/Functional Measures/Assessment    Pt shown how to self manage self massage at home via seated self STM of the thighs and hips for desensitization and MFR. Pt was much more able to perform the exercises today without sig incr in pain. Initiated supine hip Er stretch, seated HS stretch, TrA SLR, row to TB, and STS from Hi<>LOW. Initially hesitant about doing STS, but pt was encouraged to try performing from a higher table at 21'. Did well with today's session. Patient will continue to benefit from skilled PT / OT services to modify and progress therapeutic interventions, analyze and address functional mobility deficits, analyze and address ROM deficits, analyze and address strength deficits, analyze and address soft tissue restrictions, analyze and cue for proper movement patterns, analyze and modify for postural abnormalities, and instruct in home and community integration to address functional deficits and attain remaining goals. Progress toward goals / Updated goals:  []  See Progress Note/Recertification    Short Term Goals: To be accomplished in 4 weeks  1) Pt will be IND with HEP to facilitate self care management.    EVAL: Provided HEP  Current Status: -  Goal Met?  -   2) Pt will improve lumbar ROM to >50% with less than 6/10 pain     EVAL: Thoracolumbar AROM  Flexion: 50%   Extension: NT  L SB: 50%   R SB: 30%

## 2023-08-15 ENCOUNTER — HOSPITAL ENCOUNTER (OUTPATIENT)
Facility: HOSPITAL | Age: 73
Setting detail: RECURRING SERIES
End: 2023-08-15
Payer: MEDICAID

## 2023-08-22 ENCOUNTER — TELEPHONE (OUTPATIENT)
Facility: HOSPITAL | Age: 73
End: 2023-08-22

## 2023-08-22 ENCOUNTER — HOSPITAL ENCOUNTER (OUTPATIENT)
Facility: HOSPITAL | Age: 73
Setting detail: RECURRING SERIES
End: 2023-08-22
Payer: MEDICAID

## 2023-08-22 NOTE — TELEPHONE ENCOUNTER
Called pt in regard to NS 8/22 w/ Zev. She stated she felt sick and asked her daughter to call us this morning to CX but is unsure if she actually did. She also CX her 8/24 appt due to transportation issues. I reminded her of our NS/DC policy as well.

## 2023-08-24 ENCOUNTER — APPOINTMENT (OUTPATIENT)
Facility: HOSPITAL | Age: 73
End: 2023-08-24
Payer: MEDICAID

## 2023-08-31 ENCOUNTER — HOSPITAL ENCOUNTER (OUTPATIENT)
Facility: HOSPITAL | Age: 73
Setting detail: RECURRING SERIES
End: 2023-08-31
Payer: MEDICAID

## 2023-08-31 PROCEDURE — 97112 NEUROMUSCULAR REEDUCATION: CPT

## 2023-08-31 PROCEDURE — 97140 MANUAL THERAPY 1/> REGIONS: CPT

## 2023-08-31 PROCEDURE — 97110 THERAPEUTIC EXERCISES: CPT

## 2023-08-31 NOTE — PROGRESS NOTES
PHYSICAL / OCCUPATIONAL THERAPY - DAILY TREATMENT NOTE (updated )    Patient Name: Brianna Hall.    Date: 2023    : 1950  Insurance: Payor: Aren Tripp / Plan: Vinicius Cook / Product Type: *No Product type* /      Patient  verified yes     Visit #   Current / Total 4 15   Time   In / Out 2:20 pm 3:00 pm   Pain   In / Out 6.5 3   Subjective Functional Status/Changes: Patient reports feeling sore and stiff. She has been out of PT due to an eye procedure. TREATMENT AREA =  Other low back pain [M54.59]    OBJECTIVE    Therapeutic Procedures: Tx Min Billable or 1:1 Min (if diff from Tx Min) Procedure, Rationale, Specifics   15  11346 Therapeutic Exercise (timed):  increase ROM, strength, coordination, balance, and proprioception to improve patient's ability to progress to PLOF and address remaining functional goals. (see flow sheet as applicable)     Details if applicable:       15  87373 Neuromuscular Re-Education (timed):  improve balance, coordination, kinesthetic sense, posture, core stability and proprioception to improve patient's ability to develop conscious control of individual muscles and awareness of position of extremities in order to progress to PLOF and address remaining functional goals. (see flow sheet as applicable)     Details if applicable:     10  93549 Manual Therapy (timed):  decrease pain, increase tissue extensibility, and decrease trigger points to improve patient's ability to progress to PLOF and address remaining functional goals. The manual therapy interventions were performed at a separate and distinct time from the therapeutic activities interventions .  (see flow sheet as applicable)     Details if applicable:     36  [facility] Totals Reminder: bill using total billable min of TIMED therapeutic procedures (example: do not include dry needle or estim unattended, both untimed codes, in totals to left)  8-22 min = 1 unit; 23-37 min = 2 units; 38-52 min = 3 units; 53-67 min = 4 units; 68-82 min = 5 units   Total Total     [x]  Patient Education billed concurrently with other procedures   [x] Review HEP    [] Progressed/Changed HEP, detail:    [] Other detail:       Objective Information/Functional Measures/Assessment    Verbal or Tactile cues required: for all therex. Demo used throughout. Posture/positioning: increased lumbar lordosis noted due to body habitus   ROM: improving   Palpation: Left sided paraspinal severe hypertonicity     Therex and NMR as per flow sheet. Patient will continue to benefit from skilled PT / OT services to modify and progress therapeutic interventions, analyze and address functional mobility deficits, analyze and address ROM deficits, analyze and address strength deficits, analyze and address soft tissue restrictions, and analyze and cue for proper movement patterns to address functional deficits and attain remaining goals. Progress toward goals / Updated goals:  []  See Progress Note/Recertification    Patient tolerated today's treatment well. Progressed/added therex as follows: no progressions this visit. Discussed HEP and continued care until son goes back to [place] with patient. Patient is agreeable. Continue to progress as tolerated. Patient making good progress towards all goals at this time. All goals remain applicable.      PLAN  yes Continue plan of care  [x]  Upgrade activities as tolerated  []  Discharge due to :  []  Other:    Rickie Parekh PT    8/31/2023    7:49 AM    Future Appointments   Date Time Provider 0500  46[facility]   8/31/2023  2:20 PM Rickie Parekh, PT [address] [facility]

## 2023-09-01 ENCOUNTER — HOSPITAL ENCOUNTER (OUTPATIENT)
Facility: HOSPITAL | Age: 73
Setting detail: RECURRING SERIES
Discharge: HOME | End: 2023-09-04
Payer: MEDICAID

## 2023-09-01 PROCEDURE — 97110 THERAPEUTIC EXERCISES: CPT

## 2023-09-01 PROCEDURE — 97112 NEUROMUSCULAR REEDUCATION: CPT

## 2023-09-01 PROCEDURE — G0283 ELEC STIM OTHER THAN WOUND: HCPCS

## 2023-09-01 NOTE — PROGRESS NOTES
increase ROM, strength, coordination, balance, and proprioception to improve patient's ability to progress to PLOF and address remaining functional goals. (see flow sheet as applicable)     Details if applicable:       15  25509 Neuromuscular Re-Education (timed):  improve balance, coordination, kinesthetic sense, posture, core stability and proprioception to improve patient's ability to develop conscious control of individual muscles and awareness of position of extremities in order to progress to PLOF and address remaining functional goals. (see flow sheet as applicable)     Details if applicable:     5  26961 Manual Therapy (timed):  decrease pain, increase ROM, and increase tissue extensibility to improve patient's ability to progress to PLOF and address remaining functional goals. The manual therapy interventions were performed at a separate and distinct time from the therapeutic activities interventions . (see flow sheet as applicable)     Details if applicable:     39  [facility] Totals Reminder: bill using total billable min of TIMED therapeutic procedures (example: do not include dry needle or estim unattended, both untimed codes, in totals to left)  8-22 min = 1 unit; 23-37 min = 2 units; 38-52 min = 3 units; 53-67 min = 4 units; 68-82 min = 5 units   Total Total     [x]  Patient Education billed concurrently with other procedures   [x] Review HEP    [] Progressed/Changed HEP, detail:    [] Other detail:       Objective Information/Functional Measures/Assessment    Verbal or Tactile cues required: for all therex, demonstration required and tactile for proper form and instruction   Posture/positioning: thoracic kyphosis noted   ROM: improving well   Palpation: thoracic paraspinals moderately hypertonic L>R     Therex and NMR as per flow sheet.      Patient will continue to benefit from skilled PT / OT services to modify and progress therapeutic interventions, analyze and address functional mobility deficits,

## 2023-09-05 ENCOUNTER — HOSPITAL ENCOUNTER (OUTPATIENT)
Facility: HOSPITAL | Age: 73
Setting detail: RECURRING SERIES
Discharge: HOME | End: 2023-09-08
Payer: MEDICAID

## 2023-09-05 PROCEDURE — G0283 ELEC STIM OTHER THAN WOUND: HCPCS

## 2023-09-05 PROCEDURE — 97530 THERAPEUTIC ACTIVITIES: CPT

## 2023-09-05 PROCEDURE — 97112 NEUROMUSCULAR REEDUCATION: CPT

## 2023-09-05 PROCEDURE — 97110 THERAPEUTIC EXERCISES: CPT

## 2023-09-05 NOTE — PROGRESS NOTES
PHYSICAL / OCCUPATIONAL THERAPY - DAILY TREATMENT NOTE (updated )    Patient Name: Mallory Lowry.    Date: 2023    : 1950  Insurance: Payor: Kailey Whitlock / Plan: Torie Ayon / Product Type: *No Product type* /      Patient  verified yes     Visit #   Current / Total 6 15   Time   In / Out 3:06 3:54   Pain   In / Out 8 0   Subjective Functional Status/Changes: Pt reports back is getting worse. She feels more tired as a whole. Has pain along the thoracic spine to below the buttocks on both sides. TREATMENT AREA =  Other low back pain [M54.59]    OBJECTIVE    Modalities Rationale:     decrease inflammation, decrease pain, and increase tissue extensibility to improve patient's ability to progress to PLOF and address remaining functional goals. 10 min [x] Estim Unattended, type/location: IFC at thoracic and lumbar paraspinals                                    []  w/ice    [x]  w/heat    min [] Estim Attended, type/location:                                     []  w/US     []  w/ice    []  w/heat    []  TENS insruct      min []  Mechanical Traction: type/lbs                   []  pro   []  sup   []  int   []  cont    []  before manual    []  after manual    min []  Ultrasound, settings/location:                                                []  take home patch       []  in clinic    min  unbilled []  Ice     []  Heat    location/position:     min []  Paraffin,  details:     min []  Vasopneumatic Device, press/temp:     min []  Ansley Carroll / Nichole Brissa: If using vaso (only need to measure limb vaso being performed on)      pre-treatment girth :       post-treatment girth :       measured at (landmark location) :      min []  Other:    Skin assessment post-treatment (if applicable):    [x]  intact    []  redness- no adverse reaction                 []redness - adverse reaction:        Therapeutic Procedures:   Tx Min Billable or 1:1 Min (if diff from Emiliano)

## 2023-09-08 ENCOUNTER — HOSPITAL ENCOUNTER (OUTPATIENT)
Facility: HOSPITAL | Age: 73
Setting detail: RECURRING SERIES
Discharge: HOME | End: 2023-09-11
Payer: MEDICAID

## 2023-09-08 PROCEDURE — 97112 NEUROMUSCULAR REEDUCATION: CPT

## 2023-09-08 PROCEDURE — 97530 THERAPEUTIC ACTIVITIES: CPT

## 2023-09-08 NOTE — PROGRESS NOTES
progress towards all goals at this time. See PN for full assessment. All goals remain applicable.      PLAN  yes Continue plan of care  [x]  Upgrade activities as tolerated  []  Discharge due to :  []  Other:    Burt Busing, PT    9/8/2023    7:18 AM    Future Appointments   Date Time Provider [address]   9/8/2023  3:00 PM Burt Busing, PT [facility]   9/12/2023  2:20 PM Burt Busing, PT [address] [place] [address]   9/14/2023  1:40 PM Burt Busing, PT [facility]   9/19/2023  2:20 PM Burt Busing, PT [facility]   9/21/2023  4:20 PM Burt Busing, PT [facility]   9/26/2023  2:20 PM Burt Busing, PT [facility]   9/28/2023  4:20 PM Burt Busing, PT [address] [place] [address]

## 2023-09-08 NOTE — THERAPY RECERTIFICATION
Pt will be IND with HEP to facilitate self care management. Status at last Eval: Established Current Status: Daily; split up throughout the day Ongoing   2. Pt will improve lumbar ROM to >50% with less than 6/10 pain      Status at last Eval: EVAL: Thoracolumbar AROM  Flexion: 50%   Extension: NT  L SB: 50%   R SB: 30% p! L side   L Rot: 50% p! R side   R Rot: 50% p! L side  Current Status: Thoracolumbar AROM  Flexion: 50%   Extension: 25%  L SB: 75%  R SB: 75%  L Rot: 100%  R Rot: 100% Progressing   3.  3) Pt will improve worst pain levels from initial evaluation level of 10/10 to 6/10 to show improved QOL and improved overall perception of pain-free/more pain-free function with ADLs. Status at last Eval: 10/10 Current Status: 7/10 Progressing   4) Pt will be able to perform correct log rolling technique for getting in and out of bed  ---> semi-correct            Goal/Measure of Progress - LTGs Goal Met? 1. Pt will be able to walk > 30 min without sig pain without AD   Status at last Eval: limited to 25 min with SPC Current Status: Walking 10 minutes with cane Progressing   2. Pt will improve FOTO scores to 36 in order to show detectable change in overall function. Status at last Eval: 19 Current Status: 51 Yes   3. Pt will be able to sit indefinitely only with minor incr in pain    Status at last Eval: limited to 1 hr before needing to move Current Status: Limited to ~30 minutes Decreased    4) Pt will hip MMT to >/= 4/5 for ease of ADLS ---> HF/Quads 4/5    Goals to be achieved in __4-6__ weeks  Above progressing and ongoing goals remain appropriate    RECOMMENDATIONS  Continue per initial Plan of Care. Suggesting 1-2x/week x 4-6 weeks to address remaining deficits, improve functional mobility, return patient to their PLOF and prevent secondary impairments. Thank you for allowing us to treat your patients. If you have any questions/comments please contact us directly at [phone].

## 2023-09-14 ENCOUNTER — HOSPITAL ENCOUNTER (OUTPATIENT)
Facility: HOSPITAL | Age: 73
Setting detail: RECURRING SERIES
Discharge: HOME | End: 2023-09-17
Payer: MEDICAID

## 2023-09-14 PROCEDURE — 97110 THERAPEUTIC EXERCISES: CPT

## 2023-09-14 PROCEDURE — 97530 THERAPEUTIC ACTIVITIES: CPT

## 2023-09-14 PROCEDURE — 97112 NEUROMUSCULAR REEDUCATION: CPT

## 2023-09-14 NOTE — PROGRESS NOTES
balance, and proprioception to improve patient's ability to progress to PLOF and address remaining functional goals. (see flow sheet as applicable)     Details if applicable:       10  66719 Neuromuscular Re-Education (timed):  improve balance, coordination, kinesthetic sense, posture, core stability and proprioception to improve patient's ability to develop conscious control of individual muscles and awareness of position of extremities in order to progress to PLOF and address remaining functional goals. (see flow sheet as applicable)     Details if applicable:     10  17861 Therapeutic Activity (timed):  use of dynamic activities replicating functional movements to increase ROM, strength, coordination, balance, and proprioception in order to improve patient's ability to progress to PLOF and address remaining functional goals. (see flow sheet as applicable)     Details if applicable:     5  36257 Manual Therapy (timed):  decrease pain, increase ROM, and increase tissue extensibility to improve patient's ability to progress to PLOF and address remaining functional goals. The manual therapy interventions were performed at a separate and distinct time from the therapeutic activities interventions .  (see flow sheet as applicable)     Details if applicable:     36  [facility] Totals Reminder: bill using total billable min of TIMED therapeutic procedures (example: do not include dry needle or estim unattended, both untimed codes, in totals to left)  8-22 min = 1 unit; 23-37 min = 2 units; 38-52 min = 3 units; 53-67 min = 4 units; 68-82 min = 5 units   Total Total     [x]  Patient Education billed concurrently with other procedures   [x] Review HEP    [] Progressed/Changed HEP, detail:    [] Other detail:       Objective Information/Functional Measures/Assessment    Verbal or Tactile cues required: for new therex; demo required for proper form and sequencing   Posture/positioning: good and upright with gait; slow greg

## 2023-09-19 ENCOUNTER — HOSPITAL ENCOUNTER (OUTPATIENT)
Facility: HOSPITAL | Age: 73
Setting detail: RECURRING SERIES
Discharge: HOME | End: 2023-09-22
Payer: MEDICAID

## 2023-09-19 PROCEDURE — 97110 THERAPEUTIC EXERCISES: CPT

## 2023-09-19 PROCEDURE — 97112 NEUROMUSCULAR REEDUCATION: CPT

## 2023-09-19 NOTE — PROGRESS NOTES
PHYSICAL / OCCUPATIONAL THERAPY - DAILY TREATMENT NOTE (updated )    Patient Name: Noemi Osuna.    Date: 2023    : 1950  Insurance: Payor: Aniyah Dalton / Plan: Jacinda Thompson / Product Type: *No Product type* /      Patient  verified yes     Visit #   Current / Total 9 15   Time   In / Out 2:30 pm 3:00 pm   Pain   In / Out 6 0   Subjective Functional Status/Changes: Patient reports feeling the same overall. Nothing new to report. TREATMENT AREA =  Other low back pain [M54.59]    OBJECTIVE    Modalities Rationale:     decrease pain to improve patient's ability to progress to PLOF and address remaining functional goals. Therapeutic Procedures: Tx Min Billable or 1:1 Min (if diff from Tx Min) Procedure, Rationale, Specifics   15  85829 Therapeutic Exercise (timed):  increase ROM, strength, coordination, balance, and proprioception to improve patient's ability to progress to PLOF and address remaining functional goals. (see flow sheet as applicable)     Details if applicable:       15  44015 Neuromuscular Re-Education (timed):  improve balance, coordination, kinesthetic sense, posture, core stability and proprioception to improve patient's ability to develop conscious control of individual muscles and awareness of position of extremities in order to progress to PLOF and address remaining functional goals.  (see flow sheet as applicable)     Details if applicable:     27  [facility] Totals Reminder: bill using total billable min of TIMED therapeutic procedures (example: do not include dry needle or estim unattended, both untimed codes, in totals to left)  8-22 min = 1 unit; 23-37 min = 2 units; 38-52 min = 3 units; 53-67 min = 4 units; 68-82 min = 5 units   Total Total     [x]  Patient Education billed concurrently with other procedures   [x] Review HEP    [] Progressed/Changed HEP, detail:    [] Other detail:       Objective Information/Functional

## 2023-09-21 ENCOUNTER — HOSPITAL ENCOUNTER (OUTPATIENT)
Facility: HOSPITAL | Age: 73
Setting detail: RECURRING SERIES
Discharge: HOME | End: 2023-09-24
Payer: MEDICAID

## 2023-09-21 PROCEDURE — 97112 NEUROMUSCULAR REEDUCATION: CPT

## 2023-09-21 PROCEDURE — 97110 THERAPEUTIC EXERCISES: CPT

## 2023-09-21 PROCEDURE — 97530 THERAPEUTIC ACTIVITIES: CPT

## 2023-09-21 NOTE — PROGRESS NOTES
PHYSICAL / OCCUPATIONAL THERAPY - DAILY TREATMENT NOTE (updated )    Patient Name: Jalyn Coffman.    Date: 2023    : 1950  Insurance: Payor: Natalia Orlando / Plan: Robert Stains / Product Type: *No Product type* /      Patient  verified yes     Visit #   Current / Total 10 15   Time   In / Out 4:20 pm 5:00 pm   Pain   In / Out 5 0   Subjective Functional Status/Changes: Patient reports improved pain levels. TREATMENT AREA =  Other low back pain [M54.59]    OBJECTIVE  Therapeutic Procedures: Tx Min Billable or 1:1 Min (if diff from Tx Min) Procedure, Rationale, Specifics   15  14248 Therapeutic Exercise (timed):  increase ROM, strength, coordination, balance, and proprioception to improve patient's ability to progress to PLOF and address remaining functional goals. (see flow sheet as applicable)     Details if applicable:       15  54237 Neuromuscular Re-Education (timed):  improve balance, coordination, kinesthetic sense, posture, core stability and proprioception to improve patient's ability to develop conscious control of individual muscles and awareness of position of extremities in order to progress to PLOF and address remaining functional goals. (see flow sheet as applicable)     Details if applicable:     10  61974 Therapeutic Activity (timed):  use of dynamic activities replicating functional movements to increase ROM, strength, coordination, balance, and proprioception in order to improve patient's ability to progress to PLOF and address remaining functional goals.   (see flow sheet as applicable)     Details if applicable:     36  [facility] Totals Reminder: bill using total billable min of TIMED therapeutic procedures (example: do not include dry needle or estim unattended, both untimed codes, in totals to left)  8-22 min = 1 unit; 23-37 min = 2 units; 38-52 min = 3 units; 53-67 min = 4 units; 68-82 min = 5 units   Total Total     [x]  Patient

## 2023-09-26 ENCOUNTER — HOSPITAL ENCOUNTER (OUTPATIENT)
Facility: HOSPITAL | Age: 73
Setting detail: RECURRING SERIES
End: 2023-09-26
Payer: MEDICAID

## 2023-09-28 ENCOUNTER — HOSPITAL ENCOUNTER (OUTPATIENT)
Facility: HOSPITAL | Age: 73
Setting detail: RECURRING SERIES
End: 2023-09-28
Payer: MEDICAID

## 2023-09-28 PROCEDURE — 97530 THERAPEUTIC ACTIVITIES: CPT

## 2023-09-28 PROCEDURE — 97110 THERAPEUTIC EXERCISES: CPT

## 2023-09-28 PROCEDURE — 97112 NEUROMUSCULAR REEDUCATION: CPT

## 2023-09-28 NOTE — PROGRESS NOTES
PHYSICAL / OCCUPATIONAL THERAPY - DAILY TREATMENT NOTE (updated )    Patient Name: Cindy Gillis Sr.    Date: 2023    : 1950  Insurance: Payor: Mark Anthony Sheehan / Plan: Cristobal Golden / Product Type: *No Product type* /      Patient  verified yes     Visit #   Current / Total 11 15   Time   In / Out 4:20 pm 5:00 pm   Pain   In / Out 3 0   Subjective Functional Status/Changes: Patient reports feeling better this session. TREATMENT AREA =  Other low back pain [M54.59]    OBJECTIVE      Therapeutic Procedures: Tx Min Billable or 1:1 Min (if diff from Tx Min) Procedure, Rationale, Specifics   15  53360 Therapeutic Exercise (timed):  increase ROM, strength, coordination, balance, and proprioception to improve patient's ability to progress to PLOF and address remaining functional goals. (see flow sheet as applicable)     Details if applicable:       15  18066 Neuromuscular Re-Education (timed):  improve balance, coordination, kinesthetic sense, posture, core stability and proprioception to improve patient's ability to develop conscious control of individual muscles and awareness of position of extremities in order to progress to PLOF and address remaining functional goals. (see flow sheet as applicable)     Details if applicable:     10  02998 Therapeutic Activity (timed):  use of dynamic activities replicating functional movements to increase ROM, strength, coordination, balance, and proprioception in order to improve patient's ability to progress to PLOF and address remaining functional goals.   (see flow sheet as applicable)     Details if applicable:     36  [facility] Totals Reminder: bill using total billable min of TIMED therapeutic procedures (example: do not include dry needle or estim unattended, both untimed codes, in totals to left)  8-22 min = 1 unit; 23-37 min = 2 units; 38-52 min = 3 units; 53-67 min = 4 units; 68-82 min = 5 units   Total Total     [x]

## 2023-11-21 DIAGNOSIS — E11.40 TYPE 2 DIABETES MELLITUS WITH DIABETIC NEUROPATHY, WITHOUT LONG-TERM CURRENT USE OF INSULIN (HCC): ICD-10-CM

## 2023-11-21 DIAGNOSIS — E78.2 MIXED HYPERLIPIDEMIA: ICD-10-CM

## 2023-11-21 RX ORDER — GABAPENTIN 100 MG/1
100 CAPSULE ORAL 3 TIMES DAILY
Qty: 90 CAPSULE | Refills: 2 | Status: SHIPPED | OUTPATIENT
Start: 2023-11-21 | End: 2024-02-19

## 2023-11-21 RX ORDER — ATORVASTATIN CALCIUM 10 MG/1
10 TABLET, FILM COATED ORAL DAILY
Qty: 90 TABLET | Refills: 3 | Status: SHIPPED | OUTPATIENT
Start: 2023-11-21 | End: 2024-11-15

## 2023-11-21 NOTE — TELEPHONE ENCOUNTER
Mike Mccracken Sr. called for their medication refill. Last Office visit:  2023    Last Filled: Rx     Follow up visit:  No future appointments.